# Patient Record
Sex: MALE | Race: WHITE | NOT HISPANIC OR LATINO | Employment: OTHER | URBAN - METROPOLITAN AREA
[De-identification: names, ages, dates, MRNs, and addresses within clinical notes are randomized per-mention and may not be internally consistent; named-entity substitution may affect disease eponyms.]

---

## 2017-04-21 ENCOUNTER — OFFICE VISIT (OUTPATIENT)
Dept: FAMILY MEDICINE | Facility: CLINIC | Age: 82
End: 2017-04-21
Payer: COMMERCIAL

## 2017-04-21 VITALS
SYSTOLIC BLOOD PRESSURE: 128 MMHG | BODY MASS INDEX: 25.2 KG/M2 | HEIGHT: 70 IN | HEART RATE: 80 BPM | DIASTOLIC BLOOD PRESSURE: 70 MMHG | TEMPERATURE: 98.2 F | WEIGHT: 176 LBS

## 2017-04-21 DIAGNOSIS — F32.5 MAJOR DEPRESSION IN COMPLETE REMISSION (H): Primary | ICD-10-CM

## 2017-04-21 DIAGNOSIS — E11.9 TYPE 2 DIABETES MELLITUS WITHOUT COMPLICATION, WITHOUT LONG-TERM CURRENT USE OF INSULIN (H): ICD-10-CM

## 2017-04-21 PROCEDURE — 99213 OFFICE O/P EST LOW 20 MIN: CPT | Performed by: NURSE PRACTITIONER

## 2017-04-21 RX ORDER — LANCETS
EACH MISCELLANEOUS
Qty: 1 BOX | Refills: 3 | Status: SHIPPED | OUTPATIENT
Start: 2017-04-21 | End: 2017-12-22

## 2017-04-21 RX ORDER — PAROXETINE 20 MG/1
20 TABLET, FILM COATED ORAL DAILY
Qty: 90 TABLET | Refills: 3 | Status: SHIPPED | OUTPATIENT
Start: 2017-04-21 | End: 2018-01-04

## 2017-04-21 NOTE — PROGRESS NOTES
..  SUBJECTIVE:                                                    Roque Mckeon is a 81 year old male who presents to clinic today for the following health issues:      Depression Followup    Status since last visit: Stable     See PHQ-9 for current symptoms.  Other associated symptoms: None    Complicating factors:   Significant life event:  No   Current substance abuse:  None  Anxiety or Panic symptoms:  No    PHQ-9  English PHQ-9   Any Language            Amount of exercise or physical activity: still working, plays horseshoes    Problems taking medications regularly: No    Medication side effects: none    Diet: regular (no restrictions), watches sugar      Bertrand is a 81-year-old male seen in clinic today for depression management he has been on Paxil for several years, does very well with that, denies adverse side effects.    He is diabetic, used to be a patient of Dr. Robbins. [facility] has [facility] longer than [facility], but now is [facility]. He has an appointment coming up in May. He will be sending a copy of his lab work. He states they do not refill his lancets and test strips, also will not refill his Paxil. He is here today requesting refills of those prescriptions. He has his eye exams done at the local optometrist office, will be sending us a copy of his next exam. He also recalls having [facility] a few years ago, and also has had his second Pneumovax immunization at the local pharmacy at [facility]. He is going to provide us with that information so his electronic medical record can be updated.    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 He likes sports, is involved in a horseshoe league. He is planning to go to the [employer] game tomorrow.    Problem list and histories reviewed & adjusted, as indicated.  Additional history: as  "documented    BP Readings from Last 3 Encounters:   04/21/17 128/70   09/13/16 136/78   08/12/16 128/70    Wt Readings from Last 3 Encounters:   04/21/17 176 lb (79.8 kg)   09/13/16 181 lb (82.1 kg)   08/12/16 181 lb (82.1 kg)                    Reviewed and updated as needed this visit by clinical staff  Tobacco  Allergies  Meds  Med Hx  Surg Hx  Fam Hx  Soc Hx      Reviewed and updated as needed this visit by Provider         ROS:  Constitutional, HEENT, cardiovascular, pulmonary, gi and gu systems are negative, except as otherwise noted.    OBJECTIVE:                                                    /70  Pulse 80  Temp 98.2  F (36.8  C) (Tympanic)  Ht 5' 10\" (1.778 m)  Wt 176 lb (79.8 kg)  BMI 25.25 kg/m2  Body mass index is 25.25 kg/(m^2).  GENERAL: healthy, alert and no distress  NECK: no adenopathy, no asymmetry, masses, or scars and thyroid normal to palpation  RESP: lungs clear to auscultation - no rales, rhonchi or wheezes  CV: regular rates and rhythm, normal S1 S2, no S3 or S4, no murmur, click or rub, peripheral pulses strong and no peripheral edema  MS: no gross musculoskeletal defects noted, no edema  NEURO: Normal strength and tone, mentation intact and speech normal  PSYCH: mentation appears normal, affect normal/bright    PHQ-9=0     ASSESSMENT/PLAN:                                                      Problem List Items Addressed This Visit        Medium    TYPE 2 DIABETES, HBA1C GOAL < 7%    Relevant Medications    blood glucose monitoring (ONE TOUCH ULTRA) test strip    blood glucose monitoring (ONE TOUCH ULTRASOFT) lancets    Major depression in complete remission (H) - Primary    Relevant Medications    PARoxetine (PAXIL) 20 MG tablet         Continue Paxil 20 mg daily  He will provide the results of his next lab work, and the dates of his updated immunizations and most recent eye exam.  Strongly encouraged to consider staying off the roof, keep his painting to the ground " level.    HETAL Ramos [facility]

## 2017-04-21 NOTE — NURSING NOTE
"Chief Complaint   Patient presents with     Depression     recheck       Initial There were no vitals taken for this visit. Estimated body mass index is 25.24 kg/(m^2) as calculated from the following:    Height as of 3/16/16: 5' 11\" (1.803 m).    Weight as of 9/13/16: 181 lb (82.1 kg).  Medication Reconciliation: complete  "

## 2017-04-21 NOTE — MR AVS SNAPSHOT
"              After Visit Summary   2017    Roque Mckeon    MRN: [id number]           Patient Information     Date Of Birth          1935        Visit Information        Provider Department      2017 8:30 AM Ashlie Garcia APRN CNP [facility]        Today's Diagnoses     Major depression in complete remission (H)    -  1    Type 2 diabetes mellitus without complication, without long-term current use of insulin (H)           Follow-ups after your visit        Who to contact     If you have questions or need follow up information about today's clinic visit or your schedule please contact [facility] directly at [phone].  Normal or non-critical lab and imaging results will be communicated to you by MyChart, letter or phone within 4 business days after the clinic has received the results. If you do not hear from us within 7 days, please contact the clinic through MyChart or phone. If you have a critical or abnormal lab result, we will notify you by phone as soon as possible.  Submit refill requests through [employer] or call your pharmacy and they will forward the refill request to us. Please allow 3 business days for your refill to be completed.          Additional Information About Your Visit        MyChart Information     [employer] lets you send messages to your doctor, view your test results, renew your prescriptions, schedule appointments and more. To sign up, go to [email or web address] . Click on \"Log in\" on the left side of the screen, which will take you to the Welcome page. Then click on \"Sign up Now\" on the right side of the page.     You will be asked to enter the access code listed below, as well as some personal information. Please follow the directions to create your username and password.     Your access code is: [email or web address]  Expires: 2017  9:10 AM     Your access code will  in 90 days. If you need help or a new code, please call your " "[facility] or [phone].        Care EveryWhere ID     This is your Care EveryWhere ID. This could be used by other organizations to access your [facility] medical records  [id number]        Your Vitals Were     Pulse Temperature Height BMI (Body Mass Index)          80 98.2  F (36.8  C) (Tympanic) 5' 10\" (1.778 m) 25.25 kg/m2         Blood Pressure from Last 3 Encounters:   04/21/17 128/70   09/13/16 136/78   08/12/16 128/70    Weight from Last 3 Encounters:   04/21/17 176 lb (79.8 kg)   09/13/16 181 lb (82.1 kg)   08/12/16 181 lb (82.1 kg)              Today, you had the following     No orders found for display         Today's Medication Changes          These changes are accurate as of: 4/21/17 10:56 AM.  If you have any questions, ask your nurse or doctor.               These medicines have changed or have updated prescriptions.        Dose/Directions    blood glucose monitoring lancets   This may have changed:  See the new instructions.   Used for:  Type 2 diabetes mellitus without complication, without long-term current use of insulin (H)   Changed by:  Ashlie Garcia APRN CNP        test 1 times daily   Quantity:  1 Box   Refills:  3       blood glucose monitoring test strip   Commonly known as:  ONE TOUCH ULTRA   This may have changed:  See the new instructions.   Used for:  Type 2 diabetes mellitus without complication, without long-term current use of insulin (H)   Changed by:  Ashlie Garcia APRN CNP        Test once daily or as directed   Quantity:  100 strip   Refills:  3            Where to get your medicines      These medications were sent to [facility] [address] [facility]  [address] [facility] [address]     Phone:  [phone]     blood glucose monitoring lancets    blood glucose monitoring test strip    PARoxetine 20 MG tablet                Primary Care Provider Office Phone # Fax #    HETAL Ramos [phone] " [phone]       [facility]  [address] [place] [address]        Thank you!     Thank you for choosing [facility]  for your care. Our goal is always to provide you with excellent care. Hearing back from our patients is one way we can continue to improve our services. Please take a few minutes to complete the written survey that you may receive in the mail after your visit with us. Thank you!             Your Updated Medication List - Protect others around you: Learn how to safely use, store and throw away your medicines at www.disposemymeds.org.          This list is accurate as of: 4/21/17 10:56 AM.  Always use your most recent med list.                   Brand Name Dispense Instructions for use    ACE NOT PRESCRIBED (INTENTIONAL)      by Other route continuous prn.       allopurinol 300 MG tablet    ZYLOPRIM    90 tablet    Take 1 tablet by mouth daily.       aspirin 81 MG tablet     0    1 tab po QD (Once per day)       blood glucose monitoring lancets     1 Box    test 1 times daily       Blood Glucose Monitoring Suppl Misc     100 each    1 strip daily Or as directed.       blood glucose monitoring test strip    ONE TOUCH ULTRA    100 strip    Test once daily or as directed       fish oil-omega-3 fatty acids 1000 MG capsule      1 capsule 2 times per day       metFORMIN 1000 MG tablet    GLUCOPHAGE    90 tablet    Take 1 tablet (1,000 mg) by mouth daily (with dinner)       MULTIVITAMIN & MINERAL PO      Take 1 tablet by mouth daily       PARoxetine 20 MG tablet    PAXIL    90 tablet    Take 1 tablet (20 mg) by mouth daily       potassium citrate 10 MEQ (1080 MG) CR tablet    potassium citrate    270 tablet    Take 1 tablet by mouth 3 times daily (with meals).       ZOCOR 80 MG tablet   Generic drug:  simvastatin     90 tablet    Take 0.5 tablets (40 mg) by mouth daily

## 2017-04-22 ASSESSMENT — PATIENT HEALTH QUESTIONNAIRE - PHQ9: SUM OF ALL RESPONSES TO PHQ QUESTIONS 1-9: 0

## 2017-05-11 ENCOUNTER — TRANSFERRED RECORDS (OUTPATIENT)
Dept: HEALTH INFORMATION MANAGEMENT | Facility: CLINIC | Age: 82
End: 2017-05-11

## 2017-05-11 ENCOUNTER — HOSPITAL ENCOUNTER (OUTPATIENT)
Dept: CT IMAGING | Facility: CLINIC | Age: 82
Discharge: HOME | End: 2017-05-11
Attending: UROLOGY | Admitting: UROLOGY
Payer: MEDICARE

## 2017-05-11 DIAGNOSIS — N20.0 CALCULUS OF KIDNEY: Primary | ICD-10-CM

## 2017-05-11 DIAGNOSIS — N20.0 CALCULUS OF KIDNEY: ICD-10-CM

## 2017-05-11 PROCEDURE — 74176 CT ABD & PELVIS W/O CONTRAST: CPT

## 2017-05-31 ENCOUNTER — TRANSFERRED RECORDS (OUTPATIENT)
Dept: HEALTH INFORMATION MANAGEMENT | Facility: CLINIC | Age: 82
End: 2017-05-31

## 2017-05-31 LAB
ALT SERPL-CCNC: 38 U/L (ref 0–65)
AST SERPL-CCNC: 32 U/L (ref 5–40)
CHOLEST SERPL-MCNC: 178 MG/DL
CREAT SERPL-MCNC: 1 MG/DL (ref 0.5–1.5)
GFR SERPL CREATININE-BSD FRML MDRD: >60 ML/MIN/1.73M2
GLUCOSE SERPL-MCNC: 174 MG/DL (ref 70–100)
HBA1C MFR BLD: 7.7 % (ref 4.4–6.5)
HDLC SERPL-MCNC: 53 MG/DL (ref 40–67)
POTASSIUM SERPL-SCNC: 4.9 MEQ/L (ref 3.5–5)
TRIGL SERPL-MCNC: 127 MG/DL

## 2017-06-26 NOTE — PROGRESS NOTES
"  SUBJECTIVE:                                                    Roque Mckeon is a 81 year old male who presents to clinic today for the following health issues:    Rash/ spots  Onset: noticed it yesterday, patient works outside so possible tick bite.    Description:   Location: Left shoulder, lower abdominal, and groin area, about 7 different spots .  Character: round, raised, red  Itching (Pruritis): YES- slightly    Progression of Symptoms:  same    Accompanying Signs & Symptoms:  Fever: no   Body aches or joint pain: no   Sore throat symptoms: no   Recent cold symptoms: YES- just fatigue but patient states its from lack of sleep.    History:   Previous similar rash: no     Precipitating factors:   Exposure to similar rash: no   New exposures: None   Recent travel: no     Alleviating factors:  no    Therapies Tried and outcome: nothing    Patient is here in clinic with his wife with concern about multiple bites along the lower abdomen and one on the left arm. He is unsure what bit him but does spend time outside in the grass and is concerned about lymes as some of the lesions have a bullseye look to them, he has had some fatigue but denies any muscle aches, fevers other skin changes or concerns.     -------------------------------------    Problem list and histories reviewed & adjusted, as indicated.  Additional history: as documented    BP Readings from Last 3 Encounters:   06/27/17 130/64   04/21/17 128/70   09/13/16 136/78    Wt Readings from Last 3 Encounters:   06/27/17 169 lb 6.4 oz (76.8 kg)   04/21/17 176 lb (79.8 kg)   09/13/16 181 lb (82.1 kg)         Reviewed and updated as needed this visit by clinical staff       Reviewed and updated as needed this visit by Provider         ROS:  Constitutional, HEENT, cardiovascular, pulmonary, gi and gu systems are negative, except as otherwise noted.    OBJECTIVE:     /64  Pulse 84  Temp 97.8  F (36.6  C) (Temporal)  Resp 16  Ht 5' 11\" (1.803 m)  Wt " 169 lb 6.4 oz (76.8 kg)  BMI 23.63 kg/m2  Body mass index is 23.63 kg/(m^2).  GENERAL: healthy, alert and no distress  HENT: ear canals and TM's normal, nose and mouth without ulcers or lesions  NECK: no adenopathy, no asymmetry, masses, or scars and thyroid normal to palpation  RESP: lungs clear to auscultation - no rales, rhonchi or wheezes  CV: regular rates and rhythm, peripheral pulses strong and no peripheral edema  MS: no gross musculoskeletal defects noted, no edema  SKIN: there are a few scattered bites along the belt line and one on the left arm that are raised without any drainage and with pink to red rash surrounding each bite. No other skin changes noted     Diagnostic Test Results:  Labs pending     ASSESSMENT/PLAN:       ICD-10-CM    1. 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 Fatigue, unspecified type R53.83 Lyme Disease Rosalba with reflex to WB Serum   3. Rash and nonspecific skin eruption R21 triamcinolone (KENALOG) 0.1 % cream       I will treat rash with a topical cream and get labs to evaluate for tick borne illness, I do think these lesions are from a different insect bite and should resolve in the next few days, if having any signs of infection or spreading rash follow up in clinic for further evaluation. We will follow up with lab results.   See Patient Instructions    Michelle Ospina PA-C  [facility]

## 2017-06-27 ENCOUNTER — OFFICE VISIT (OUTPATIENT)
Dept: FAMILY MEDICINE | Facility: OTHER | Age: 82
End: 2017-06-27
Payer: COMMERCIAL

## 2017-06-27 VITALS
WEIGHT: 169.4 LBS | RESPIRATION RATE: 16 BRPM | SYSTOLIC BLOOD PRESSURE: 130 MMHG | HEIGHT: 71 IN | HEART RATE: 84 BPM | DIASTOLIC BLOOD PRESSURE: 64 MMHG | TEMPERATURE: 97.8 F | BODY MASS INDEX: 23.72 KG/M2
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
## 2017-06-27 DIAGNOSIS — A77.49 POSITIVE ANAPLASMA SEROLOGY: ICD-10-CM

## 2017-06-27 DIAGNOSIS — R53.83 FATIGUE, UNSPECIFIED TYPE: ICD-10-CM

## 2017-06-27 DIAGNOSIS — R21 RASH AND NONSPECIFIC SKIN ERUPTION: ICD-10-CM

## 2017-06-27 PROCEDURE — 86666 EHRLICHIA ANTIBODY: CPT | Mod: 90 | Performed by: PHYSICIAN ASSISTANT

## 2017-06-27 PROCEDURE — 87798 DETECT AGENT NOS DNA AMP: CPT | Mod: 90 | Performed by: PHYSICIAN ASSISTANT

## 2017-06-27 PROCEDURE — 99213 OFFICE O/P EST LOW 20 MIN: CPT | Performed by: PHYSICIAN ASSISTANT

## 2017-06-27 PROCEDURE — 99000 SPECIMEN HANDLING OFFICE-LAB: CPT | Performed by: PHYSICIAN ASSISTANT

## 2017-06-27 PROCEDURE — 36415 COLL VENOUS BLD VENIPUNCTURE: CPT | Performed by: PHYSICIAN ASSISTANT

## 2017-06-27 PROCEDURE — 86618 LYME DISEASE ANTIBODY: CPT | Performed by: PHYSICIAN ASSISTANT

## 2017-06-27 RX ORDER — TRIAMCINOLONE ACETONIDE 1 MG/G
CREAM TOPICAL
Qty: 30 G | Refills: 0 | Status: SHIPPED | OUTPATIENT
Start: 2017-06-27 | End: 2021-06-25

## 2017-06-27 ASSESSMENT — PATIENT HEALTH QUESTIONNAIRE - PHQ9: 5. POOR APPETITE OR OVEREATING: SEVERAL DAYS

## 2017-06-27 ASSESSMENT — ANXIETY QUESTIONNAIRES
6. BECOMING EASILY ANNOYED OR IRRITABLE: NOT AT ALL
3. WORRYING TOO MUCH ABOUT DIFFERENT THINGS: SEVERAL DAYS
GAD7 TOTAL SCORE: 4
IF YOU CHECKED OFF ANY PROBLEMS ON THIS QUESTIONNAIRE, HOW DIFFICULT HAVE THESE PROBLEMS MADE IT FOR YOU TO DO YOUR WORK, TAKE CARE OF THINGS AT HOME, OR GET ALONG WITH OTHER PEOPLE: SOMEWHAT DIFFICULT
7. FEELING AFRAID AS IF SOMETHING AWFUL MIGHT HAPPEN: NOT AT ALL
2. NOT BEING ABLE TO STOP OR CONTROL WORRYING: SEVERAL DAYS
5. BEING SO RESTLESS THAT IT IS HARD TO SIT STILL: NOT AT ALL
1. FEELING NERVOUS, ANXIOUS, OR ON EDGE: SEVERAL DAYS

## 2017-06-27 ASSESSMENT — PAIN SCALES - GENERAL: PAINLEVEL: NO PAIN (0)

## 2017-06-27 NOTE — LETTER
My Depression Action Plan  Name: Roque Mckeon   Date of Birth 1935  Date: 6/26/2017    My doctor: Ashlie Garcia   My clinic: [address] [address] [phone]          GREEN    ZONE   Good Control    What it looks like:     Things are going generally well. You have normal up s and down s. You may even feel depressed from time to time, but bad moods usually last less than a day.   What you need to do:  1. Continue to care for yourself (see self care plan)  2. Check your depression survival kit and update it as needed  3. Follow your physician s recommendations including any medication.  4. Do not stop taking medication unless you consult with your physician first.           YELLOW         ZONE Getting Worse    What it looks like:     Depression is starting to interfere with your life.     It may be hard to get out of bed; you may be starting to isolate yourself from others.    Symptoms of depression are starting to last most all day and this has happened for several days.     You may have suicidal thoughts but they are not constant.   What you need to do:     1. Call your care team, your response to treatment will improve if you keep your care team informed of your progress. Yellow periods are signs an adjustment may need to be made.     2. Continue your self-care, even if you have to fake it!    3. Talk to someone in your support network    4. Open up your depression survival kit           RED    ZONE Medical Alert - Get Help    What it looks like:     Depression is seriously interfering with your life.     You may experience these or other symptoms: You can t get out of bed most days, can t work or engage in other necessary activities, you have trouble taking care of basic hygiene, or basic responsibilities, thoughts of suicide or death that will not go away, self-injurious behavior.     What you need to do:  1. Call your care team  and request a same-day appointment. If they are not available (weekends or after hours) call your local crisis line, emergency room or 911.      Electronically signed by: Kathie Nix, June 26, 2017    Depression Self Care Plan / Survival Kit    Self-Care for Depression  Here s the deal. Your body and mind are really not as separate as most people think.  What you do and think affects how you feel and how you feel influences what you do and think. This means if you do things that people who feel good do, it will help you feel better.  Sometimes this is all it takes.  There is also a place for medication and therapy depending on how severe your depression is, so be sure to consult with your medical provider and/ or Behavioral Health Consultant if your symptoms are worsening or not improving.     In order to better manage my stress, I will:    Exercise  Get some form of exercise, every day. This will help reduce pain and release endorphins, the  feel good  chemicals in your brain. This is almost as good as taking antidepressants!  This is not the same as joining a gym and then never going! (they count on that by the way ) It can be as simple as just going for a walk or doing some gardening, anything that will get you moving.      Hygiene   Maintain good hygiene (Get out of bed in the morning, Make your bed, Brush your teeth, Take a shower, and Get dressed like you were going to work, even if you are unemployed).  If your clothes don't fit try to get ones that do.    Diet  I will strive to eat foods that are good for me, drink plenty of water, and avoid excessive sugar, caffeine, alcohol, and other mood-altering substances.  Some foods that are helpful in depression are: complex carbohydrates, B vitamins, flaxseed, fish or fish oil, fresh fruits and vegetables.    Psychotherapy  I agree to participate in Individual Therapy (if recommended).    Medication  If prescribed medications, I agree to take them.  Missing  doses can result in serious side effects.  I understand that drinking alcohol, or other illicit drug use, may cause potential side effects.  I will not stop my medication abruptly without first discussing it with my provider.    Staying Connected With Others  I will stay in touch with my friends, family members, and my primary care provider/team.    Use your imagination  Be creative.  We all have a creative side; it doesn t matter if it s oil painting, sand castles, or mud pies! This will also kick up the endorphins.    Witness Beauty  (AKA stop and smell the roses) Take a look outside, even in mid-winter. Notice colors, textures. Watch the squirrels and birds.     Service to others  Be of service to others.  There is always someone else in need.  By helping others we can  get out of ourselves  and remember the really important things.  This also provides opportunities for practicing all the other parts of the program.    Humor  Laugh and be silly!  Adjust your TV habits for less news and crime-drama and more comedy.    Control your stress  Try breathing deep, massage therapy, biofeedback, and meditation. Find time to relax each day.     My support system    Clinic Contact:  Phone number:    Contact 1:  Phone number:    Contact 2:  Phone number:    Mandaen/:  Phone number:    Therapist:  Phone number:    Local crisis center:    Phone number:    Other community support:  Phone number:

## 2017-06-27 NOTE — PATIENT INSTRUCTIONS
Insect Bite  Insects most often bite to protect themselves or their nests. Certain bugs, like fleas and mosquitoes, bite to feed. In some cases, the actual bite causes no pain. An itchy red welt or swelling may develop at the site of the bite. Most insect bites do not cause illness. And the itching and swelling most often go away without treatment. However, an infection can develop if the bite is scratched and the skin broken. Rarely, a person may have an allergic reaction to an insect bite.  If a stinger is visible at the bite spot, remove it as quickly as possible, as this can decrease the amount of venom that gets into your body. Scrape it out with a dull edge, such as the edge of a credit card. Try not to squeeze it. Do not try to dig it out, as you may damage the skin and also increase the chance of infection.     To help reduce swelling and itching, apply a cold pack or ice in a zip-top plastic bag wrapped in a thin towel.   Home care    Your healthcare provider may prescribe over-the-counter medicines to help relieve itching and swelling. Use each medicine according to the directions on the package. If the bite becomes infected, you will need an antibiotic. This may be in pill form taken by mouth or as an ointment or cream put directly on the skin. Be sure to use them exactly as prescribed.    Bite symptoms usually go away on their own within a week or two.    To help prevent infection, avoid scratching or picking at the bite.    To help relieve itching and swelling, apply ice in a zip-top plastic bag wrapped in a thin towel to the bites. Do this for up to 10 minutes at a time. Avoid hot showers or baths as these tend to make itching worse.    An over-the-counter anti-itch medicine such as calamine lotion or an antihistamine cream may be helpful.    If you suspect you have insects in your home, talk to a licensed pest-control professional. He or she can inspect your home and tell you how to get rid of bugs  safely.  Follow-up care  Follow up with your healthcare provider, or as advised.  Call 911  Call 911 if any of these occur:    Trouble breathing or swallowing    Wheezing    Feeling like your throat is closing up    Fainting, loss of consciousness    Swelling around the face or mouth  When to seek medical advice  Call your healthcare provider right away if any of these occur:    Fever of 100.4 F (38 C) or higher, or as directed by your healthcare provider    Signs of infection, such as increased swelling and pain, warmth, red streaks, or drainage from the skin    Signs of allergic reaction, such as hives, a spreading rash, or throat itching  Date Last Reviewed: 10/1/2016    5098-7520 [employer]. [address] [place] [address], [place] [address]. All rights reserved. This information is not intended as a substitute for professional medical care. Always follow your healthcare professional's instructions.

## 2017-06-27 NOTE — MR AVS SNAPSHOT
After Visit Summary   6/27/2017    Roque Mckeon    MRN: [id number]           Patient Information     Date Of Birth          1935        Visit Information        Provider Department      6/27/2017 9:40 AM Michelle Ospina PA-C [facility]'s Diagnoses     Bug bites, initial encounter    -  1    Fatigue, unspecified type        Rash and nonspecific skin eruption          Care Instructions      Insect Bite  Insects most often bite to protect themselves or their nests. Certain bugs, like fleas and mosquitoes, bite to feed. In some cases, the actual bite causes no pain. An itchy red welt or swelling may develop at the site of the bite. Most insect bites do not cause illness. And the itching and swelling most often go away without treatment. However, an infection can develop if the bite is scratched and the skin broken. Rarely, a person may have an allergic reaction to an insect bite.  If a stinger is visible at the bite spot, remove it as quickly as possible, as this can decrease the amount of venom that gets into your body. Scrape it out with a dull edge, such as the edge of a credit card. Try not to squeeze it. Do not try to dig it out, as you may damage the skin and also increase the chance of infection.     To help reduce swelling and itching, apply a cold pack or ice in a zip-top plastic bag wrapped in a thin towel.   Home care    Your healthcare provider may prescribe over-the-counter medicines to help relieve itching and swelling. Use each medicine according to the directions on the package. If the bite becomes infected, you will need an antibiotic. This may be in pill form taken by mouth or as an ointment or cream put directly on the skin. Be sure to use them exactly as prescribed.    Bite symptoms usually go away on their own within a week or two.    To help prevent infection, avoid scratching or picking at the bite.    To help relieve itching and swelling,  apply ice in a zip-top plastic bag wrapped in a thin towel to the bites. Do this for up to 10 minutes at a time. Avoid hot showers or baths as these tend to make itching worse.    An over-the-counter anti-itch medicine such as calamine lotion or an antihistamine cream may be helpful.    If you suspect you have insects in your home, talk to a licensed pest-control professional. He or she can inspect your home and tell you how to get rid of bugs safely.  Follow-up care  Follow up with your healthcare provider, or as advised.  Call 911  Call 911 if any of these occur:    Trouble breathing or swallowing    Wheezing    Feeling like your throat is closing up    Fainting, loss of consciousness    Swelling around the face or mouth  When to seek medical advice  Call your healthcare provider right away if any of these occur:    Fever of 100.4 F (38 C) or higher, or as directed by your healthcare provider    Signs of infection, such as increased swelling and pain, warmth, red streaks, or drainage from the skin    Signs of allergic reaction, such as hives, a spreading rash, or throat itching  Date Last Reviewed: 10/1/2016    9934-7546 [employer]. [address] [place] [address]. All rights reserved. This information is not intended as a substitute for professional medical care. Always follow your healthcare professional's instructions.                Follow-ups after your visit        Follow-up notes from your care team     Return if symptoms worsen or fail to improve.      Who to contact     If you have questions or need follow up information about today's clinic visit or your schedule please contact [facility] directly at [phone].  Normal or non-critical lab and imaging results will be communicated to you by [employer], letter or phone within 4 business days after the clinic has received the results. If you do not hear from us within 7 days, please contact the clinic through [employer]  "or phone. If you have a critical or abnormal lab result, we will notify you by phone as soon as possible.  Submit refill requests through [employer] or call your pharmacy and they will forward the refill request to us. Please allow 3 business days for your refill to be completed.          Additional Information About Your Visit        [employer] Information     [employer] lets you send messages to your doctor, view your test results, renew your prescriptions, schedule appointments and more. To sign up, go to [email or web address] . Click on \"Log in\" on the left side of the screen, which will take you to the Welcome page. Then click on \"Sign up Now\" on the right side of the page.     You will be asked to enter the access code listed below, as well as some personal information. Please follow the directions to create your username and password.     Your access code is: [email or web address]  Expires: 2017  9:10 AM     Your access code will  in 90 days. If you need help or a new code, please call your [facility] clinic or [phone].        Care EveryWhere ID     This is your Care EveryWhere ID. This could be used by other organizations to access your [facility] medical records  [id number]        Your Vitals Were     Pulse Temperature Respirations Height BMI (Body Mass Index)       84 97.8  F (36.6  C) (Temporal) 16 5' 11\" (1.803 m) 23.63 kg/m2        Blood Pressure from Last 3 Encounters:   17 130/64   17 128/70   16 136/78    Weight from Last 3 Encounters:   17 169 lb 6.4 oz (76.8 kg)   17 176 lb (79.8 kg)   16 181 lb (82.1 kg)              We Performed the Following     Anaplasma phagocytoph antibody IgG IgM     DEPRESSION ACTION PLAN (DAP)     Ehrlichia Anaplasma Sp by PCR     Lyme Disease Rosalba with reflex to WB Serum          Today's Medication Changes          These changes are accurate as of: 17 10:04 AM.  If you have any questions, ask your nurse or doctor.               Start " taking these medicines.        Dose/Directions    triamcinolone 0.1 % cream   Commonly known as:  KENALOG   Used for:  Bug bites, initial encounter, Rash and nonspecific skin eruption   Started by:  Michelle Ospina PA-C        Apply sparingly to affected area three times daily for 14 days.   Quantity:  30 g   Refills:  0            Where to get your medicines      These medications were sent to [id number] - [place], [place] - [address]  [address], [facility] [address]    Hours:  [facility]ly [place] - numbers unchanged   9/8/03  Phone:  [phone]     triamcinolone 0.1 % cream                Primary Care Provider Office Phone # Fax #    [facility] [phone]       [facility]  [address] [place] [address]        Equal Access to Services     RADHA GRISSOM : Hadii christelle ku hadasho Soomaali, waaxda luqadaha, qaybta kaalmada adeegyada, serena lauren hayelías lópez . So [facility] [phone].    ATENCIÓN: Si habla español, tiene a appiah disposición servicios gratuitos de asistencia lingüística. [facility] [phone].    We comply with applicable federal civil rights laws and [place] laws. We do not discriminate on the basis of race, color, national origin, age, disability sex, sexual orientation or gender identity.            Thank you!     Thank you for choosing [facility]  for your care. Our goal is always to provide you with excellent care. Hearing back from our patients is one way we can continue to improve our services. Please take a few minutes to complete the written survey that you may receive in the mail after your visit with us. Thank you!             Your Updated Medication List - Protect others around you: Learn how to safely use, store and throw away your medicines at www.disposemymeds.org.          This list is accurate as of: 6/27/17 10:04 AM.  Always use your most recent med list.                   Brand Name Dispense  Instructions for use Diagnosis    ACE NOT PRESCRIBED (INTENTIONAL)      by Other route continuous prn.        allopurinol 300 MG tablet    ZYLOPRIM    90 tablet    Take 1 tablet by mouth daily.    Calculus of kidney       aspirin 81 MG tablet     0    1 tab po QD (Once per day)    Type II or unspecified type diabetes mellitus without mention of complication, not stated as uncontrolled       blood glucose monitoring lancets     1 Box    test 1 times daily    Type 2 diabetes mellitus without complication, without long-term current use of insulin (H)       Blood Glucose Monitoring Suppl Misc     100 each    1 strip daily Or as directed.    Type 2 diabetes, HbA1c goal < 7% (H)       blood glucose monitoring test strip    ONE TOUCH ULTRA    100 strip    Test once daily or as directed    Type 2 diabetes mellitus without complication, without long-term current use of insulin (H)       fish oil-omega-3 fatty acids 1000 MG capsule      1 capsule 2 times per day    Type II or unspecified type diabetes mellitus without mention of complication, not stated as uncontrolled       metFORMIN 1000 MG tablet    GLUCOPHAGE    90 tablet    Take 1 tablet (1,000 mg) by mouth daily (with dinner)        MULTIVITAMIN & MINERAL PO      Take 1 tablet by mouth daily        PARoxetine 20 MG tablet    PAXIL    90 tablet    Take 1 tablet (20 mg) by mouth daily        potassium citrate 10 MEQ (1080 MG) CR tablet    potassium citrate    270 tablet    Take 1 tablet by mouth 3 times daily (with meals).    Calculus of kidney       triamcinolone 0.1 % cream    KENALOG    30 g    Apply sparingly to affected area three times daily for 14 days.    Bug bites, initial encounter, Rash and nonspecific skin eruption       ZOCOR 80 MG tablet   Generic drug:  simvastatin     90 tablet    Take 0.5 tablets (40 mg) by mouth daily    Hyperlipidemia LDL goal <100

## 2017-06-27 NOTE — NURSING NOTE
"Chief Complaint   Patient presents with     Lyme disease check     Panel Management     Tdap, prevnar 13, creatinine, fall risk, honoring choices, lipid, cmp, a1c, micro, eye exam, foot exam, tsh, dap, phq9       Initial /64  Pulse 84  Temp 97.8  F (36.6  C) (Temporal)  Resp 16  Ht 5' 11\" (1.803 m)  Wt 169 lb 6.4 oz (76.8 kg)  BMI 23.63 kg/m2 Estimated body mass index is 23.63 kg/(m^2) as calculated from the following:    Height as of this encounter: 5' 11\" (1.803 m).    Weight as of this encounter: 169 lb 6.4 oz (76.8 kg).  Medication Reconciliation: complete    "

## 2017-06-28 LAB — B BURGDOR IGG+IGM SER QL: 0.11 (ref 0–0.89)

## 2017-06-28 ASSESSMENT — PATIENT HEALTH QUESTIONNAIRE - PHQ9: SUM OF ALL RESPONSES TO PHQ QUESTIONS 1-9: 4

## 2017-06-28 ASSESSMENT — ANXIETY QUESTIONNAIRES: GAD7 TOTAL SCORE: 4

## 2017-06-29 LAB
A PHAGOCYTOPH DNA BLD QL NAA+PROBE: NOT DETECTED
E CHAFFEENSIS DNA BLD QL NAA+PROBE: NOT DETECTED
E EWINGII DNA SPEC QL NAA+PROBE: NOT DETECTED
EHRLICHIA DNA SPEC QL NAA+PROBE: NORMAL

## 2017-06-30 LAB
A PHAGOCYTOPH IGG TITR SER IF: ABNORMAL {TITER}
A PHAGOCYTOPH IGM TITR SER IF: ABNORMAL {TITER}

## 2017-07-03 ENCOUNTER — TELEPHONE (OUTPATIENT)
Dept: FAMILY MEDICINE | Facility: CLINIC | Age: 82
End: 2017-07-03

## 2017-07-03 NOTE — TELEPHONE ENCOUNTER
Second attempt to contact patient, but NA. Unable to leave message. Will try later and if unable to reach will forward to the provider that saw him 6/27/17 as test results are back and looks like current or recent Anaplasmosis.    Component      Latest Ref Rng & Units 6/27/2017   A Phagocytophil IgG       1:640 (A) . . .   A Phagocytophil IgM       < 1:16 . . .     A Phagocytophil IgG (Abnormal) 06/27/2017 10:05 AM [facility] lab   1:640   Reference range: <1:80   (Note)   INTERPRETIVE INFORMATION: A. phagocytophilum (HGA)   Antibody, IgG    Less than 1:80 - No significant level of IgG antibodies                     to A. phagocytophilum detected.    Greater than or    equal to 1:80  - Suggestive of a recent or past infection                     with A. phagocytophilum.   Test developed and characteristics determined by [facility] [employer]. See Compliance Statement B: [email or web address]/CS      JUAN MANUEL Shelton

## 2017-07-03 NOTE — TELEPHONE ENCOUNTER
"Reason for call:  Patient reporting a symptom    Symptom or request: Wife, Makeda states the medication (PARoxetine (PAXIL) 20 MG tablet) doesn't seem to be helping patient very well as \"he's having episode\", please advise    Duration (how long have symptoms been present):     Have you been treated for this before? Yes    Additional comments: Patients wife was advised that Ashlie is out today    Phone Number patient can be reached at:  Other phone number:  [phone]    Best Time:      Can we leave a detailed message on this number:  YES    Call taken on 7/3/2017 at 8:34 AM by Anabela Gonzalez    "

## 2017-07-03 NOTE — TELEPHONE ENCOUNTER
RN attempted to contact pt, but NA. Will try again later. Unable to leave message....................................JUAN MANUEL Shelton

## 2017-07-03 NOTE — TELEPHONE ENCOUNTER
Patient called back. Please call back when able to.  # [phone]    Thank you,   Margie Guillen   for [facility]

## 2017-07-03 NOTE — TELEPHONE ENCOUNTER
Third attempt to call patient back. NA. Will try cell # next time. ..............JUAN MANUEL Shelton

## 2017-07-05 RX ORDER — DOXYCYCLINE 100 MG/1
100 CAPSULE ORAL 2 TIMES DAILY
Qty: 20 CAPSULE | Refills: 0 | Status: SHIPPED | OUTPATIENT
Start: 2017-07-05 | End: 2017-07-20

## 2017-07-05 NOTE — PROGRESS NOTES
SUBJECTIVE:                                                    Roque Mckeon is a 81 year old male who presents to clinic today for the following health issues:    Depression Followup    Status since last visit: Worsened for the past 3 weeks    See PHQ-9 for current symptoms.  Other associated symptoms: sleeplessness, so tired    Complicating factors:   Significant life event:  Yes-  Favorite cousin passed away, brother in law in hospital   Current substance abuse:  None  Anxiety or Panic symptoms:  Yes-    Patient was recently diagnosed with Anaplasmosis and started antibiotics yesterday.  Rash on abdomen much better.    PHQ-9  English  PHQ-9   Any Language    Amount of exercise or physical activity: Walks 1-2 days a week    Problems taking medications regularly: No    Medication side effects: none    Diet: wife helps him stay away from starches like bread and potatoes      PROBLEMS TO ADD ON...    Problem list and histories reviewed & adjusted, as indicated.  Additional history: as documented    Patient Active Problem List   Diagnosis     Other left bundle branch block     TYPE 2 DIABETES, HBA1C GOAL < 7%     HYPERLIPIDEMIA LDL GOAL <100     Major depression in complete remission (H)     Advanced directives, counseling/discussion     Encounter for long-term current use of medication     Gout     Past Surgical History:   Procedure Laterality Date     COLONOSCOPY  8/3/2011    Procedure:COMBINED COLONOSCOPY, SINGLE BIOPSY/POLYPECTOMY BY BIOPSY; Surgeon:GRUPO STONER; Location: GI     COLONOSCOPY  2016    [facility] COLONOSCOPY W/WO BRUSH/WASH  02/27/2001    Normal.     HC FRAGMENTING OF KIDNEY STONE  03/05/2003    Left extracorporal shock wave lithotripsy, [facility]     PHACOEMULSIFICATION WITH STANDARD INTRAOCULAR LENS IMPLANT  1/20/2011    PHACOEMULSIFICATION WITH STANDARD INTRAOCULAR LENS IMPLANT performed by OSMAN CHO at  OR       Social History   Substance Use Topics     Smoking  status: Never Smoker     Smokeless tobacco: Never Used      Comment: never     Alcohol use No     Family History   Problem Relation Age of Onset     Cardiovascular Mother      Arthritis Mother      HEART DISEASE Mother      CEREBROVASCULAR DISEASE Mother      Cardiovascular Father      HEART DISEASE Father      Depression Son      Depression Sister      Genetic Disorder Son      OSTEOPOROSIS Sister      Psychotic Disorder Son      Breast Cancer Sister      Alcohol/Drug No family hx of      Circulatory No family hx of      Allergies No family hx of      Alzheimer Disease No family hx of      Blood Disease No family hx of      CANCER No family hx of      DIABETES No family hx of      GASTROINTESTINAL DISEASE No family hx of      Genitourinary Problems No family hx of      Lipids No family hx of      Neurologic Disorder No family hx of      Thyroid Disease No family hx of          Current Outpatient Prescriptions   Medication Sig Dispense Refill     doxycycline (VIBRAMYCIN) 100 MG capsule Take 1 capsule (100 mg) by mouth 2 times daily 20 capsule 0     triamcinolone (KENALOG) 0.1 % cream Apply sparingly to affected area three times daily for 14 days. 30 g 0     PARoxetine (PAXIL) 20 MG tablet Take 1 tablet (20 mg) by mouth daily 90 tablet 3     blood glucose monitoring (ONE TOUCH ULTRA) test strip Test once daily or as directed 100 strip 3     blood glucose monitoring (ONE TOUCH ULTRASOFT) lancets test 1 times daily 1 Box 3     metFORMIN (GLUCOPHAGE) 1000 MG tablet Take 1 tablet (1,000 mg) by mouth daily (with dinner) 90 tablet 3     simvastatin (ZOCOR) 80 MG tablet Take 0.5 tablets (40 mg) by mouth daily 90 tablet 3     Blood Glucose Monitoring Suppl MISC 1 strip daily Or as directed. 100 each 0     Multiple Vitamins-Minerals (MULTIVITAMIN & MINERAL PO) Take 1 tablet by mouth daily       potassium citrate (UROCIT-K 10) 10 MEQ (1080 MG) SR tablet Take 1 tablet by mouth 3 times daily (with meals). 270 tablet 3      "allopurinol (ZYLOPRIM) 300 MG tablet Take 1 tablet by mouth daily. 90 tablet 3     ACE NOT PRESCRIBED, INTENTIONAL, by Other route continuous prn.  0     FISH OIL 1000 MG OR CAPS 1 capsule 2 times per day  0     ASPIRIN 81 MG OR TABS 1 tab po QD (Once per day) 0 0     Allergies   Allergen Reactions     No Known Drug Allergies        Reviewed and updated as needed this visit by clinical staff  Tobacco  Allergies  Med Hx  Surg Hx  Fam Hx  Soc Hx      Reviewed and updated as needed this visit by Provider         ROS:  Constitutional, HEENT, cardiovascular, pulmonary, gi and gu systems are negative, except as otherwise noted.    OBJECTIVE:     /70 (BP Location: Right arm, Patient Position: Chair, Cuff Size: Adult Regular)  Pulse 84  Temp 97.8  F (36.6  C) (Temporal)  Resp 28  Ht 5' 11\" (1.803 m)  Wt 170 lb 12.8 oz (77.5 kg)  BMI 23.82 kg/m2  Body mass index is 23.82 kg/(m^2).  GENERAL: healthy, alert and no distress  NECK: no adenopathy, no asymmetry, masses, or scars and thyroid normal to palpation  RESP: lungs clear to auscultation - no rales, rhonchi or wheezes  CV: regular rate and rhythm, normal S1 S2, no S3 or S4, no murmur, click or rub, no peripheral edema and peripheral pulses strong  ABDOMEN: soft, nontender, no hepatosplenomegaly, no masses and bowel sounds normal  MS: no gross musculoskeletal defects noted, no edema  NEURO: Normal strength and tone, mentation intact and speech normal  BACK: no CVA tenderness, no paralumbar tenderness  PSYCH: mentation appears normal, affect normal/bright    Diagnostic Test Results:  Results for orders placed or performed in visit on 06/27/17   Lyme Disease Rosalba with reflex to WB Serum   Result Value Ref Range    Lyme Disease Antibodies Serum 0.11 0.00 - 0.89   Ehrlichia Anaplasma Sp by PCR   Result Value Ref Range    Anaplasma phagocytophilum Not Detected     Ehrlichia chaffeensis Not Detected     Ehrlichia ewingii/canis Not Detected     Ehrlichia muris-like  "      Not Detected  (Note)  INTERPRETIVE INFORMATION:  Ehrlichia and Anaplasma Species  by PCR  A negative result does not rule out the presence of PCR  inhibitors in the patient specimen or test-specific nucleic  acid in concentrations below the level of detection by this  assay.  Test developed and characteristics determined by [facility]. See Compliance Statement B: [facility]  Performed by [facility],  [address] [place] [place] [address] [phone]  [email or web address], Chencho Bojorquez MD, Lab. Director     Anaplasma phagocytoph antibody IgG IgM   Result Value Ref Range    A Phagocytophil IgG (A)      1:640  Reference range: <1:80  (Note)  INTERPRETIVE INFORMATION: A. phagocytophilum (HGA)  Antibody, IgG   Less than 1:80 - No significant level of IgG antibodies                    to A. phagocytophilum detected.   Greater than or   equal to 1:80  - Suggestive of a recent or past infection                    with A. phagocytophilum.  Test developed and characteristics determined by [facility]. See Compliance Statement B: [facility]: [place]      A Phagocytophil IgM       < 1:16  Reference range: < 1:16  (Note)  INTERPRETIVE INFORMATION: A. phagocytophilum (HGA)  Antibody, IgM   Less than 1:16 - No significant level of IgM antibodies                    to A. phagocytophilum detected.   Greater than or   equal to 1:16  - Suggestive of a current or recent                    infection with A. phagocytophilum.  Test developed and characteristics determined by [facility]. See Compliance Statement B: [employer]: [place]  Performed by [facility],  [address] [place] [address] [phone]  www.[email or web address], Chencho Bojorquez MD, Lab. Director         ASSESSMENT/PLAN:       ICD-10-CM    1. Major depression in complete remission (H) F32.5    2. Screening for diabetic retinopathy Z13.5    3. Screening for diabetic peripheral neuropathy Z13.89      Explained to patient that his symptoms may be from current  infection and we can complete the course of antibiotics.    If symptoms do not improve after completing the antibiotics, discussed increasing Paxil dose to 30 mg.  See patient instructions.     The patient understood the rational for the diagnosis and treatment plan.   All questions were answered to best of my ability and the patient's satisfaction.  I have reviewed all pertinent investigations including labs and imaging including outside records if relevent.  Hydrate well, tylenol as needed. Risks, benefits and alternatives of treatments discussed. Plan agreed on.    Will call, return to clinic, if worsening or symptoms not improving as discussed.        Patient Instructions       Preventing Lyme Disease  Ticks are small spider-like arachnids that feed on the blood of animals and humans. They can carry the bacteria that cause Lyme disease. The bacteria can pass to a person from a tick bite. (It is not passed from person to person.) Lyme disease can lead to serious health problems if it is not treated with antibiotics. The best way to prevent Lyme disease is to avoid being bitten by a tick.     The tick that carries Lyme disease is very small--about the size of a poppy seed.   Preventing tick bites  The disease is carried by the blacklegged tick, also called a  deer tick.  Young ticks called nymphs are the most likely to carry the bacteria. They are very small--about the size of a poppy seed. They can be found on deer, rodents, and birds. Often the animal brushes the tick onto leaves or other plants as it runs through the woods and then the tick lives in bushes, grasses, and dead leaves. The most active time of year for infected ticks varies by region of the country. In the East and [place], they are more active from April to September. In the West, they may be more active from December to February. But you can still be bitten at other times of the year. Below are tips for protecting yourself from tick  bites.  Protect your body    Wear clothes that protect you. Clothing can help protect you from tick bites. Wear long pants and long sleeves in outdoor areas where ticks may live. Tuck your shirt into your pants. Tuck pant legs into your socks. And wear light colors so you can more easily see ticks on your clothes.    Use insect repellent. Spray insect repellent containing at least 20 percent DEET on your exposed skin. You can also use it on clothing, shoes, and camping gear. Avoid getting DEET on children s hands, mouth, or eyes. You can use products with permethrin on clothing, shoes, and camping gear. But do not spray permethrin on skin. It will cause a rash. Follow the directions on the package of the spray you use. For more information on bug sprays, visit the National Pesticide Information Center at http://npic.[facility].edu.    Avoid tick-infested areas. Avoid brushing against grasses, bushes, and other plants. Avoid walking through dead leaves and other ground vegetation. Do not sit on fallen logs. And avoid areas with large numbers of deer and rodents.    Check yourself for ticks. After being outdoors, check your clothes and skin for ticks. Keep in mind that the ticks are about the size of a poppy seed. Use both a hand-held and a full-length mirror to view all of your skin. Pay special attention to areas with hair. Make sure to thoroughly check these areas:    Scalp    Behind the ears    Armpits    Belly button    Waist    Groin    Backs of the knees    Use the clothes dryer. Putting clothing or bedding into a clothes dryer for 1 hour at high heat has been shown to kill ticks.  Control ticks around your home    Create tick-free safety zones. Ticks that transmit Lyme disease live in ground vegetation. Ticks crawl onto people from shrubs and grasses in and around wooded areas. Cut bushes and other plants away from the deck or patio and any child play areas. Keep all grasses cut short. Remove dead leaves and other  dead vegetation. Do not put children s play equipment near wooded areas. Put wood chips or gravel on the ground between lawns and wooded areas.    Use pesticides. You can apply them yourself or hire a pest control expert. States have different regulations about pesticides. Ask your local health department for information.    Keep deer away. Deer often carry the ticks that can infect you with Lyme disease. Do not attempt to pet or feed deer. Ask your local [facility] about deer-resistant plants. Ask your local health department about deer control in your area.    Prevent ticks on pets. Pets can bring ticks into your home. Use tick control medicine as advised by your . Check your pet for ticks after it comes indoors. Pay special attention to the ears.    Ask about local tick-control methods. Some states have tick-control programs. Local health departments may be using methods that can help you control ticks at home.  If you have a tick  If you find a tick on your skin, do not panic. Most ticks don't carry Lyme disease. And the tick must be attached for 36 to 48 hours before it might infect you. If you find a tick on you, here s what to do:    If the tick is not yet attached to your skin, remove the tick with tweezers or a tissue. Flush it down the toilet. If you see a tick on your clothes, use a piece of tape to lift it off. Do not touch it with your bare hands.    If the tick is attached to your skin:    Carefully remove the tick with tweezers. If you don t have tweezers, use your fingers protected by a paper towel or a thin cloth. Grasp the tick as close to your skin as possible. Pull slowly and gently to remove the tick. The tick may not let go right away. Do not pull harder. Be patient and keep trying gently. Do not twist the head or body as you pull. Do not crush or squeeze the body. This can release the bacteria into your body. Never use a hot match, petroleum jelly, or other products to remove a  tick. (If you can t remove the tick or if part of the tick remains in the skin, call your healthcare provider right away.)    Wash your skin with soap and water after you remove the tick. This will help ensure you remove any bacteria.    If you can, save the tick in a tightly sealed glass or plastic container. Take it to your healthcare provider. He or she may be able to have someone identify if it is the type of tick that transmits Lyme.    Call your healthcare provider and describe the bite and the tick. You may be asked to come in for an exam. You may be tested for Lyme. You may also be prescribed antibiotics to help prevent infection.    Over the next month, watch for the symptoms below, especially a rash at the site of the bite.  Symptoms of Lyme disease  Call your healthcare provider if you develop any symptoms of Lyme disease, even if you don t remember being bitten. These include:    A round, red rash (called a bull s-eye rash)    Fever and chills    Tiredness or body aches    Headache or a stiff neck    Joint pain and swelling (arthritis), especially in large joints such as the knees   To learn more    Centers for Disease Control and Prevention: www.cdc.gov/lyme    American Lyme Disease Foundation: www.aldf.com  Date Last Reviewed: 11/1/2016 2000-2017 [employer]. [address] [place] [address], [place] [address]. All rights reserved. This information is not intended as a substitute for professional medical care. Always follow your healthcare professional's instructions.        Depression: Tips to Help Yourself  As your healthcare providers help treat your depression, you can also help yourself. Keep in mind that your illness affects you emotionally, physically, mentally, and socially. So full recovery will take time. Take care of your body and your soul, and be patient with yourself as you get better.    Self-care    Educate yourself. Read about treatment and medicine options. If you have the  energy, attend local conferences or support groups. Keep a list of useful websites and helpful books and use them as needed. This illness is not your fault. Don t blame yourself for your depression.    Manage early symptoms. If you notice symptoms returning, experience triggers, or identify other factors that may lead to a depressive episode, get help as soon as possible. Ask trusted friends and family to monitor your behavior and let you know if they see anything of concern.    Work with your provider. Find a provider you can trust. Communicate honestly with that person and share information on your treatment for depression and your reaction to medicines.    Be prepared for a crisis. Know what to do if you experience a crisis. Keep the phone number of a crisis hotline and know the location of your community's urgent care centers and the closest emergency department.    Hold off on big decisions. Depression can cloud your judgment. So wait until you feel better before making major life decisions, such as changing jobs, moving, or getting  or .    Be patient. Recovering from depression is a process. Don t be discouraged if it takes some time to feel better.    Keep it simple. Depression saps your energy and concentration. So you won t be able to do all the things you used to do. Set small goals and do what you can.    Be with others. Don t isolate yourself--you ll only feel worse. Try to be with other people. And take part in fun activities when you can. Go to a movie, ballgame, [employer] service, or social event. Talk openly with people you can trust. And accept help when it s offered.  Take care of your body  People with depression often lose the desire to take care of themselves. That only makes their problems worse. During treatment and afterward, make a point to:    Exercise. It s a great way to take care of your body. And studies have shown that exercise helps fight depression.    Avoid drugs and  alcohol. These may ease the pain in the short term. But they ll only make your problems worse in the long run.    Get relief from stress. Ask your healthcare provider for relaxation exercises and techniques to help relieve stress.    Eat right. A balanced and healthy diet helps keep your body healthy.  Date Last Reviewed: 1/1/2017 2000-2017 [employer]. [address] [place] [address], [place] [address]. All rights reserved. This information is not intended as a substitute for professional medical care. Always follow your healthcare professional's instructions.        Managing Fatigue     Family members can help with meals and chores around the house.   Fatigue is common. It can be caused by worry, lack of sleep, or poor appetite. Fatigue can also be a sign of anemia, a shortage of red blood cells. You might need medical treatment for anemia. The tips below can help you feel better.  Conserving energy    Keep track of the times of day when you are most tired and plan around them. For instance, if you are more tired in the afternoon, try to get tasks done in the morning.    Decide which tasks are most important. Do those first.    Pass tasks along to others when you need to. Ask for help.    Accept help when it s offered. Tell people what they can do to help. For instance, you may need someone to fix a meal, fold clothes, or put gas in your car.    Plan rest times. You may want to take a nap each day. Just sitting quietly for a few minutes can make you feel more rested.  What you can do to feel better    Relax before you try to sleep. Take a bath or read for a while.    Form a sleep pattern. Go to bed at the same time each night and get up at the same time each morning.    Eat well. Choose foods from all of the food groups each day.    Exercise. Take a brisk walk to help increase your energy.    Avoid caffeine and alcohol. Drink plenty of water or fruit juices instead.  Treating anemia  If you begin to feel  more tired than normal, tell your doctor. Fatigue could be a sign of anemia. This problem is fairly common in cancer patients, especially during chemotherapy and radiation treatments. If your red blood cell count is too low, you may get a blood transfusion. In some cases, you may need medicine to increase the number of red blood cells your body makes.  When to call your healthcare provider  Call your healthcare provider if you have:    Shortness of breath or chest pain    A dizzy feeling when you get up from lying or sitting down    Paler skin than normal    Extreme tiredness that is not helped by sleep   Date Last Reviewed: 1/3/2016    1051-1450 [employer]. [address] [place] [address]. All rights reserved. This information is not intended as a substitute for professional medical care. Always follow your healthcare professional's instructions.            Cheyenne Antoine MD  [facility]    Time: I spent 25 minutes of face- face time with patient greater than one half devoted to coordination of care for diagnosis and plan above.

## 2017-07-05 NOTE — TELEPHONE ENCOUNTER
This lab result shows past infection, not current. His IgM, which would indicate recent or current infection is negative.

## 2017-07-05 NOTE — TELEPHONE ENCOUNTER
Addendum to previous message. Review of the IgG results, reveals this is actually a positive. Apparently this was ordered by another provider who got the results, and that provider has started the patient on doxycycline today

## 2017-07-06 ENCOUNTER — OFFICE VISIT (OUTPATIENT)
Dept: FAMILY MEDICINE | Facility: OTHER | Age: 82
End: 2017-07-06
Payer: COMMERCIAL

## 2017-07-06 VITALS
TEMPERATURE: 97.8 F | WEIGHT: 170.8 LBS | HEIGHT: 71 IN | DIASTOLIC BLOOD PRESSURE: 70 MMHG | BODY MASS INDEX: 23.91 KG/M2 | SYSTOLIC BLOOD PRESSURE: 138 MMHG | HEART RATE: 84 BPM | RESPIRATION RATE: 28 BRPM

## 2017-07-06 DIAGNOSIS — Z13.89 SCREENING FOR DIABETIC PERIPHERAL NEUROPATHY: ICD-10-CM

## 2017-07-06 DIAGNOSIS — Z13.5 SCREENING FOR DIABETIC RETINOPATHY: ICD-10-CM

## 2017-07-06 DIAGNOSIS — F32.5 MAJOR DEPRESSION IN COMPLETE REMISSION (H): Primary | ICD-10-CM

## 2017-07-06 PROCEDURE — 99213 OFFICE O/P EST LOW 20 MIN: CPT | Performed by: FAMILY MEDICINE

## 2017-07-06 ASSESSMENT — ANXIETY QUESTIONNAIRES
6. BECOMING EASILY ANNOYED OR IRRITABLE: NOT AT ALL
1. FEELING NERVOUS, ANXIOUS, OR ON EDGE: SEVERAL DAYS
3. WORRYING TOO MUCH ABOUT DIFFERENT THINGS: SEVERAL DAYS
IF YOU CHECKED OFF ANY PROBLEMS ON THIS QUESTIONNAIRE, HOW DIFFICULT HAVE THESE PROBLEMS MADE IT FOR YOU TO DO YOUR WORK, TAKE CARE OF THINGS AT HOME, OR GET ALONG WITH OTHER PEOPLE: SOMEWHAT DIFFICULT
7. FEELING AFRAID AS IF SOMETHING AWFUL MIGHT HAPPEN: NOT AT ALL
GAD7 TOTAL SCORE: 5
5. BEING SO RESTLESS THAT IT IS HARD TO SIT STILL: SEVERAL DAYS
2. NOT BEING ABLE TO STOP OR CONTROL WORRYING: SEVERAL DAYS

## 2017-07-06 ASSESSMENT — PATIENT HEALTH QUESTIONNAIRE - PHQ9: 5. POOR APPETITE OR OVEREATING: SEVERAL DAYS

## 2017-07-06 ASSESSMENT — PAIN SCALES - GENERAL: PAINLEVEL: NO PAIN (0)

## 2017-07-06 NOTE — MR AVS SNAPSHOT
After Visit Summary   7/6/2017    Roque Mckeon    MRN: [id number]           Patient Information     Date Of Birth          1935        Visit Information        Provider Department      7/6/2017 9:30 AM Cheyenne Antoine MD [facility]        Today's Diagnoses     Screening for diabetic retinopathy        Screening for diabetic peripheral neuropathy          Care Instructions      Preventing Lyme Disease  Ticks are small spider-like arachnids that feed on the blood of animals and humans. They can carry the bacteria that cause Lyme disease. The bacteria can pass to a person from a tick bite. (It is not passed from person to person.) Lyme disease can lead to serious health problems if it is not treated with antibiotics. The best way to prevent Lyme disease is to avoid being bitten by a tick.     The tick that carries Lyme disease is very small--about the size of a poppy seed.   Preventing tick bites  The disease is carried by the blacklegged tick, also called a  deer tick.  Young ticks called nymphs are the most likely to carry the bacteria. They are very small--about the size of a poppy seed. They can be found on deer, rodents, and birds. Often the animal brushes the tick onto leaves or other plants as it runs through the woods and then the tick lives in bushes, grasses, and dead leaves. The most active time of year for infected ticks varies by region of the country. In the [place] and [place], they are more active from April to September. In the West, they may be more active from December to February. But you can still be bitten at other times of the year. Below are tips for protecting yourself from tick bites.  Protect your body    Wear clothes that protect you. Clothing can help protect you from tick bites. Wear long pants and long sleeves in outdoor areas where ticks may live. Tuck your shirt into your pants. Tuck pant legs into your socks. And wear light colors so you can  more easily see ticks on your clothes.    Use insect repellent. Spray insect repellent containing at least 20 percent DEET on your exposed skin. You can also use it on clothing, shoes, and camping gear. Avoid getting DEET on children s hands, mouth, or eyes. You can use products with permethrin on clothing, shoes, and camping gear. But do not spray permethrin on skin. It will cause a rash. Follow the directions on the package of the spray you use. For more information on bug sprays, visit the National Pesticide Information Center at http://npic.[facility].    Avoid tick-infested areas. Avoid brushing against grasses, bushes, and other plants. Avoid walking through dead leaves and other ground vegetation. Do not sit on fallen logs. And avoid areas with large numbers of deer and rodents.    Check yourself for ticks. After being outdoors, check your clothes and skin for ticks. Keep in mind that the ticks are about the size of a poppy seed. Use both a hand-held and a full-length mirror to view all of your skin. Pay special attention to areas with hair. Make sure to thoroughly check these areas:    Scalp    Behind the ears    Armpits    Belly button    Waist    Groin    Backs of the knees    Use the clothes dryer. Putting clothing or bedding into a clothes dryer for 1 hour at high heat has been shown to kill ticks.  Control ticks around your home    Create tick-free safety zones. Ticks that transmit Lyme disease live in ground vegetation. Ticks crawl onto people from shrubs and grasses in and around wooded areas. Cut bushes and other plants away from the deck or patio and any child play areas. Keep all grasses cut short. Remove dead leaves and other dead vegetation. Do not put children s play equipment near wooded areas. Put wood chips or gravel on the ground between lawns and wooded areas.    Use pesticides. You can apply them yourself or hire a pest control expert. States have different regulations about pesticides. Ask  your local health department for information.    Keep deer away. Deer often carry the ticks that can infect you with Lyme disease. Do not attempt to pet or feed deer. Ask your local [place] center about deer-resistant plants. Ask your local health department about deer control in your area.    Prevent ticks on pets. Pets can bring ticks into your home. Use tick control medicine as advised by your . Check your pet for ticks after it comes indoors. Pay special attention to the ears.    Ask about local tick-control methods. Some states have tick-control programs. Local health departments may be using methods that can help you control ticks at home.  If you have a tick  If you find a tick on your skin, do not panic. Most ticks don't carry Lyme disease. And the tick must be attached for 36 to 48 hours before it might infect you. If you find a tick on you, here s what to do:    If the tick is not yet attached to your skin, remove the tick with tweezers or a tissue. Flush it down the toilet. If you see a tick on your clothes, use a piece of tape to lift it off. Do not touch it with your bare hands.    If the tick is attached to your skin:    Carefully remove the tick with tweezers. If you don t have tweezers, use your fingers protected by a paper towel or a thin cloth. Grasp the tick as close to your skin as possible. Pull slowly and gently to remove the tick. The tick may not let go right away. Do not pull harder. Be patient and keep trying gently. Do not twist the head or body as you pull. Do not crush or squeeze the body. This can release the bacteria into your body. Never use a hot match, petroleum jelly, or other products to remove a tick. (If you can t remove the tick or if part of the tick remains in the skin, call your healthcare provider right away.)    Wash your skin with soap and water after you remove the tick. This will help ensure you remove any bacteria.    If you can, save the tick in a tightly  sealed glass or plastic container. Take it to your healthcare provider. He or she may be able to have someone identify if it is the type of tick that transmits Lyme.    Call your healthcare provider and describe the bite and the tick. You may be asked to come in for an exam. You may be tested for Lyme. You may also be prescribed antibiotics to help prevent infection.    Over the next month, watch for the symptoms below, especially a rash at the site of the bite.  Symptoms of Lyme disease  Call your healthcare provider if you develop any symptoms of Lyme disease, even if you don t remember being bitten. These include:    A round, red rash (called a bull s-eye rash)    Fever and chills    Tiredness or body aches    Headache or a stiff neck    Joint pain and swelling (arthritis), especially in large joints such as the knees   To learn more    Centers for Disease Control and Prevention: www.cdc.gov/lyme    American Lyme Disease Foundation: www.aldf.com  Date Last Reviewed: 11/1/2016 2000-2017 [employer]. [address] [place] [address]. All rights reserved. This information is not intended as a substitute for professional medical care. Always follow your healthcare professional's instructions.        Depression: Tips to Help Yourself  As your healthcare providers help treat your depression, you can also help yourself. Keep in mind that your illness affects you emotionally, physically, mentally, and socially. So full recovery will take time. Take care of your body and your soul, and be patient with yourself as you get better.    Self-care    Educate yourself. Read about treatment and medicine options. If you have the energy, attend local conferences or support groups. Keep a list of useful websites and helpful books and use them as needed. This illness is not your fault. Don t blame yourself for your depression.    Manage early symptoms. If you notice symptoms returning, experience triggers,  or identify other factors that may lead to a depressive episode, get help as soon as possible. Ask trusted friends and family to monitor your behavior and let you know if they see anything of concern.    Work with your provider. Find a provider you can trust. Communicate honestly with that person and share information on your treatment for depression and your reaction to medicines.    Be prepared for a crisis. Know what to do if you experience a crisis. Keep the phone number of a crisis hotline and know the location of your community's urgent care centers and the closest emergency department.    Hold off on big decisions. Depression can cloud your judgment. So wait until you feel better before making major life decisions, such as changing jobs, moving, or getting  or .    Be patient. Recovering from depression is a process. Don t be discouraged if it takes some time to feel better.    Keep it simple. Depression saps your energy and concentration. So you won t be able to do all the things you used to do. Set small goals and do what you can.    Be with others. Don t isolate yourself--you ll only feel worse. Try to be with other people. And take part in fun activities when you can. Go to a movie, ballgame, Yazidism service, or social event. Talk openly with people you can trust. And accept help when it s offered.  Take care of your body  People with depression often lose the desire to take care of themselves. That only makes their problems worse. During treatment and afterward, make a point to:    Exercise. It s a great way to take care of your body. And studies have shown that exercise helps fight depression.    Avoid drugs and alcohol. These may ease the pain in the short term. But they ll only make your problems worse in the long run.    Get relief from stress. Ask your healthcare provider for relaxation exercises and techniques to help relieve stress.    Eat right. A balanced and healthy diet helps  keep your body healthy.  Date Last Reviewed: 1/1/2017 2000-2017 [employer] [address] [place] [address], [place] [address]. All rights reserved. This information is not intended as a substitute for professional medical care. Always follow your healthcare professional's instructions.        Managing Fatigue     Family members can help with meals and chores around the house.   Fatigue is common. It can be caused by worry, lack of sleep, or poor appetite. Fatigue can also be a sign of anemia, a shortage of red blood cells. You might need medical treatment for anemia. The tips below can help you feel better.  Conserving energy    Keep track of the times of day when you are most tired and plan around them. For instance, if you are more tired in the afternoon, try to get tasks done in the morning.    Decide which tasks are most important. Do those first.    Pass tasks along to others when you need to. Ask for help.    Accept help when it s offered. Tell people what they can do to help. For instance, you may need someone to fix a meal, fold clothes, or put gas in your car.    Plan rest times. You may want to take a nap each day. Just sitting quietly for a few minutes can make you feel more rested.  What you can do to feel better    Relax before you try to sleep. Take a bath or read for a while.    Form a sleep pattern. Go to bed at the same time each night and get up at the same time each morning.    Eat well. Choose foods from all of the food groups each day.    Exercise. Take a brisk walk to help increase your energy.    Avoid caffeine and alcohol. Drink plenty of water or fruit juices instead.  Treating anemia  If you begin to feel more tired than normal, tell your doctor. Fatigue could be a sign of anemia. This problem is fairly common in cancer patients, especially during chemotherapy and radiation treatments. If your red blood cell count is too low, you may get a blood transfusion. In some cases, you may  "need medicine to increase the number of red blood cells your body makes.  When to call your healthcare provider  Call your healthcare provider if you have:    Shortness of breath or chest pain    A dizzy feeling when you get up from lying or sitting down    Paler skin than normal    Extreme tiredness that is not helped by sleep   Date Last Reviewed: 1/3/2016    0602-0231 [employer]. [address] [place] [address]. All rights reserved. This information is not intended as a substitute for professional medical care. Always follow your healthcare professional's instructions.                Follow-ups after your visit        Who to contact     If you have questions or need follow up information about today's clinic visit or your schedule please contact [facility] directly at [phone].  Normal or non-critical lab and imaging results will be communicated to you by MyChart, letter or phone within 4 business days after the clinic has received the results. If you do not hear from us within 7 days, please contact the clinic through MyChart or phone. If you have a critical or abnormal lab result, we will notify you by phone as soon as possible.  Submit refill requests through [facility] or call your pharmacy and they will forward the refill request to us. Please allow 3 business days for your refill to be completed.          Additional Information About Your Visit        [employer] Information     [employer] lets you send messages to your doctor, view your test results, renew your prescriptions, schedule appointments and more. To sign up, go to [email or web address] . Click on \"Log in\" on the left side of the screen, which will take you to the Welcome page. Then click on \"Sign up Now\" on the right side of the page.     You will be asked to enter the access code listed below, as well as some personal information. Please follow the directions to create your username and password.     Your " "access code is: [email or web address]  Expires: 2017  9:10 AM     Your access code will  in 90 days. If you need help or a new code, please call your [facility] clinic or [phone].        Care EveryWhere ID     This is your Care EveryWhere ID. This could be used by other organizations to access your [facility] medical records  [id number]        Your Vitals Were     Pulse Temperature Respirations Height BMI (Body Mass Index)       84 97.8  F (36.6  C) (Temporal) 28 5' 11\" (1.803 m) 23.82 kg/m2        Blood Pressure from Last 3 Encounters:   17 138/70   17 130/64   17 128/70    Weight from Last 3 Encounters:   17 170 lb 12.8 oz (77.5 kg)   17 169 lb 6.4 oz (76.8 kg)   17 176 lb (79.8 kg)              Today, you had the following     No orders found for display       Primary Care Provider Office Phone # Fax #    Ashlie Hanane Garcia, HETAL [facility] [phone]       [facility]  [facility]   [facility] [address]        Equal Access to Services     BENITA GRISSOM : Hadii aad ku hadasho Soomaali, waaxda luqadaha, qaybta kaalmada adeegyada, waxay idiin hayaan adeeg remi laluis . So [facility] [phone].    ATENCIÓN: Si habla español, tiene a appiah disposición servicios gratuitos de asistencia lingüística. Llame al [phone].    We comply with applicable federal civil rights laws and [place] laws. We do not discriminate on the basis of race, color, national origin, age, disability sex, sexual orientation or gender identity.            Thank you!     Thank you for choosing [facility]  for your care. Our goal is always to provide you with excellent care. Hearing back from our patients is one way we can continue to improve our services. Please take a few minutes to complete the written survey that you may receive in the mail after your visit with us. Thank you!             Your Updated Medication List - Protect others around you: Learn how to safely " use, store and throw away your medicines at www.disposemymeds.org.          This list is accurate as of: 7/6/17 10:14 AM.  Always use your most recent med list.                   Brand Name Dispense Instructions for use Diagnosis    ACE NOT PRESCRIBED (INTENTIONAL)      by Other route continuous prn.        allopurinol 300 MG tablet    ZYLOPRIM    90 tablet    Take 1 tablet by mouth daily.    Calculus of kidney       aspirin 81 MG tablet     0    1 tab po QD (Once per day)    Type II or unspecified type diabetes mellitus without mention of complication, not stated as uncontrolled       blood glucose monitoring lancets     1 Box    test 1 times daily    Type 2 diabetes mellitus without complication, without long-term current use of insulin (H)       Blood Glucose Monitoring Suppl Misc     100 each    1 strip daily Or as directed.    Type 2 diabetes, HbA1c goal < 7% (H)       blood glucose monitoring test strip    ONE TOUCH ULTRA    100 strip    Test once daily or as directed    Type 2 diabetes mellitus without complication, without long-term current use of insulin (H)       doxycycline 100 MG capsule    VIBRAMYCIN    20 capsule    Take 1 capsule (100 mg) by mouth 2 times daily    Positive Anaplasma serology       fish oil-omega-3 fatty acids 1000 MG capsule      1 capsule 2 times per day    Type II or unspecified type diabetes mellitus without mention of complication, not stated as uncontrolled       metFORMIN 1000 MG tablet    GLUCOPHAGE    90 tablet    Take 1 tablet (1,000 mg) by mouth daily (with dinner)        MULTIVITAMIN & MINERAL PO      Take 1 tablet by mouth daily        PARoxetine 20 MG tablet    PAXIL    90 tablet    Take 1 tablet (20 mg) by mouth daily        potassium citrate 10 MEQ (1080 MG) CR tablet    potassium citrate    270 tablet    Take 1 tablet by mouth 3 times daily (with meals).    Calculus of kidney       triamcinolone 0.1 % cream    KENALOG    30 g    Apply sparingly to affected area three  times daily for 14 days.    Bug bites, initial encounter, Rash and nonspecific skin eruption       ZOCOR 80 MG tablet   Generic drug:  simvastatin     90 tablet    Take 0.5 tablets (40 mg) by mouth daily    Hyperlipidemia LDL goal <100

## 2017-07-06 NOTE — NURSING NOTE
"Chief Complaint   Patient presents with     Depression     Panel Management     fall risk, honoring choices, tdap, prevnar, eye exam, foot exam, microalbumin, tsh, a1c       Initial /70 (BP Location: Right arm, Patient Position: Chair, Cuff Size: Adult Regular)  Pulse 84  Temp 97.8  F (36.6  C) (Temporal)  Resp 28  Ht 5' 11\" (1.803 m)  Wt 170 lb 12.8 oz (77.5 kg)  BMI 23.82 kg/m2 Estimated body mass index is 23.82 kg/(m^2) as calculated from the following:    Height as of this encounter: 5' 11\" (1.803 m).    Weight as of this encounter: 170 lb 12.8 oz (77.5 kg).  Medication Reconciliation: complete  Carmine Mendoza, RUSSEL    "

## 2017-07-06 NOTE — PATIENT INSTRUCTIONS
Preventing Lyme Disease  Ticks are small spider-like arachnids that feed on the blood of animals and humans. They can carry the bacteria that cause Lyme disease. The bacteria can pass to a person from a tick bite. (It is not passed from person to person.) Lyme disease can lead to serious health problems if it is not treated with antibiotics. The best way to prevent Lyme disease is to avoid being bitten by a tick.     The tick that carries Lyme disease is very small--about the size of a poppy seed.   Preventing tick bites  The disease is carried by the blacklegged tick, also called a  deer tick.  Young ticks called nymphs are the most likely to carry the bacteria. They are very small--about the size of a poppy seed. They can be found on deer, rodents, and birds. Often the animal brushes the tick onto leaves or other plants as it runs through the woods and then the tick lives in bushes, grasses, and dead leaves. The most active time of year for infected ticks varies by region of the country. In the [place] and [place], they are more active from April to September. In the West, they may be more active from December to February. But you can still be bitten at other times of the year. Below are tips for protecting yourself from tick bites.  Protect your body    Wear clothes that protect you. Clothing can help protect you from tick bites. Wear long pants and long sleeves in outdoor areas where ticks may live. Tuck your shirt into your pants. Tuck pant legs into your socks. And wear light colors so you can more easily see ticks on your clothes.    Use insect repellent. Spray insect repellent containing at least 20 percent DEET on your exposed skin. You can also use it on clothing, shoes, and camping gear. Avoid getting DEET on children s hands, mouth, or eyes. You can use products with permethrin on clothing, shoes, and camping gear. But do not spray permethrin on skin. It will cause a rash. Follow the directions on the  package of the spray you use. For more information on bug sprays, visit the National Pesticide Information Center at http://npic.[facility].edu.    Avoid tick-infested areas. Avoid brushing against grasses, bushes, and other plants. Avoid walking through dead leaves and other ground vegetation. Do not sit on fallen logs. And avoid areas with large numbers of deer and rodents.    Check yourself for ticks. After being outdoors, check your clothes and skin for ticks. Keep in mind that the ticks are about the size of a poppy seed. Use both a hand-held and a full-length mirror to view all of your skin. Pay special attention to areas with hair. Make sure to thoroughly check these areas:    Scalp    Behind the ears    Armpits    Belly button    Waist    Groin    Backs of the knees    Use the clothes dryer. Putting clothing or bedding into a clothes dryer for 1 hour at high heat has been shown to kill ticks.  Control ticks around your home    Create tick-free safety zones. Ticks that transmit Lyme disease live in ground vegetation. Ticks crawl onto people from shrubs and grasses in and around wooded areas. Cut bushes and other plants away from the deck or patio and any child play areas. Keep all grasses cut short. Remove dead leaves and other dead vegetation. Do not put children s play equipment near wooded areas. Put wood chips or gravel on the ground between lawns and wooded areas.    Use pesticides. You can apply them yourself or hire a pest control expert. States have different regulations about pesticides. Ask your local health department for information.    Keep deer away. Deer often carry the ticks that can infect you with Lyme disease. Do not attempt to pet or feed deer. Ask your local [employer] center about deer-resistant plants. Ask your local health department about deer control in your area.    Prevent ticks on pets. Pets can bring ticks into your home. Use tick control medicine as advised by your . Check  your pet for ticks after it comes indoors. Pay special attention to the ears.    Ask about local tick-control methods. Some states have tick-control programs. Local health departments may be using methods that can help you control ticks at home.  If you have a tick  If you find a tick on your skin, do not panic. Most ticks don't carry Lyme disease. And the tick must be attached for 36 to 48 hours before it might infect you. If you find a tick on you, here s what to do:    If the tick is not yet attached to your skin, remove the tick with tweezers or a tissue. Flush it down the toilet. If you see a tick on your clothes, use a piece of tape to lift it off. Do not touch it with your bare hands.    If the tick is attached to your skin:    Carefully remove the tick with tweezers. If you don t have tweezers, use your fingers protected by a paper towel or a thin cloth. Grasp the tick as close to your skin as possible. Pull slowly and gently to remove the tick. The tick may not let go right away. Do not pull harder. Be patient and keep trying gently. Do not twist the head or body as you pull. Do not crush or squeeze the body. This can release the bacteria into your body. Never use a hot match, petroleum jelly, or other products to remove a tick. (If you can t remove the tick or if part of the tick remains in the skin, call your healthcare provider right away.)    Wash your skin with soap and water after you remove the tick. This will help ensure you remove any bacteria.    If you can, save the tick in a tightly sealed glass or plastic container. Take it to your healthcare provider. He or she may be able to have someone identify if it is the type of tick that transmits Lyme.    Call your healthcare provider and describe the bite and the tick. You may be asked to come in for an exam. You may be tested for Lyme. You may also be prescribed antibiotics to help prevent infection.    Over the next month, watch for the symptoms  below, especially a rash at the site of the bite.  Symptoms of Lyme disease  Call your healthcare provider if you develop any symptoms of Lyme disease, even if you don t remember being bitten. These include:    A round, red rash (called a bull s-eye rash)    Fever and chills    Tiredness or body aches    Headache or a stiff neck    Joint pain and swelling (arthritis), especially in large joints such as the knees   To learn more    Centers for Disease Control and Prevention: www.cdc.gov/lyme    American Lyme Disease Foundation: www.aldf.com  Date Last Reviewed: 11/1/2016 2000-2017 [employer]. [address] [place] [address]. All rights reserved. This information is not intended as a substitute for professional medical care. Always follow your healthcare professional's instructions.        Depression: Tips to Help Yourself  As your healthcare providers help treat your depression, you can also help yourself. Keep in mind that your illness affects you emotionally, physically, mentally, and socially. So full recovery will take time. Take care of your body and your soul, and be patient with yourself as you get better.    Self-care    Educate yourself. Read about treatment and medicine options. If you have the energy, attend local conferences or support groups. Keep a list of useful websites and helpful books and use them as needed. This illness is not your fault. Don t blame yourself for your depression.    Manage early symptoms. If you notice symptoms returning, experience triggers, or identify other factors that may lead to a depressive episode, get help as soon as possible. Ask trusted friends and family to monitor your behavior and let you know if they see anything of concern.    Work with your provider. Find a provider you can trust. Communicate honestly with that person and share information on your treatment for depression and your reaction to medicines.    Be prepared for a crisis. Know  what to do if you experience a crisis. Keep the phone number of a crisis hotline and know the location of your community's urgent care centers and the closest emergency department.    Hold off on big decisions. Depression can cloud your judgment. So wait until you feel better before making major life decisions, such as changing jobs, moving, or getting  or .    Be patient. Recovering from depression is a process. Don t be discouraged if it takes some time to feel better.    Keep it simple. Depression saps your energy and concentration. So you won t be able to do all the things you used to do. Set small goals and do what you can.    Be with others. Don t isolate yourself--you ll only feel worse. Try to be with other people. And take part in fun activities when you can. Go to a movie, ballgame, [employer]ian[employer] service, or social event. Talk openly with people you can trust. And accept help when it s offered.  Take care of your body  People with depression often lose the desire to take care of themselves. That only makes their problems worse. During treatment and afterward, make a point to:    Exercise. It s a great way to take care of your body. And studies have shown that exercise helps fight depression.    Avoid drugs and alcohol. These may ease the pain in the short term. But they ll only make your problems worse in the long run.    Get relief from stress. Ask your healthcare provider for relaxation exercises and techniques to help relieve stress.    Eat right. A balanced and healthy diet helps keep your body healthy.  Date Last Reviewed: 1/1/2017 2000-2017 [employer]. [address] [place] [address], [place] [address]. All rights reserved. This information is not intended as a substitute for professional medical care. Always follow your healthcare professional's instructions.        Managing Fatigue     Family members can help with meals and chores around the house.   Fatigue is common. It can be  caused by worry, lack of sleep, or poor appetite. Fatigue can also be a sign of anemia, a shortage of red blood cells. You might need medical treatment for anemia. The tips below can help you feel better.  Conserving energy    Keep track of the times of day when you are most tired and plan around them. For instance, if you are more tired in the afternoon, try to get tasks done in the morning.    Decide which tasks are most important. Do those first.    Pass tasks along to others when you need to. Ask for help.    Accept help when it s offered. Tell people what they can do to help. For instance, you may need someone to fix a meal, fold clothes, or put gas in your car.    Plan rest times. You may want to take a nap each day. Just sitting quietly for a few minutes can make you feel more rested.  What you can do to feel better    Relax before you try to sleep. Take a bath or read for a while.    Form a sleep pattern. Go to bed at the same time each night and get up at the same time each morning.    Eat well. Choose foods from all of the food groups each day.    Exercise. Take a brisk walk to help increase your energy.    Avoid caffeine and alcohol. Drink plenty of water or fruit juices instead.  Treating anemia  If you begin to feel more tired than normal, tell your doctor. Fatigue could be a sign of anemia. This problem is fairly common in cancer patients, especially during chemotherapy and radiation treatments. If your red blood cell count is too low, you may get a blood transfusion. In some cases, you may need medicine to increase the number of red blood cells your body makes.  When to call your healthcare provider  Call your healthcare provider if you have:    Shortness of breath or chest pain    A dizzy feeling when you get up from lying or sitting down    Paler skin than normal    Extreme tiredness that is not helped by sleep   Date Last Reviewed: 1/3/2016    5390-3323 [employer]. [address] [place] [address]  Road, [place]EE[place]ung [address]. All rights reserved. This information is not intended as a substitute for professional medical care. Always follow your healthcare professional's instructions.

## 2017-07-07 ASSESSMENT — PATIENT HEALTH QUESTIONNAIRE - PHQ9: SUM OF ALL RESPONSES TO PHQ QUESTIONS 1-9: 7

## 2017-07-07 ASSESSMENT — ANXIETY QUESTIONNAIRES: GAD7 TOTAL SCORE: 5

## 2017-07-18 ENCOUNTER — TELEPHONE (OUTPATIENT)
Dept: FAMILY MEDICINE | Facility: OTHER | Age: 82
End: 2017-07-18

## 2017-07-18 NOTE — TELEPHONE ENCOUNTER
Reason for call:  Symptom  Reason for call:  Patient reporting a symptom    Symptom or request: tick bite    Duration (how long have symptoms been present): a couple of weeks    Have you been treated for this before? Yes    Additional comments: calling to fu on tick bite. Was told to call if not any better after a couple of weeks and he is not.    Phone Number patient can be reached at:  Home number on file [phone] (home)    Best Time:  any    Can we leave a detailed message on this number:  YES    Call taken on 7/18/2017 at 3:19 PM by Deena Maldonado

## 2017-07-18 NOTE — TELEPHONE ENCOUNTER
Michelle Ospina's note. I will treat rash with a topical cream and get labs to evaluate for tick borne illness, I do think these lesions are from a different insect bite and should resolve in the next few days, if having any signs of infection or spreading rash follow up in clinic for further evaluation. We will follow up with lab results.     Called patient and he states the bites are better but he is having different symptoms like depression sx. I told patient if he is having depression sx he should have an office visit. Patient scheduled with Dr. Antoine on Thursday Morning.  Kathie Nix MA

## 2017-07-18 NOTE — PROGRESS NOTES
SUBJECTIVE:                                                    Roque Mckeon is a 81 year old male who presents to clinic today for the following health issues:    Depression Followup    Status since last visit: Worsened- Has been on Paxil for years but started feeling more depressed and he's thinking maybe he should increase his dose.    See PHQ-9 for current symptoms.  Other associated symptoms: None    Complicating factors:   Significant life event:  Yes-  Lost a close relative and another relative is in the hospital.   Current substance abuse:  None  Anxiety or Panic symptoms:  Yes- little bit    PHQ-9  English  PHQ-9   Any Language    Amount of exercise or physical activity: 2-3 days/week for an average of 30-45 minutes    Problems taking medications regularly: No    Medication side effects: none    Diet: diabetic- 11 years      PROBLEMS TO ADD ON...    Problem list and histories reviewed & adjusted, as indicated.  Additional history: as documented    Current Outpatient Prescriptions   Medication Sig Dispense Refill     PARoxetine (PAXIL) 30 MG tablet Take 1 tablet (30 mg) by mouth At Bedtime 90 tablet 1     triamcinolone (KENALOG) 0.1 % cream Apply sparingly to affected area three times daily for 14 days. 30 g 0     PARoxetine (PAXIL) 20 MG tablet Take 1 tablet (20 mg) by mouth daily 90 tablet 3     blood glucose monitoring (ONE TOUCH ULTRA) test strip Test once daily or as directed 100 strip 3     blood glucose monitoring (ONE TOUCH ULTRASOFT) lancets test 1 times daily 1 Box 3     metFORMIN (GLUCOPHAGE) 1000 MG tablet Take 1 tablet (1,000 mg) by mouth daily (with dinner) 90 tablet 3     simvastatin (ZOCOR) 80 MG tablet Take 0.5 tablets (40 mg) by mouth daily 90 tablet 3     Blood Glucose Monitoring Suppl MISC 1 strip daily Or as directed. 100 each 0     Multiple Vitamins-Minerals (MULTIVITAMIN & MINERAL PO) Take 1 tablet by mouth daily       potassium citrate (UROCIT-K 10) 10 MEQ (1080 MG) SR tablet  "Take 1 tablet by mouth 3 times daily (with meals). 270 tablet 3     allopurinol (ZYLOPRIM) 300 MG tablet Take 1 tablet by mouth daily. 90 tablet 3     FISH OIL 1000 MG OR CAPS 1 capsule 2 times per day  0     ASPIRIN 81 MG OR TABS 1 tab po QD (Once per day) 0 0     ACE NOT PRESCRIBED, INTENTIONAL, by Other route continuous prn.  0     Allergies   Allergen Reactions     No Known Drug Allergies        Reviewed and updated as needed this visit by clinical staff       Reviewed and updated as needed this visit by Provider         ROS:  Constitutional, HEENT, cardiovascular, pulmonary, gi and gu systems are negative, except as otherwise noted.      OBJECTIVE:   /78  Pulse 80  Temp 97.5  F (36.4  C) (Temporal)  Resp 16  Ht 5' 11\" (1.803 m)  Wt 168 lb 12.8 oz (76.6 kg)  BMI 23.54 kg/m2  Body mass index is 23.54 kg/(m^2).  GENERAL: healthy, alert and no distress  NECK: no adenopathy  RESP: lungs clear to auscultation - no rales, rhonchi or wheezes  CV: regular rate and rhythm, normal S1 S2, no murmur,    ASSESSMENT/PLAN:       ICD-10-CM    1. Anxiety and depression F41.9 PARoxetine (PAXIL) 30 MG tablet    F32.9    2. Screening for diabetic peripheral neuropathy Z13.89 FOOT EXAM  NO CHARGE [87562.114]   3. Need for prophylactic vaccination against Streptococcus pneumoniae (pneumococcus) Z23    4. Need for prophylactic vaccination with tetanus-diphtheria (TD) Z23      The patient understood the rational for the diagnosis and treatment plan.   All questions were answered to best of my ability and the patient's satisfaction.  Will call, return to clinic, if worsening or symptoms not improving as discussed.  Follow up in 4 weeks, advised counseling. [facility].      Patient Instructions       Treating Anxiety Disorders with Therapy    If you have an anxiety disorder, you don t have to suffer anymore. Treatment is available. Therapy (also called counseling) is often " a helpful treatment for anxiety disorders. With therapy, a specially trained professional (therapist) helps you face and learn to manage your anxiety. Therapy can be short-term or long-term depending on your needs. In some cases, medicine may also be prescribed with therapy. It may take time before you notice how much therapy is helping, but stick with it. With therapy, you can feel better.  Cognitive behavioral therapy (CBT)  Cognitive behavioral therapy (CBT) teaches you to manage anxiety. It does this by helping you understand how you think and act when you re anxious. Research has shown CBT to be a very effective treatment for anxiety disorders. How CBT is run is almost like a class. It involves homework and activities to build skills that teach you to cope with anxiety step by step. It can be done in a group or one-on-one, and often takes place for a set number of sessions. CBT has two main parts:    Cognitive therapy helps you identify the negative, irrational thoughts that occur with your anxiety. You ll learn to replace these with more positive, realistic thoughts.    Behavioral therapy helps you change how you react to anxiety. You ll learn coping skills and methods for relaxing to help you better deal with anxiety.  Other forms of therapy  Other therapy methods may work better for you than CBT. Or, you may move from CBT to another form of therapy as your treatment needs change. This may mean meeting with a therapist by yourself or in a group. Therapy can also help you work through problems in your life, such as drug or alcohol dependence, that may be making your anxiety worse.  Getting better takes time  Therapy will help you feel better and teach you skills to help manage anxiety long term. But change doesn t happen right away. It takes a commitment from you. And treatment only works if you learn to face the causes of your anxiety. So, you might feel worse before you feel better. This can sometimes make  it hard to stick with it. But remember: Therapy is a very effective treatment. The results will be well worth it.  Helping yourself  If anxiety is wearing you down, here are some things you can do to cope:    Check with your doctor and rule out any physical problems that may be causing the anxiety symptoms.    If an anxiety disorder is diagnosed, seek mental healthcare. This is an illness and it can respond to treatment. Most types of anxiety disorders will respond to talk therapy and medicine.    Educate yourself about anxiety disorders. Keep track of helpful online resources and books you can use during stressful periods.    Try stress management techniques such as meditation.    Consider online or in-person support groups.    Don t fight your feelings. Anxiety feeds itself. The more you worry about it, the worse it gets. Instead, try to identify what might have triggered your anxiety. Then try to put this threat in perspective.    Keep in mind that you can t control everything about a situation. Change what you can and let the rest take its course.    Exercise -- it s a great way to relieve tension and help your body feel relaxed.    Examine your life for stress, and try to find ways to reduce it.    Avoid caffeine and nicotine, which can make anxiety symptoms worse.    Fight the temptation to turn to alcohol or unprescribed drugs for relief. They only make things worse in the long run.   Date Last Reviewed: 1/1/2017 2000-2017 [employer]. [address] [place] [address]. All rights reserved. This information is not intended as a substitute for professional medical care. Always follow your healthcare professional's instructions.        Depression: Tips to Help Yourself  As your healthcare providers help treat your depression, you can also help yourself. Keep in mind that your illness affects you emotionally, physically, mentally, and socially. So full recovery will take time. Take care  of your body and your soul, and be patient with yourself as you get better.    Self-care    Educate yourself. Read about treatment and medicine options. If you have the energy, attend local conferences or support groups. Keep a list of useful websites and helpful books and use them as needed. This illness is not your fault. Don t blame yourself for your depression.    Manage early symptoms. If you notice symptoms returning, experience triggers, or identify other factors that may lead to a depressive episode, get help as soon as possible. Ask trusted friends and family to monitor your behavior and let you know if they see anything of concern.    Work with your provider. Find a provider you can trust. Communicate honestly with that person and share information on your treatment for depression and your reaction to medicines.    Be prepared for a crisis. Know what to do if you experience a crisis. Keep the phone number of a crisis hotline and know the location of your community's urgent care centers and the closest emergency department.    Hold off on big decisions. Depression can cloud your judgment. So wait until you feel better before making major life decisions, such as changing jobs, moving, or getting  or .    Be patient. Recovering from depression is a process. Don t be discouraged if it takes some time to feel better.    Keep it simple. Depression saps your energy and concentration. So you won t be able to do all the things you used to do. Set small goals and do what you can.    Be with others. Don t isolate yourself--you ll only feel worse. Try to be with other people. And take part in fun activities when you can. Go to a movie, ballgame, [employer] service, or social event. Talk openly with people you can trust. And accept help when it s offered.  Take care of your body  People with depression often lose the desire to take care of themselves. That only makes their problems worse. During treatment  and afterward, make a point to:    Exercise. It s a great way to take care of your body. And studies have shown that exercise helps fight depression.    Avoid drugs and alcohol. These may ease the pain in the short term. But they ll only make your problems worse in the long run.    Get relief from stress. Ask your healthcare provider for relaxation exercises and techniques to help relieve stress.    Eat right. A balanced and healthy diet helps keep your body healthy.  Date Last Reviewed: 1/1/2017 2000-2017 [employer]. [address] [place] [address], [place] [address]. All rights reserved. This information is not intended as a substitute for professional medical care. Always follow your healthcare professional's instructions.            Cheyenne Antoine MD  [facility]

## 2017-07-20 ENCOUNTER — OFFICE VISIT (OUTPATIENT)
Dept: FAMILY MEDICINE | Facility: OTHER | Age: 82
End: 2017-07-20
Payer: COMMERCIAL

## 2017-07-20 VITALS
DIASTOLIC BLOOD PRESSURE: 78 MMHG | WEIGHT: 168.8 LBS | TEMPERATURE: 97.5 F | RESPIRATION RATE: 16 BRPM | HEART RATE: 80 BPM | HEIGHT: 71 IN | BODY MASS INDEX: 23.63 KG/M2 | SYSTOLIC BLOOD PRESSURE: 126 MMHG

## 2017-07-20 DIAGNOSIS — Z23 NEED FOR PROPHYLACTIC VACCINATION WITH TETANUS-DIPHTHERIA (TD): ICD-10-CM

## 2017-07-20 DIAGNOSIS — Z13.89 SCREENING FOR DIABETIC PERIPHERAL NEUROPATHY: ICD-10-CM

## 2017-07-20 DIAGNOSIS — F32.A ANXIETY AND DEPRESSION: Primary | ICD-10-CM

## 2017-07-20 DIAGNOSIS — F41.9 ANXIETY AND DEPRESSION: Primary | ICD-10-CM

## 2017-07-20 DIAGNOSIS — Z23 NEED FOR PROPHYLACTIC VACCINATION AGAINST STREPTOCOCCUS PNEUMONIAE (PNEUMOCOCCUS): ICD-10-CM

## 2017-07-20 PROCEDURE — 99213 OFFICE O/P EST LOW 20 MIN: CPT | Performed by: FAMILY MEDICINE

## 2017-07-20 RX ORDER — PAROXETINE 30 MG/1
30 TABLET, FILM COATED ORAL AT BEDTIME
Qty: 90 TABLET | Refills: 1 | Status: SHIPPED | OUTPATIENT
Start: 2017-07-20 | End: 2018-01-04

## 2017-07-20 ASSESSMENT — ANXIETY QUESTIONNAIRES
IF YOU CHECKED OFF ANY PROBLEMS ON THIS QUESTIONNAIRE, HOW DIFFICULT HAVE THESE PROBLEMS MADE IT FOR YOU TO DO YOUR WORK, TAKE CARE OF THINGS AT HOME, OR GET ALONG WITH OTHER PEOPLE: VERY DIFFICULT
3. WORRYING TOO MUCH ABOUT DIFFERENT THINGS: MORE THAN HALF THE DAYS
5. BEING SO RESTLESS THAT IT IS HARD TO SIT STILL: SEVERAL DAYS
GAD7 TOTAL SCORE: 12
6. BECOMING EASILY ANNOYED OR IRRITABLE: SEVERAL DAYS
7. FEELING AFRAID AS IF SOMETHING AWFUL MIGHT HAPPEN: MORE THAN HALF THE DAYS
2. NOT BEING ABLE TO STOP OR CONTROL WORRYING: MORE THAN HALF THE DAYS
1. FEELING NERVOUS, ANXIOUS, OR ON EDGE: MORE THAN HALF THE DAYS

## 2017-07-20 ASSESSMENT — PATIENT HEALTH QUESTIONNAIRE - PHQ9: 5. POOR APPETITE OR OVEREATING: MORE THAN HALF THE DAYS

## 2017-07-20 ASSESSMENT — PAIN SCALES - GENERAL: PAINLEVEL: NO PAIN (0)

## 2017-07-20 NOTE — NURSING NOTE
"Chief Complaint   Patient presents with     Depression     Panel Management     mychart, Tdap, Prevnar, honoring choices, lipid, cmp, a1c, microalbumin, eye exam, foot exam, TSH, phq9/uriel       Initial /78  Pulse 80  Temp 97.5  F (36.4  C) (Temporal)  Resp 16  Ht 5' 11\" (1.803 m)  Wt 168 lb 12.8 oz (76.6 kg)  BMI 23.54 kg/m2 Estimated body mass index is 23.54 kg/(m^2) as calculated from the following:    Height as of this encounter: 5' 11\" (1.803 m).    Weight as of this encounter: 168 lb 12.8 oz (76.6 kg).  Medication Reconciliation: complete    "

## 2017-07-20 NOTE — MR AVS SNAPSHOT
After Visit Summary   7/20/2017    Roque Mckeon    MRN: [id number]           Patient Information     Date Of Birth          1935        Visit Information        Provider Department      7/20/2017 9:10 AM Cheyenne Antoine MD [facility]        Today's Diagnoses     Anxiety and depression    -  1    Screening for diabetic peripheral neuropathy        Need for prophylactic vaccination against Streptococcus pneumoniae (pneumococcus)        Need for prophylactic vaccination with tetanus-diphtheria (TD)          Care Instructions      Treating Anxiety Disorders with Therapy    If you have an anxiety disorder, you don t have to suffer anymore. Treatment is available. Therapy (also called counseling) is often a helpful treatment for anxiety disorders. With therapy, a specially trained professional (therapist) helps you face and learn to manage your anxiety. Therapy can be short-term or long-term depending on your needs. In some cases, medicine may also be prescribed with therapy. It may take time before you notice how much therapy is helping, but stick with it. With therapy, you can feel better.  Cognitive behavioral therapy (CBT)  Cognitive behavioral therapy (CBT) teaches you to manage anxiety. It does this by helping you understand how you think and act when you re anxious. Research has shown CBT to be a very effective treatment for anxiety disorders. How CBT is run is almost like a class. It involves homework and activities to build skills that teach you to cope with anxiety step by step. It can be done in a group or one-on-one, and often takes place for a set number of sessions. CBT has two main parts:    Cognitive therapy helps you identify the negative, irrational thoughts that occur with your anxiety. You ll learn to replace these with more positive, realistic thoughts.    Behavioral therapy helps you change how you react to anxiety. You ll learn coping skills and methods  for relaxing to help you better deal with anxiety.  Other forms of therapy  Other therapy methods may work better for you than CBT. Or, you may move from CBT to another form of therapy as your treatment needs change. This may mean meeting with a therapist by yourself or in a group. Therapy can also help you work through problems in your life, such as drug or alcohol dependence, that may be making your anxiety worse.  Getting better takes time  Therapy will help you feel better and teach you skills to help manage anxiety long term. But change doesn t happen right away. It takes a commitment from you. And treatment only works if you learn to face the causes of your anxiety. So, you might feel worse before you feel better. This can sometimes make it hard to stick with it. But remember: Therapy is a very effective treatment. The results will be well worth it.  Helping yourself  If anxiety is wearing you down, here are some things you can do to cope:    Check with your doctor and rule out any physical problems that may be causing the anxiety symptoms.    If an anxiety disorder is diagnosed, seek mental healthcare. This is an illness and it can respond to treatment. Most types of anxiety disorders will respond to talk therapy and medicine.    Educate yourself about anxiety disorders. Keep track of helpful online resources and books you can use during stressful periods.    Try stress management techniques such as meditation.    Consider online or in-person support groups.    Don t fight your feelings. Anxiety feeds itself. The more you worry about it, the worse it gets. Instead, try to identify what might have triggered your anxiety. Then try to put this threat in perspective.    Keep in mind that you can t control everything about a situation. Change what you can and let the rest take its course.    Exercise -- it s a great way to relieve tension and help your body feel relaxed.    Examine your life for stress, and try to  find ways to reduce it.    Avoid caffeine and nicotine, which can make anxiety symptoms worse.    Fight the temptation to turn to alcohol or unprescribed drugs for relief. They only make things worse in the long run.   Date Last Reviewed: 1/1/2017 2000-2017 [employer]. [address] [place] [id number]. All rights reserved. This information is not intended as a substitute for professional medical care. Always follow your healthcare professional's instructions.        Depression: Tips to Help Yourself  As your healthcare providers help treat your depression, you can also help yourself. Keep in mind that your illness affects you emotionally, physically, mentally, and socially. So full recovery will take time. Take care of your body and your soul, and be patient with yourself as you get better.    Self-care    Educate yourself. Read about treatment and medicine options. If you have the energy, attend local conferences or support groups. Keep a list of useful websites and helpful books and use them as needed. This illness is not your fault. Don t blame yourself for your depression.    Manage early symptoms. If you notice symptoms returning, experience triggers, or identify other factors that may lead to a depressive episode, get help as soon as possible. Ask trusted friends and family to monitor your behavior and let you know if they see anything of concern.    Work with your provider. Find a provider you can trust. Communicate honestly with that person and share information on your treatment for depression and your reaction to medicines.    Be prepared for a crisis. Know what to do if you experience a crisis. Keep the phone number of a crisis hotline and know the location of your community's urgent care centers and the closest emergency department.    Hold off on big decisions. Depression can cloud your judgment. So wait until you feel better before making major life decisions, such as  changing jobs, moving, or getting  or .    Be patient. Recovering from depression is a process. Don t be discouraged if it takes some time to feel better.    Keep it simple. Depression saps your energy and concentration. So you won t be able to do all the things you used to do. Set small goals and do what you can.    Be with others. Don t isolate yourself--you ll only feel worse. Try to be with other people. And take part in fun activities when you can. Go to a movie, [employer], Worship service, or social event. Talk openly with people you can trust. And accept help when it s offered.  Take care of your body  People with depression often lose the desire to take care of themselves. That only makes their problems worse. During treatment and afterward, make a point to:    Exercise. It s a great way to take care of your body. And studies have shown that exercise helps fight depression.    Avoid drugs and alcohol. These may ease the pain in the short term. But they ll only make your problems worse in the long run.    Get relief from stress. Ask your healthcare provider for relaxation exercises and techniques to help relieve stress.    Eat right. A balanced and healthy diet helps keep your body healthy.  Date Last Reviewed: 1/1/2017 2000-2017 [employer]. [address] [place] [address], [place] [address]. All rights reserved. This information is not intended as a substitute for professional medical care. Always follow your healthcare professional's instructions.                Follow-ups after your visit        Who to contact     If you have questions or need follow up information about today's clinic visit or your schedule please contact [facility] directly at [phone].  Normal or non-critical lab and imaging results will be communicated to you by MyChart, letter or phone within 4 business days after the clinic has received the results. If you do not hear from us within 7  "days, please contact the clinic through [employer] or phone. If you have a critical or abnormal lab result, we will notify you by phone as soon as possible.  Submit refill requests through [employer] or call your pharmacy and they will forward the refill request to us. Please allow 3 business days for your refill to be completed.          Additional Information About Your Visit        [employer] Information     [employer] lets you send messages to your doctor, view your test results, renew your prescriptions, schedule appointments and more. To sign up, go to [email or web address] . Click on \"Log in\" on the left side of the screen, which will take you to the Welcome page. Then click on \"Sign up Now\" on the right side of the page.     You will be asked to enter the access code listed below, as well as some personal information. Please follow the directions to create your username and password.     Your access code is: [email or web address]  Expires: 10/18/2017  9:46 AM     Your access code will  in 90 days. If you need help or a new code, please call your [facility] clinic or [phone].        Care EveryWhere ID     This is your Care EveryWhere ID. This could be used by other organizations to access your [facility] medical records  [id number]        Your Vitals Were     Pulse Temperature Respirations Height BMI (Body Mass Index)       80 97.5  F (36.4  C) (Temporal) 16 5' 11\" (1.803 m) 23.54 kg/m2        Blood Pressure from Last 3 Encounters:   17 126/78   17 138/70   17 130/64    Weight from Last 3 Encounters:   17 168 lb 12.8 oz (76.6 kg)   17 170 lb 12.8 oz (77.5 kg)   17 169 lb 6.4 oz (76.8 kg)              Today, you had the following     No orders found for display         Today's Medication Changes          These changes are accurate as of: 17  9:46 AM.  If you have any questions, ask your nurse or doctor.               These medicines have changed or have updated prescriptions.     "    Dose/Directions    * PARoxetine 20 MG tablet   Commonly known as:  PAXIL   This may have changed:  Another medication with the same name was added. Make sure you understand how and when to take each.   Changed by:  Ashlie Garcia APRN CNP        Dose:  20 mg   Take 1 tablet (20 mg) by mouth daily   Quantity:  90 tablet   Refills:  3       * PARoxetine 30 MG tablet   Commonly known as:  PAXIL   This may have changed:  You were already taking a medication with the same name, and this prescription was added. Make sure you understand how and when to take each.   Used for:  Anxiety and depression   Changed by:  Cheyenne Antoine MD        Dose:  30 mg   Take 1 tablet (30 mg) by mouth At Bedtime   Quantity:  90 tablet   Refills:  1       * Notice:  This list has 2 medication(s) that are the same as other medications prescribed for you. Read the directions carefully, and ask your doctor or other care provider to review them with you.         Where to get your medicines      These medications were sent to [id number] - [place], [place] - [address] [facility]  [address] [place] [address]    Hours:  [facility] [place] - numbers unchanged   9/8/03  Phone:  [phone]     PARoxetine 30 MG tablet                Primary Care Provider Office Phone # Fax #    HETAL Ramos [facility] [phone]       [facility] ESTEFAN[facility]  [address] [facility]   [facility] [place] [address]        Equal Access to Services     BENITA GRISSOM AH: Hadii christelle ku hadasho Soomaali, waaxda luqadaha, qaybta kaalmada adeegyada, serena farfan. So [facility] [phone].    ATENCIÓN: Si habla español, tiene a appiah disposición servicios gratuitos de asistencia lingüística. Monica al [phone].    We comply with applicable federal civil rights laws and [place] laws. We do not discriminate on the basis of race, color, national origin, age, disability sex, sexual orientation or gender identity.            Thank you!     Thank  you for choosing [facility]  for your care. Our goal is always to provide you with excellent care. Hearing back from our patients is one way we can continue to improve our services. Please take a few minutes to complete the written survey that you may receive in the mail after your visit with us. Thank you!             Your Updated Medication List - Protect others around you: Learn how to safely use, store and throw away your medicines at www.disposemymeds.org.          This list is accurate as of: 7/20/17  9:46 AM.  Always use your most recent med list.                   Brand Name Dispense Instructions for use Diagnosis    ACE NOT PRESCRIBED (INTENTIONAL)      by Other route continuous prn.        allopurinol 300 MG tablet    ZYLOPRIM    90 tablet    Take 1 tablet by mouth daily.    Calculus of kidney       aspirin 81 MG tablet     0    1 tab po QD (Once per day)    Type II or unspecified type diabetes mellitus without mention of complication, not stated as uncontrolled       blood glucose monitoring lancets     1 Box    test 1 times daily    Type 2 diabetes mellitus without complication, without long-term current use of insulin (H)       Blood Glucose Monitoring Suppl Misc     100 each    1 strip daily Or as directed.    Type 2 diabetes, HbA1c goal < 7% (H)       blood glucose monitoring test strip    ONE TOUCH ULTRA    100 strip    Test once daily or as directed    Type 2 diabetes mellitus without complication, without long-term current use of insulin (H)       fish oil-omega-3 fatty acids 1000 MG capsule      1 capsule 2 times per day    Type II or unspecified type diabetes mellitus without mention of complication, not stated as uncontrolled       metFORMIN 1000 MG tablet    GLUCOPHAGE    90 tablet    Take 1 tablet (1,000 mg) by mouth daily (with dinner)        MULTIVITAMIN & MINERAL PO      Take 1 tablet by mouth daily        * PARoxetine 20 MG tablet    PAXIL    90 tablet    Take 1 tablet (20  mg) by mouth daily        * PARoxetine 30 MG tablet    PAXIL    90 tablet    Take 1 tablet (30 mg) by mouth At Bedtime    Anxiety and depression       potassium citrate 10 MEQ (1080 MG) CR tablet    potassium citrate    270 tablet    Take 1 tablet by mouth 3 times daily (with meals).    Calculus of kidney       triamcinolone 0.1 % cream    KENALOG    30 g    Apply sparingly to affected area three times daily for 14 days.    Bug bites, initial encounter, Rash and nonspecific skin eruption       ZOCOR 80 MG tablet   Generic drug:  simvastatin     90 tablet    Take 0.5 tablets (40 mg) by mouth daily    Hyperlipidemia LDL goal <100       * Notice:  This list has 2 medication(s) that are the same as other medications prescribed for you. Read the directions carefully, and ask your doctor or other care provider to review them with you.

## 2017-07-20 NOTE — PATIENT INSTRUCTIONS
Treating Anxiety Disorders with Therapy    If you have an anxiety disorder, you don t have to suffer anymore. Treatment is available. Therapy (also called counseling) is often a helpful treatment for anxiety disorders. With therapy, a specially trained professional (therapist) helps you face and learn to manage your anxiety. Therapy can be short-term or long-term depending on your needs. In some cases, medicine may also be prescribed with therapy. It may take time before you notice how much therapy is helping, but stick with it. With therapy, you can feel better.  Cognitive behavioral therapy (CBT)  Cognitive behavioral therapy (CBT) teaches you to manage anxiety. It does this by helping you understand how you think and act when you re anxious. Research has shown CBT to be a very effective treatment for anxiety disorders. How CBT is run is almost like a class. It involves homework and activities to build skills that teach you to cope with anxiety step by step. It can be done in a group or one-on-one, and often takes place for a set number of sessions. CBT has two main parts:    Cognitive therapy helps you identify the negative, irrational thoughts that occur with your anxiety. You ll learn to replace these with more positive, realistic thoughts.    Behavioral therapy helps you change how you react to anxiety. You ll learn coping skills and methods for relaxing to help you better deal with anxiety.  Other forms of therapy  Other therapy methods may work better for you than CBT. Or, you may move from CBT to another form of therapy as your treatment needs change. This may mean meeting with a therapist by yourself or in a group. Therapy can also help you work through problems in your life, such as drug or alcohol dependence, that may be making your anxiety worse.  Getting better takes time  Therapy will help you feel better and teach you skills to help manage anxiety long term. But change doesn t happen right away. It  takes a commitment from you. And treatment only works if you learn to face the causes of your anxiety. So, you might feel worse before you feel better. This can sometimes make it hard to stick with it. But remember: Therapy is a very effective treatment. The results will be well worth it.  Helping yourself  If anxiety is wearing you down, here are some things you can do to cope:    Check with your doctor and rule out any physical problems that may be causing the anxiety symptoms.    If an anxiety disorder is diagnosed, seek mental healthcare. This is an illness and it can respond to treatment. Most types of anxiety disorders will respond to talk therapy and medicine.    Educate yourself about anxiety disorders. Keep track of helpful online resources and books you can use during stressful periods.    Try stress management techniques such as meditation.    Consider online or in-person support groups.    Don t fight your feelings. Anxiety feeds itself. The more you worry about it, the worse it gets. Instead, try to identify what might have triggered your anxiety. Then try to put this threat in perspective.    Keep in mind that you can t control everything about a situation. Change what you can and let the rest take its course.    Exercise -- it s a great way to relieve tension and help your body feel relaxed.    Examine your life for stress, and try to find ways to reduce it.    Avoid caffeine and nicotine, which can make anxiety symptoms worse.    Fight the temptation to turn to alcohol or unprescribed drugs for relief. They only make things worse in the long run.   Date Last Reviewed: 1/1/2017 2000-2017 [employer]. [address] [place] [address], [place] [address]. All rights reserved. This information is not intended as a substitute for professional medical care. Always follow your healthcare professional's instructions.        Depression: Tips to Help Yourself  As your healthcare providers help treat your  depression, you can also help yourself. Keep in mind that your illness affects you emotionally, physically, mentally, and socially. So full recovery will take time. Take care of your body and your soul, and be patient with yourself as you get better.    Self-care    Educate yourself. Read about treatment and medicine options. If you have the energy, attend local conferences or support groups. Keep a list of useful websites and helpful books and use them as needed. This illness is not your fault. Don t blame yourself for your depression.    Manage early symptoms. If you notice symptoms returning, experience triggers, or identify other factors that may lead to a depressive episode, get help as soon as possible. Ask trusted friends and family to monitor your behavior and let you know if they see anything of concern.    Work with your provider. Find a provider you can trust. Communicate honestly with that person and share information on your treatment for depression and your reaction to medicines.    Be prepared for a crisis. Know what to do if you experience a crisis. Keep the phone number of a crisis hotline and know the location of your community's urgent care centers and the closest emergency department.    Hold off on big decisions. Depression can cloud your judgment. So wait until you feel better before making major life decisions, such as changing jobs, moving, or getting  or .    Be patient. Recovering from depression is a process. Don t be discouraged if it takes some time to feel better.    Keep it simple. Depression saps your energy and concentration. So you won t be able to do all the things you used to do. Set small goals and do what you can.    Be with others. Don t isolate yourself--you ll only feel worse. Try to be with other people. And take part in fun activities when you can. Go to a movie, ballgame, [employer] service, or social event. Talk openly with people you can trust. And accept  help when it s offered.  Take care of your body  People with depression often lose the desire to take care of themselves. That only makes their problems worse. During treatment and afterward, make a point to:    Exercise. It s a great way to take care of your body. And studies have shown that exercise helps fight depression.    Avoid drugs and alcohol. These may ease the pain in the short term. But they ll only make your problems worse in the long run.    Get relief from stress. Ask your healthcare provider for relaxation exercises and techniques to help relieve stress.    Eat right. A balanced and healthy diet helps keep your body healthy.  Date Last Reviewed: 1/1/2017 2000-2017 [employer]. [address] [place] [address], [place] [address]. All rights reserved. This information is not intended as a substitute for professional medical care. Always follow your healthcare professional's instructions.

## 2017-07-21 ASSESSMENT — PATIENT HEALTH QUESTIONNAIRE - PHQ9: SUM OF ALL RESPONSES TO PHQ QUESTIONS 1-9: 14

## 2017-07-21 ASSESSMENT — ANXIETY QUESTIONNAIRES: GAD7 TOTAL SCORE: 12

## 2017-09-08 DIAGNOSIS — E11.9 TYPE 2 DIABETES, HBA1C GOAL < 7% (H): Primary | ICD-10-CM

## 2017-09-08 NOTE — TELEPHONE ENCOUNTER
One touch glucose meter      Last Written Prescription Date: 1/30/15  Last Fill Quantity: 1,  # refills: 0   Last Office Visit with G, P or [facility] prescribing provider: 7/20/17

## 2017-12-22 DIAGNOSIS — E11.9 TYPE 2 DIABETES MELLITUS WITHOUT COMPLICATION, WITHOUT LONG-TERM CURRENT USE OF INSULIN (H): ICD-10-CM

## 2017-12-22 RX ORDER — LANCETS
EACH MISCELLANEOUS
Qty: 1 EACH | Refills: 11 | Status: SHIPPED | OUTPATIENT
Start: 2017-12-22 | End: 2019-01-02

## 2017-12-22 NOTE — TELEPHONE ENCOUNTER
Last Written Prescription Date:  4-21-17  Last Fill Quantity: 1 box,  # refills: 3   Last Office Visit with [facility], [facility] or  Health prescribing provider:  4-21-17   Future Office Visit:    Next 5 appointments (look out 90 days)     Jan 04, 2018 10:00 AM CST   [facility]C   [facility]    [address] [facility] [address] [phone]                 Requested Prescriptions   Pending Prescriptions Disp Refills     blood glucose monitoring (ONE TOUCH ULTRASOFT) lancets 1 each 11     Sig: test 1 times daily    There is no refill protocol information for this order

## 2017-12-29 NOTE — PROGRESS NOTES
SUBJECTIVE:                                                    Roque Mckeon is a 82 year old male who presents to clinic today for the following health issues:    History of Present Illness     Depression & Anxiety Follow-up:     Depression/Anxiety:  Depression only    Status since last visit::  Improved    Other associated symptoms of depression::  None    Significant life event::  No    Current substance use::  None    Anxiety/Panic symptoms::  No    Hyperlipidemia:     Low fat/chol diet rating::  Good    Taking Statins::  YES    Side effects from hypolipidemia medication::  No muscle aches from Statin    Lipid Medications or Supplements::  None    Diet:  Diabetic  Frequency of exercise:  1 day/week  Duration of exercise:  30-45 minutes  Taking medications regularly:  Yes  Medication side effects:  None  Additional concerns today:  No    Problem list and histories reviewed & adjusted, as indicated.  Additional history: as documented    Patient Active Problem List   Diagnosis     Other left bundle branch block     HYPERLIPIDEMIA LDL GOAL <100     Major depression in complete remission (H)     Advanced directives, counseling/discussion     Encounter for long-term current use of medication     Gout     Type 2 diabetes mellitus without complication, without long-term current use of insulin (H)     Tick-borne disease     Past Surgical History:   Procedure Laterality Date     COLONOSCOPY  8/3/2011    Procedure:COMBINED COLONOSCOPY, SINGLE BIOPSY/POLYPECTOMY BY BIOPSY; Surgeon:GRUPO STONER; Location: GI     COLONOSCOPY  2016    [facility] COLONOSCOPY W/WO BRUSH/WASH  02/27/2001    Normal.     HC FRAGMENTING OF KIDNEY STONE  03/05/2003    Left extracorporal shock wave lithotripsy, [facility]     PHACOEMULSIFICATION WITH STANDARD INTRAOCULAR LENS IMPLANT  1/20/2011    PHACOEMULSIFICATION WITH STANDARD INTRAOCULAR LENS IMPLANT performed by OSMAN CHO at [facility] OR       Social History   Substance Use  Topics     Smoking status: Never Smoker     Smokeless tobacco: Never Used      Comment: never     Alcohol use No     Family History   Problem Relation Age of Onset     Cardiovascular Mother      Arthritis Mother      HEART DISEASE Mother      CEREBROVASCULAR DISEASE Mother      Cardiovascular Father      HEART DISEASE Father      Depression Son      Depression Sister      Genetic Disorder Son      OSTEOPOROSIS Sister      Psychotic Disorder Son      Breast Cancer Sister      Alcohol/Drug No family hx of      Circulatory No family hx of      Allergies No family hx of      Alzheimer Disease No family hx of      Blood Disease No family hx of      CANCER No family hx of      DIABETES No family hx of      GASTROINTESTINAL DISEASE No family hx of      Genitourinary Problems No family hx of      Lipids No family hx of      Neurologic Disorder No family hx of      Thyroid Disease No family hx of          Current Outpatient Prescriptions   Medication Sig Dispense Refill     PARoxetine (PAXIL) 30 MG tablet Take 1 tablet (30 mg) by mouth At Bedtime 90 tablet 1     blood glucose monitoring (ONE TOUCH ULTRASOFT) lancets test 1 times daily 1 each 11     blood glucose monitoring (NO BRAND SPECIFIED) meter device kit Use to test blood sugar 1 times daily or as directed. 1 kit 0     triamcinolone (KENALOG) 0.1 % cream Apply sparingly to affected area three times daily for 14 days. 30 g 0     blood glucose monitoring (ONE TOUCH ULTRA) test strip Test once daily or as directed 100 strip 3     metFORMIN (GLUCOPHAGE) 1000 MG tablet Take 1 tablet (1,000 mg) by mouth daily (with dinner) 90 tablet 3     simvastatin (ZOCOR) 80 MG tablet Take 0.5 tablets (40 mg) by mouth daily 90 tablet 3     Blood Glucose Monitoring Suppl MISC 1 strip daily Or as directed. 100 each 0     Multiple Vitamins-Minerals (MULTIVITAMIN & MINERAL PO) Take 1 tablet by mouth daily       potassium citrate (UROCIT-K 10) 10 MEQ (1080 MG) SR tablet Take 1 tablet by mouth  "3 times daily (with meals). 270 tablet 3     allopurinol (ZYLOPRIM) 300 MG tablet Take 1 tablet by mouth daily. 90 tablet 3     ACE NOT PRESCRIBED, INTENTIONAL, by Other route continuous prn.  0     FISH OIL 1000 MG OR CAPS 1 capsule 2 times per day  0     ASPIRIN 81 MG OR TABS 1 tab po QD (Once per day) 0 0     [DISCONTINUED] PARoxetine (PAXIL) 30 MG tablet Take 1 tablet (30 mg) by mouth At Bedtime 90 tablet 1     [DISCONTINUED] PARoxetine (PAXIL) 20 MG tablet Take 1 tablet (20 mg) by mouth daily (Patient not taking: Reported on 1/4/2018) 90 tablet 3     Allergies   Allergen Reactions     No Known Drug Allergies      Recent Labs   Lab Test  01/04/18   0953 05/31/17 09/16/16 03/16/16   1133  03/25/15   1004  04/04/14   1448  09/18/13   0749   11/09/11   0927   A1C  7.7*  7.7*  7.8*  7.7*  7.8*  7.5*  6.9*   < >   --    LDL   --    --    --   72  61   --   79   < >   --    HDL   --   53   --   53  52   --   46   < >   --    TRIG   --   127   --   173*  93   --   141   < >   --    ALT   --   38   --   32  32   --   39   < >   --    CR   --   1.0   --   0.98  0.88  0.93   --    < >  0.88   GFRESTIMATED   --   >60   --   74  84  79   --    < >  85   GFRESTBLACK   --    --    --   89  >90   GFR Calc    >90   --    < >  >90   POTASSIUM   --   4.9   --   4.6  4.3   --    --    --    --    TSH   --    --    --    --    --   2.24   --    --   2.62    < > = values in this interval not displayed.      BP Readings from Last 3 Encounters:   01/04/18 124/70   07/20/17 126/78   07/06/17 138/70    Wt Readings from Last 3 Encounters:   01/04/18 184 lb (83.5 kg)   07/20/17 168 lb 12.8 oz (76.6 kg)   07/06/17 170 lb 12.8 oz (77.5 kg)                  Labs reviewed in EPIC          ROS:  Constitutional, HEENT, cardiovascular, pulmonary, gi and gu systems are negative, except as otherwise noted.      OBJECTIVE:   /70 (Cuff Size: Adult Regular)  Pulse 84  Temp 97.6  F (36.4  C) (Temporal)  Resp 16  Ht 5' 11\" " "(1.803 m)  Wt 184 lb (83.5 kg)  BMI 25.66 kg/m2  Body mass index is 25.66 kg/(m^2).  GENERAL: healthy, alert and no distress  NECK: no adenopathy, no asymmetry, masses, or scars and thyroid normal to palpation  RESP: lungs clear to auscultation - no rales, rhonchi or wheezes  CV: regular rate and rhythm, normal S1 S2, no S3 or S4, no murmur, click or rub, no peripheral edema and peripheral pulses strong  ABDOMEN: soft, nontender, no hepatosplenomegaly, no masses and bowel sounds normal  MS: no gross musculoskeletal defects noted, no edema  PSYCH: mentation appears normal, affect normal/bright  Diabetic foot exam: normal DP and PT pulses, no trophic changes or ulcerative lesions and normal sensory exam    Diagnostic Test Results:  Results for orders placed or performed in visit on 01/04/18   Hemoglobin A1c   Result Value Ref Range    Hemoglobin A1C 7.7 (H) 4.3 - 6.0 %   CBC with platelets   Result Value Ref Range    WBC 4.6 4.0 - 11.0 10e9/L    RBC Count 4.00 (L) 4.4 - 5.9 10e12/L    Hemoglobin 12.7 (L) 13.3 - 17.7 g/dL    Hematocrit 38.0 (L) 40.0 - 53.0 %    MCV 95 78 - 100 fl    MCH 31.8 26.5 - 33.0 pg    MCHC 33.4 31.5 - 36.5 g/dL    RDW 12.8 10.0 - 15.0 %    Platelet Count 222 150 - 450 10e9/L       ASSESSMENT/PLAN:     BMI:   Estimated body mass index is 25.66 kg/(m^2) as calculated from the following:    Height as of this encounter: 5' 11\" (1.803 m).    Weight as of this encounter: 184 lb (83.5 kg).   Weight management plan: Discussed healthy diet and exercise guidelines and patient will follow up in 6 months in clinic to re-evaluate.        1. Hyperlipidemia LDL goal <100  Results pending  - HONORING CHOICES REFERRAL  - Comprehensive metabolic panel  - Hemoglobin A1c  - Albumin Random Urine Quantitative with Creat Ratio  - TSH with free T4 reflex  - CBC with platelets    2. Type 2 diabetes mellitus without complication, without long-term current use of insulin (H)  Fair control.  No changes in meds.  Recheck " in 6 months.  - CBC with platelets  - FOOT EXAM  - TDAP VACCINE (ADACEL)  - Pneumococcal vaccine 13 valent PCV13 IM (Prevnar) [64722]    3. Major depression in complete remission (H)  Continue meds.  - CBC with platelets    4. Tick-borne disease  - CBC with platelets    5. Anxiety and depression  Continue meds and observe.  - PARoxetine (PAXIL) 30 MG tablet; Take 1 tablet (30 mg) by mouth At Bedtime  Dispense: 90 tablet; Refill: 1    ROV 6 months.    Adam Carvajal PA-C  [facility]  Answers for HPI/ROS submitted by the patient on 1/4/2018   PHQ-2 Score: 0  If you checked off any problems, how difficult have these problems made it for you to do your work, take care of things at home, or get along with other people?: Not difficult at all  PHQ9 TOTAL SCORE: 2  CHERI 7 TOTAL SCORE: 2

## 2018-01-04 ENCOUNTER — TELEPHONE (OUTPATIENT)
Dept: FAMILY MEDICINE | Facility: OTHER | Age: 83
End: 2018-01-04

## 2018-01-04 ENCOUNTER — OFFICE VISIT (OUTPATIENT)
Dept: FAMILY MEDICINE | Facility: OTHER | Age: 83
End: 2018-01-04
Payer: COMMERCIAL

## 2018-01-04 VITALS
HEIGHT: 71 IN | BODY MASS INDEX: 25.76 KG/M2 | DIASTOLIC BLOOD PRESSURE: 70 MMHG | TEMPERATURE: 97.6 F | RESPIRATION RATE: 16 BRPM | HEART RATE: 84 BPM | SYSTOLIC BLOOD PRESSURE: 124 MMHG | WEIGHT: 184 LBS

## 2018-01-04 DIAGNOSIS — F32.5 MAJOR DEPRESSION IN COMPLETE REMISSION (H): ICD-10-CM

## 2018-01-04 DIAGNOSIS — F32.A ANXIETY AND DEPRESSION: ICD-10-CM

## 2018-01-04 DIAGNOSIS — E11.9 TYPE 2 DIABETES MELLITUS WITHOUT COMPLICATION, WITHOUT LONG-TERM CURRENT USE OF INSULIN (H): ICD-10-CM

## 2018-01-04 DIAGNOSIS — F41.9 ANXIETY AND DEPRESSION: ICD-10-CM

## 2018-01-04 DIAGNOSIS — E78.5 HYPERLIPIDEMIA LDL GOAL <100: Primary | ICD-10-CM

## 2018-01-04 DIAGNOSIS — B88.2 TICK-BORNE DISEASE: ICD-10-CM

## 2018-01-04 LAB
ALBUMIN SERPL-MCNC: 4 G/DL (ref 3.4–5)
ALP SERPL-CCNC: 62 U/L (ref 40–150)
ALT SERPL W P-5'-P-CCNC: 31 U/L (ref 0–70)
ANION GAP SERPL CALCULATED.3IONS-SCNC: 4 MMOL/L (ref 3–14)
AST SERPL W P-5'-P-CCNC: 24 U/L (ref 0–45)
BILIRUB SERPL-MCNC: 0.4 MG/DL (ref 0.2–1.3)
BUN SERPL-MCNC: 21 MG/DL (ref 7–30)
CALCIUM SERPL-MCNC: 8.9 MG/DL (ref 8.5–10.1)
CHLORIDE SERPL-SCNC: 103 MMOL/L (ref 94–109)
CO2 SERPL-SCNC: 32 MMOL/L (ref 20–32)
CREAT SERPL-MCNC: 0.87 MG/DL (ref 0.66–1.25)
CREAT UR-MCNC: 112 MG/DL
ERYTHROCYTE [DISTWIDTH] IN BLOOD BY AUTOMATED COUNT: 12.8 % (ref 10–15)
GFR SERPL CREATININE-BSD FRML MDRD: 84 ML/MIN/1.7M2
GLUCOSE SERPL-MCNC: 166 MG/DL (ref 70–99)
HBA1C MFR BLD: 7.7 % (ref 4.3–6)
HCT VFR BLD AUTO: 38 % (ref 40–53)
HGB BLD-MCNC: 12.7 G/DL (ref 13.3–17.7)
MCH RBC QN AUTO: 31.8 PG (ref 26.5–33)
MCHC RBC AUTO-ENTMCNC: 33.4 G/DL (ref 31.5–36.5)
MCV RBC AUTO: 95 FL (ref 78–100)
MICROALBUMIN UR-MCNC: 34 MG/L
MICROALBUMIN/CREAT UR: 30.8 MG/G CR (ref 0–17)
PLATELET # BLD AUTO: 222 10E9/L (ref 150–450)
POTASSIUM SERPL-SCNC: 4.5 MMOL/L (ref 3.4–5.3)
PROT SERPL-MCNC: 7.6 G/DL (ref 6.8–8.8)
RBC # BLD AUTO: 4 10E12/L (ref 4.4–5.9)
SODIUM SERPL-SCNC: 139 MMOL/L (ref 133–144)
TSH SERPL DL<=0.005 MIU/L-ACNC: 2.65 MU/L (ref 0.4–4)
WBC # BLD AUTO: 4.6 10E9/L (ref 4–11)

## 2018-01-04 PROCEDURE — 36415 COLL VENOUS BLD VENIPUNCTURE: CPT | Performed by: PHYSICIAN ASSISTANT

## 2018-01-04 PROCEDURE — 99207 C FOOT EXAM  NO CHARGE: CPT | Performed by: PHYSICIAN ASSISTANT

## 2018-01-04 PROCEDURE — 82043 UR ALBUMIN QUANTITATIVE: CPT | Performed by: PHYSICIAN ASSISTANT

## 2018-01-04 PROCEDURE — 99214 OFFICE O/P EST MOD 30 MIN: CPT | Mod: 25 | Performed by: PHYSICIAN ASSISTANT

## 2018-01-04 PROCEDURE — 84443 ASSAY THYROID STIM HORMONE: CPT | Performed by: PHYSICIAN ASSISTANT

## 2018-01-04 PROCEDURE — 80053 COMPREHEN METABOLIC PANEL: CPT | Performed by: PHYSICIAN ASSISTANT

## 2018-01-04 PROCEDURE — 85027 COMPLETE CBC AUTOMATED: CPT | Performed by: PHYSICIAN ASSISTANT

## 2018-01-04 PROCEDURE — 90715 TDAP VACCINE 7 YRS/> IM: CPT | Performed by: PHYSICIAN ASSISTANT

## 2018-01-04 PROCEDURE — 83036 HEMOGLOBIN GLYCOSYLATED A1C: CPT | Performed by: PHYSICIAN ASSISTANT

## 2018-01-04 PROCEDURE — 90471 IMMUNIZATION ADMIN: CPT | Performed by: PHYSICIAN ASSISTANT

## 2018-01-04 RX ORDER — PAROXETINE 30 MG/1
30 TABLET, FILM COATED ORAL AT BEDTIME
Qty: 90 TABLET | Refills: 1 | Status: SHIPPED | OUTPATIENT
Start: 2018-01-04 | End: 2018-07-16

## 2018-01-04 ASSESSMENT — PATIENT HEALTH QUESTIONNAIRE - PHQ9
SUM OF ALL RESPONSES TO PHQ QUESTIONS 1-9: 2
10. IF YOU CHECKED OFF ANY PROBLEMS, HOW DIFFICULT HAVE THESE PROBLEMS MADE IT FOR YOU TO DO YOUR WORK, TAKE CARE OF THINGS AT HOME, OR GET ALONG WITH OTHER PEOPLE: NOT DIFFICULT AT ALL
SUM OF ALL RESPONSES TO PHQ QUESTIONS 1-9: 2

## 2018-01-04 ASSESSMENT — ANXIETY QUESTIONNAIRES
1. FEELING NERVOUS, ANXIOUS, OR ON EDGE: NOT AT ALL
6. BECOMING EASILY ANNOYED OR IRRITABLE: NOT AT ALL
GAD7 TOTAL SCORE: 2
5. BEING SO RESTLESS THAT IT IS HARD TO SIT STILL: NOT AT ALL
4. TROUBLE RELAXING: MORE THAN HALF THE DAYS
GAD7 TOTAL SCORE: 2
2. NOT BEING ABLE TO STOP OR CONTROL WORRYING: NOT AT ALL
GAD7 TOTAL SCORE: 2
7. FEELING AFRAID AS IF SOMETHING AWFUL MIGHT HAPPEN: NOT AT ALL
7. FEELING AFRAID AS IF SOMETHING AWFUL MIGHT HAPPEN: NOT AT ALL
3. WORRYING TOO MUCH ABOUT DIFFERENT THINGS: NOT AT ALL

## 2018-01-04 ASSESSMENT — PAIN SCALES - GENERAL: PAINLEVEL: NO PAIN (0)

## 2018-01-04 NOTE — MR AVS SNAPSHOT
"              After Visit Summary   1/4/2018    Roque Mckeon    MRN: [id number]           Patient Information     Date Of Birth          1935        Visit Information        Provider Department      1/4/2018 10:00 AM Adam Marc PA-C [facility]        Today's Diagnoses     Hyperlipidemia LDL goal <100    -  1    Type 2 diabetes mellitus without complication, without long-term current use of insulin (H)        Major depression in complete remission (H)        Tick-borne disease        Anxiety and depression           Follow-ups after your visit        Additional Services     HONORING [facility] REFERRAL       Your provider has referred you to [facility] Advance Care Planning Facilitator or Serious illness clinic support staff. The facilitator or support staff will contact you to schedule the appointment or for the follow up call    Reason for Referral: Basic Advance Care Planning - 1:1 need                  Who to contact     If you have questions or need follow up information about today's clinic visit or your schedule please contact [facility] directly at [phone].  Normal or non-critical lab and imaging results will be communicated to you by Casabuhart, letter or phone within 4 business days after the clinic has received the results. If you do not hear from us within 7 days, please contact the clinic through Casabuhart or phone. If you have a critical or abnormal lab result, we will notify you by phone as soon as possible.  Submit refill requests through Zooomr or call your pharmacy and they will forward the refill request to us. Please allow 3 business days for your refill to be completed.          Additional Information About Your Visit        Casabuhart Information     Zooomr lets you send messages to your doctor, view your test results, renew your prescriptions, schedule appointments and more. To sign up, go to [email or web address] . Click on \"Log in\" " "on the left side of the screen, which will take you to the Welcome page. Then click on \"Sign up Now\" on the right side of the page.     You will be asked to enter the access code listed below, as well as some personal information. Please follow the directions to create your username and password.     Your access code is: [email or web address]  Expires: 2018 10:32 AM     Your access code will  in 90 days. If you need help or a new code, please call your [facility] or [phone].        Care EveryWhere ID     This is your Care EveryWhere ID. This could be used by other organizations to access your [facility] medical records  [id number]        Your Vitals Were     Pulse Temperature Respirations Height BMI (Body Mass Index)       84 97.6  F (36.4  C) (Temporal) 16 5' 11\" (1.803 m) 25.66 kg/m2        Blood Pressure from Last 3 Encounters:   18 124/70   17 126/78   17 138/70    Weight from Last 3 Encounters:   18 184 lb (83.5 kg)   17 168 lb 12.8 oz (76.6 kg)   17 170 lb 12.8 oz (77.5 kg)              We Performed the Following     Albumin Random Urine Quantitative with Creat Ratio     CBC with platelets     Comprehensive metabolic panel     FOOT EXAM     Hemoglobin A1c     HONORING CHOICES REFERRAL     Pneumococcal vaccine 13 valent PCV13 IM (Prevnar) [54165]     TDAP VACCINE (ADACEL)     TSH with free T4 reflex          Today's Medication Changes          These changes are accurate as of: 18 10:32 AM.  If you have any questions, ask your nurse or doctor.               These medicines have changed or have updated prescriptions.        Dose/Directions    PARoxetine 30 MG tablet   Commonly known as:  PAXIL   This may have changed:  Another medication with the same name was removed. Continue taking this medication, and follow the directions you see here.   Used for:  Anxiety and depression   Changed by:  Adam Marc PA-C        Dose:  30 mg   Take 1 tablet (30 mg) by mouth " At Bedtime   Quantity:  90 tablet   Refills:  1            Where to get your medicines      These medications were sent to [id number] - [place], [place] - [address]  [address], [facility] [place] [address]    Hours:  Formerly [place] - numbers unchanged   9/8/03  Phone:  [phone]     PARoxetine 30 MG tablet                Primary Care Provider Office Phone # Fax #    Adam Marc PA-C [phone]       [facility] [address] [address] [facility] [address]        Equal Access to Services     [facility]: Hadii aad ku hadasho Soomaali, waaxda luqadaha, qaybta kaalmada adeegyada, waxay idiin hayaan adeolivia lópez . So [facility] [phone].    ATENCIÓN: Si habla español, tiene a appiah disposición servicios gratuitos de asistencia lingüística. Llame al [phone].    We comply with applicable federal civil rights laws and [place] laws. We do not discriminate on the basis of race, color, national origin, age, disability, sex, sexual orientation, or gender identity.            Thank you!     Thank you for choosing [facility]  for your care. Our goal is always to provide you with excellent care. Hearing back from our patients is one way we can continue to improve our services. Please take a few minutes to complete the written survey that you may receive in the mail after your visit with us. Thank you!             Your Updated Medication List - Protect others around you: Learn how to safely use, store and throw away your medicines at www.disposemymeds.org.          This list is accurate as of: 1/4/18 10:32 AM.  Always use your most recent med list.                   Brand Name Dispense Instructions for use Diagnosis    ACE NOT PRESCRIBED (INTENTIONAL)      by Other route continuous prn.        allopurinol 300 MG tablet    ZYLOPRIM    90 tablet    Take 1 tablet by mouth daily.    Calculus of kidney       aspirin 81 MG tablet     0    1 tab po QD (Once per day)    Type II or  unspecified type diabetes mellitus without mention of complication, not stated as uncontrolled       blood glucose monitoring lancets     1 each    test 1 times daily    Type 2 diabetes mellitus without complication, without long-term current use of insulin (H)       * Blood Glucose Monitoring Suppl Misc     100 each    1 strip daily Or as directed.    Type 2 diabetes, HbA1c goal < 7% (H)       * blood glucose monitoring meter device kit    no brand specified    1 kit    Use to test blood sugar 1 times daily or as directed.    Type 2 diabetes, HbA1c goal < 7% (H)       blood glucose monitoring test strip    ONETOUCH ULTRA    100 strip    Test once daily or as directed    Type 2 diabetes mellitus without complication, without long-term current use of insulin (H)       fish oil-omega-3 fatty acids 1000 MG capsule      1 capsule 2 times per day    Type II or unspecified type diabetes mellitus without mention of complication, not stated as uncontrolled       metFORMIN 1000 MG tablet    GLUCOPHAGE    90 tablet    Take 1 tablet (1,000 mg) by mouth daily (with dinner)        MULTIVITAMIN & MINERAL PO      Take 1 tablet by mouth daily        PARoxetine 30 MG tablet    PAXIL    90 tablet    Take 1 tablet (30 mg) by mouth At Bedtime    Anxiety and depression       potassium citrate 10 MEQ (1080 MG) CR tablet    UROCIT-K    270 tablet    Take 1 tablet by mouth 3 times daily (with meals).    Calculus of kidney       triamcinolone 0.1 % cream    KENALOG    30 g    Apply sparingly to affected area three times daily for 14 days.    Bug bites, initial encounter, Rash and nonspecific skin eruption       ZOCOR 80 MG tablet   Generic drug:  simvastatin     90 tablet    Take 0.5 tablets (40 mg) by mouth daily    Hyperlipidemia LDL goal <100       * Notice:  This list has 2 medication(s) that are the same as other medications prescribed for you. Read the directions carefully, and ask your doctor or other care provider to review them  with you.

## 2018-01-04 NOTE — TELEPHONE ENCOUNTER
Letter sent  Melvi Cespedes CMA ([facility])    Notes Recorded by Adam Marc PA-C on 1/4/2018 at 3:48 PM  Very stable labs.  No new concerns related to medications.  No changes in medications.  I Would encourage increased exercise and dietary changes as discussed at our clinic visit today.  Electronically signed:    Adam Marc PA-C

## 2018-01-04 NOTE — LETTER
January 4, 2018      Roque Mckeon  [address] [address] [place], [place] [address]   [facility] [address]      Dear ,    We are writing to inform you of your test results.    Very stable labs.  No new concerns related to medications.  No changes in medications.  I Would encourage increased exercise and dietary changes as discussed at our clinic visit today.  Electronically signed:      Results for orders placed or performed in visit on 01/04/18   Comprehensive metabolic panel   Result Value Ref Range    Sodium 139 133 - 144 mmol/L    Potassium 4.5 3.4 - 5.3 mmol/L    Chloride 103 94 - 109 mmol/L    Carbon Dioxide 32 20 - 32 mmol/L    Anion Gap 4 3 - 14 mmol/L    Glucose 166 (H) 70 - 99 mg/dL    Urea Nitrogen 21 7 - 30 mg/dL    Creatinine 0.87 0.66 - 1.25 mg/dL    GFR Estimate 84 >60 mL/min/1.7m2    GFR Estimate If Black >90 >60 mL/min/1.7m2    Calcium 8.9 8.5 - 10.1 mg/dL    Bilirubin Total 0.4 0.2 - 1.3 mg/dL    Albumin 4.0 3.4 - 5.0 g/dL    Protein Total 7.6 6.8 - 8.8 g/dL    Alkaline Phosphatase 62 40 - 150 U/L    ALT 31 0 - 70 U/L    AST 24 0 - 45 U/L   Hemoglobin A1c   Result Value Ref Range    Hemoglobin A1C 7.7 (H) 4.3 - 6.0 %   Albumin Random Urine Quantitative with Creat Ratio   Result Value Ref Range    Creatinine Urine 112 mg/dL    Albumin Urine mg/L 34 mg/L    Albumin Urine mg/g Cr 30.80 (H) 0 - 17 mg/g Cr   TSH with free T4 reflex   Result Value Ref Range    TSH 2.65 0.40 - 4.00 mU/L   CBC with platelets   Result Value Ref Range    WBC 4.6 4.0 - 11.0 10e9/L    RBC Count 4.00 (L) 4.4 - 5.9 10e12/L    Hemoglobin 12.7 (L) 13.3 - 17.7 g/dL    Hematocrit 38.0 (L) 40.0 - 53.0 %    MCV 95 78 - 100 fl    MCH 31.8 26.5 - 33.0 pg    MCHC 33.4 31.5 - 36.5 g/dL    RDW 12.8 10.0 - 15.0 %    Platelet Count 222 150 - 450 10e9/L       If you have any questions or concerns, please call the clinic at the number listed above.       Sincerely,        Adam Carvajal PA-C

## 2018-01-04 NOTE — NURSING NOTE
Prior to injection verified patient identity using patient's name and date of birth.  Screening Questionnaire for Adult Immunization    Are you sick today?   Yes   Do you have allergies to medications, food, a vaccine component or latex?   No   Have you ever had a serious reaction after receiving a vaccination?   No   Do you have a long-term health problem with heart disease, lung disease, asthma, kidney disease, metabolic disease (e.g. diabetes), anemia, or other blood disorder?   Yes   Do you have cancer, leukemia, HIV/AIDS, or any other immune system problem?   No   In the past 3 months, have you taken medications that affect  your immune system, such as prednisone, other steroids, or anticancer drugs; drugs for the treatment of rheumatoid arthritis, Crohn s disease, or psoriasis; or have you had radiation treatments?   No   Have you had a seizure, or a brain or other nervous system problem?   No   During the past year, have you received a transfusion of blood or blood     products, or been given immune (gamma) globulin or antiviral drug?   No   For women: Are you pregnant or is there a chance you could become        pregnant during the next month?   No   Have you received any vaccinations in the past 4 weeks?   No     Immunization questionnaire was positive for at least one answer.  Notified provider ok to give.        Per orders of Adam Marc, injection of tdap given by Luna Adamson. Patient instructed to remain in clinic for 15 minutes afterwards, and to report any adverse reaction to me immediately.       Screening performed by Luna Adamson on 1/4/2018 at 11:02 AM.

## 2018-01-05 ASSESSMENT — PATIENT HEALTH QUESTIONNAIRE - PHQ9: SUM OF ALL RESPONSES TO PHQ QUESTIONS 1-9: 2

## 2018-01-05 ASSESSMENT — ANXIETY QUESTIONNAIRES: GAD7 TOTAL SCORE: 2

## 2018-01-08 PROBLEM — F43.23 ADJUSTMENT DISORDER WITH MIXED ANXIETY AND DEPRESSED MOOD: Status: ACTIVE | Noted: 2018-01-08

## 2018-02-02 DIAGNOSIS — E11.9 TYPE 2 DIABETES MELLITUS WITHOUT COMPLICATION, WITHOUT LONG-TERM CURRENT USE OF INSULIN (H): ICD-10-CM

## 2018-02-02 NOTE — TELEPHONE ENCOUNTER
Requested Prescriptions   Pending Prescriptions Disp Refills     blood glucose monitoring (ONETOUCH ULTRA) test strip 100 strip 3     Sig: Test blood sugar 3 times daily    Diabetic Supplies Protocol Passed    2/2/2018  1:46 PM       Passed - Patient is 18 years of age or older       Passed - Patient has had appt within past 6 mos    IV to PO - Antibiotics     None                      Last Written Prescription Date:  4/21/17  Last Fill Quantity: 100 strip,  # refills: 3   Last Office Visit with G, P or [facility] prescribing provider:  1/4/18   Future Office Visit:

## 2018-02-02 NOTE — TELEPHONE ENCOUNTER
Prescription approved per [facility] Refill Protocol.    Isabelle Paredes, RN, BSN

## 2018-06-07 ENCOUNTER — TRANSFERRED RECORDS (OUTPATIENT)
Dept: HEALTH INFORMATION MANAGEMENT | Facility: CLINIC | Age: 83
End: 2018-06-07

## 2018-07-26 NOTE — PROGRESS NOTES
SUBJECTIVE:   Roque Mckeon is a 82 year old male who presents to clinic today for the following health issues:  [facility]     History of Present Illness     Depression & Anxiety Follow-up:     Depression/Anxiety:  Depression only    Status since last visit::  Stable    Other associated symptoms of depression::  None    Significant life event::  No    Current substance use::  Prescription Drugs    Anxiety/Panic symptoms::  No       Today's PHQ-9         PHQ-9 Total Score:     (P) 0   PHQ-9 Q9 Suicidal ideation:   (P) Not at all   Thoughts of suicide or self harm:      Self-harm Plan:        Self-harm Action:          Safety concerns for self or others:       CHERI-7 Total Score: (P) 0    Diet:  Diabetic  Frequency of exercise:  2-3 days/week  Duration of exercise:  Greater than 60 minutes  Taking medications regularly:  Yes  Medication side effects:  None  Additional concerns today:  No    Patient was unable to give blood due to LOW Hemoglobin     Problem list and histories reviewed & adjusted, as indicated.  Additional history: as documented    Patient Active Problem List   Diagnosis     Other left bundle branch block     HYPERLIPIDEMIA LDL GOAL <100     Major depression in complete remission (H)     Advanced directives, counseling/discussion     Encounter for long-term current use of medication     Gout     Type 2 diabetes mellitus without complication, without long-term current use of insulin (H)     Tick-borne disease     Adjustment disorder with mixed anxiety and depressed mood     Past Surgical History:   Procedure Laterality Date     COLONOSCOPY  8/3/2011    Procedure:COMBINED COLONOSCOPY, SINGLE BIOPSY/POLYPECTOMY BY BIOPSY; Surgeon:GRUPO STONER; Location: GI     COLONOSCOPY  2016    [facility] COLONOSCOPY W/WO BRUSH/WASH  02/27/2001    Normal.     [facility] FRAGMENTING OF KIDNEY STONE  03/05/2003    Left extracorporal shock wave lithotripsy, [facility]     PHACOEMULSIFICATION WITH STANDARD INTRAOCULAR LENS IMPLANT  1/20/2011    PHACOEMULSIFICATION WITH STANDARD INTRAOCULAR LENS IMPLANT performed by OSMAN CHO at [facility] OR       Social History   Substance Use Topics     Smoking status: Never Smoker     Smokeless tobacco: Never Used      Comment: never     Alcohol use No     Family History   Problem Relation Age of Onset     Cardiovascular Mother      Arthritis Mother      HEART DISEASE Mother      Cerebrovascular Disease Mother      Cardiovascular Father      HEART DISEASE Father      Depression Son      Depression Sister      Genetic Disorder Son      Osteoperosis Sister      Psychotic Disorder Son      Breast Cancer Sister      Alcohol/Drug No family hx of      Circulatory No family hx of      Allergies No family hx of      Alzheimer Disease No family hx of      Blood Disease No family hx of      Cancer No family hx of      Diabetes No family hx of      GASTROINTESTINAL DISEASE No family hx of      Genitourinary Problems No family hx of      Lipids No family hx of      Neurologic Disorder No family hx of      Thyroid Disease No family hx of          Current Outpatient Prescriptions   Medication Sig Dispense Refill     ACE/ARB/ARNI NOT PRESCRIBED, INTENTIONAL, Please choose reason not prescribed, below       allopurinol (ZYLOPRIM) 300 MG tablet Take 1 tablet by mouth daily. 90 tablet 3     ASPIRIN 81 MG OR TABS 1 tab po QD (Once per day) 0 0     blood glucose monitoring (NO BRAND SPECIFIED) meter device kit Use to test blood sugar 1 times daily or as directed. 1 kit 0     blood glucose monitoring (ONE TOUCH ULTRASOFT) lancets test 1 times daily 1 each 11     blood glucose monitoring (ONETOUCH ULTRA) test strip Test blood sugar 3 times daily 300 strip 3     Blood Glucose Monitoring Suppl MISC 1 strip daily Or as directed. 100 each 0     FISH OIL 1000 MG OR CAPS 1 capsule 2 times per day  0     metFORMIN (GLUCOPHAGE) 1000 MG tablet Take 1 tablet (1,000 mg)  "by mouth daily (with dinner) 90 tablet 3     Multiple Vitamins-Minerals (MULTIVITAMIN & MINERAL PO) Take 1 tablet by mouth daily       PARoxetine (PAXIL) 30 MG tablet Take 1 tablet (30 mg) by mouth At Bedtime 90 tablet 1     potassium citrate (UROCIT-K 10) 10 MEQ (1080 MG) SR tablet Take 1 tablet by mouth 3 times daily (with meals). 270 tablet 3     simvastatin (ZOCOR) 80 MG tablet Take 0.5 tablets (40 mg) by mouth daily 90 tablet 3     triamcinolone (KENALOG) 0.1 % cream Apply sparingly to affected area three times daily for 14 days. 30 g 0     ACE NOT PRESCRIBED, INTENTIONAL, by Other route continuous prn.  0     [DISCONTINUED] PARoxetine (PAXIL) 30 MG tablet TAKE ONE TABLET BY MOUTH AT BEDTIME 90 tablet 0     Allergies   Allergen Reactions     No Known Drug Allergies      Recent Labs   Lab Test  01/04/18   0953 05/31/17 09/16/16 03/16/16   1133  03/25/15   1004  04/04/14   1448  09/18/13   0749   A1C  7.7*  7.7*  7.8*  7.7*  7.8*  7.5*  6.9*   LDL   --    --    --   72  61   --   79   HDL   --   53   --   53  52   --   46   TRIG   --   127   --   173*  93   --   141   ALT  31  38   --   32  32   --   39   CR  0.87  1.0   --   0.98  0.88  0.93   --    GFRESTIMATED  84  >60   --   74  84  79   --    GFRESTBLACK  >90   --    --   89  >90   GFR Calc    >90   --    POTASSIUM  4.5  4.9   --   4.6  4.3   --    --    TSH  2.65   --    --    --    --   2.24   --       BP Readings from Last 3 Encounters:   07/31/18 120/76   01/04/18 124/70   07/20/17 126/78    Wt Readings from Last 3 Encounters:   07/31/18 181 lb (82.1 kg)   01/04/18 184 lb (83.5 kg)   07/20/17 168 lb 12.8 oz (76.6 kg)                  Labs reviewed in EPIC    ROS:  Constitutional, HEENT, cardiovascular, pulmonary, gi and gu systems are negative, except as otherwise noted.    OBJECTIVE:     /76 (Cuff Size: Adult Regular)  Pulse 100  Temp 98  F (36.7  C) (Temporal)  Resp 18  Ht 5' 11\" (1.803 m)  Wt 181 lb (82.1 kg)  BMI 25.24 " kg/m2  Body mass index is 25.24 kg/(m^2).  GENERAL: healthy, alert and no distress  NECK: no adenopathy, no asymmetry, masses, or scars and thyroid normal to palpation  RESP: lungs clear to auscultation - no rales, rhonchi or wheezes  CV: regular rate and rhythm, normal S1 S2, no S3 or S4, no murmur, click or rub, no peripheral edema and peripheral pulses strong  MS: no gross musculoskeletal defects noted, no edema  NEURO: Normal strength and tone, mentation intact and speech normal  PSYCH: mentation appears normal, affect normal/bright    Diagnostic Test Results:  No results found for this or any previous visit (from the past 24 hour(s)).    ASSESSMENT/PLAN:     1. Anxiety and depression  2. Adjustment disorder with mixed anxiety and depressed mood  4. Major depression in complete remission (H)  Refilled meds.  - PARoxetine (PAXIL) 30 MG tablet; Take 1 tablet (30 mg) by mouth At Bedtime  Dispense: 90 tablet; Refill: 1    3. Hyperlipidemia LDL goal <100  [facility]  - Lipid panel reflex to direct LDL Fasting    5. Type 2 diabetes mellitus without complication, without long-term current use of insulin (H)  [facility]  - HEMOGLOBIN A1C  - ACE/ARB/ARNI NOT PRESCRIBED, INTENTIONAL,; Please choose reason not prescribed, below    6. Low hemoglobin  Will check reason behind anemia.  - CBC with platelets and differential  - Iron and iron binding capacity  - Transferrin  - Vitamin B12    ROV 3 months.    Adam Carvajal PA-C  [facility]  Answers for HPI/ROS submitted by the patient on 7/31/2018   PHQ-2 Score: 0  If you checked off any problems, how difficult have these problems made it for you to do your work, take care of things at home, or get along with other people?: Not difficult at all  PHQ9 TOTAL SCORE: 0  CHERI 7 TOTAL SCORE: 0

## 2018-07-31 ENCOUNTER — OFFICE VISIT (OUTPATIENT)
Dept: FAMILY MEDICINE | Facility: OTHER | Age: 83
End: 2018-07-31
Payer: COMMERCIAL

## 2018-07-31 VITALS
WEIGHT: 181 LBS | HEART RATE: 100 BPM | TEMPERATURE: 98 F | SYSTOLIC BLOOD PRESSURE: 120 MMHG | RESPIRATION RATE: 18 BRPM | HEIGHT: 71 IN | DIASTOLIC BLOOD PRESSURE: 76 MMHG | BODY MASS INDEX: 25.34 KG/M2

## 2018-07-31 DIAGNOSIS — E11.9 TYPE 2 DIABETES MELLITUS WITHOUT COMPLICATION, WITHOUT LONG-TERM CURRENT USE OF INSULIN (H): ICD-10-CM

## 2018-07-31 DIAGNOSIS — F32.5 MAJOR DEPRESSION IN COMPLETE REMISSION (H): ICD-10-CM

## 2018-07-31 DIAGNOSIS — D64.9 LOW HEMOGLOBIN: ICD-10-CM

## 2018-07-31 DIAGNOSIS — F32.A ANXIETY AND DEPRESSION: ICD-10-CM

## 2018-07-31 DIAGNOSIS — F41.9 ANXIETY AND DEPRESSION: ICD-10-CM

## 2018-07-31 DIAGNOSIS — E78.5 HYPERLIPIDEMIA LDL GOAL <100: ICD-10-CM

## 2018-07-31 DIAGNOSIS — F43.23 ADJUSTMENT DISORDER WITH MIXED ANXIETY AND DEPRESSED MOOD: Primary | ICD-10-CM

## 2018-07-31 LAB
BASOPHILS # BLD AUTO: 0 10E9/L (ref 0–0.2)
BASOPHILS NFR BLD AUTO: 0.1 %
DIFFERENTIAL METHOD BLD: ABNORMAL
EOSINOPHIL # BLD AUTO: 0.1 10E9/L (ref 0–0.7)
EOSINOPHIL NFR BLD AUTO: 1.2 %
ERYTHROCYTE [DISTWIDTH] IN BLOOD BY AUTOMATED COUNT: 16.2 % (ref 10–15)
HBA1C MFR BLD: 7.3 % (ref 0–5.6)
HCT VFR BLD AUTO: 39.3 % (ref 40–53)
HGB BLD-MCNC: 12.7 G/DL (ref 13.3–17.7)
IRON SATN MFR SERPL: 15 % (ref 15–46)
IRON SERPL-MCNC: 70 UG/DL (ref 35–180)
LYMPHOCYTES # BLD AUTO: 1.8 10E9/L (ref 0.8–5.3)
LYMPHOCYTES NFR BLD AUTO: 26 %
MCH RBC QN AUTO: 29.1 PG (ref 26.5–33)
MCHC RBC AUTO-ENTMCNC: 32.3 G/DL (ref 31.5–36.5)
MCV RBC AUTO: 90 FL (ref 78–100)
MONOCYTES # BLD AUTO: 0.5 10E9/L (ref 0–1.3)
MONOCYTES NFR BLD AUTO: 6.7 %
NEUTROPHILS # BLD AUTO: 4.5 10E9/L (ref 1.6–8.3)
NEUTROPHILS NFR BLD AUTO: 66 %
PLATELET # BLD AUTO: 242 10E9/L (ref 150–450)
RBC # BLD AUTO: 4.37 10E12/L (ref 4.4–5.9)
TIBC SERPL-MCNC: 477 UG/DL (ref 240–430)
TRANSFERRIN SERPL-MCNC: 361 MG/DL (ref 210–360)
WBC # BLD AUTO: 6.9 10E9/L (ref 4–11)

## 2018-07-31 PROCEDURE — 36415 COLL VENOUS BLD VENIPUNCTURE: CPT | Performed by: PHYSICIAN ASSISTANT

## 2018-07-31 PROCEDURE — 85025 COMPLETE CBC W/AUTO DIFF WBC: CPT | Performed by: PHYSICIAN ASSISTANT

## 2018-07-31 PROCEDURE — 99213 OFFICE O/P EST LOW 20 MIN: CPT | Performed by: PHYSICIAN ASSISTANT

## 2018-07-31 PROCEDURE — 83036 HEMOGLOBIN GLYCOSYLATED A1C: CPT | Performed by: PHYSICIAN ASSISTANT

## 2018-07-31 PROCEDURE — 83540 ASSAY OF IRON: CPT | Performed by: PHYSICIAN ASSISTANT

## 2018-07-31 PROCEDURE — 83550 IRON BINDING TEST: CPT | Performed by: PHYSICIAN ASSISTANT

## 2018-07-31 PROCEDURE — 84466 ASSAY OF TRANSFERRIN: CPT | Performed by: PHYSICIAN ASSISTANT

## 2018-07-31 PROCEDURE — 82607 VITAMIN B-12: CPT | Performed by: PHYSICIAN ASSISTANT

## 2018-07-31 RX ORDER — PAROXETINE 30 MG/1
30 TABLET, FILM COATED ORAL AT BEDTIME
Qty: 90 TABLET | Refills: 1 | Status: SHIPPED | OUTPATIENT
Start: 2018-07-31 | End: 2019-04-17

## 2018-07-31 ASSESSMENT — ANXIETY QUESTIONNAIRES
GAD7 TOTAL SCORE: 0
6. BECOMING EASILY ANNOYED OR IRRITABLE: NOT AT ALL
5. BEING SO RESTLESS THAT IT IS HARD TO SIT STILL: NOT AT ALL
4. TROUBLE RELAXING: NOT AT ALL
7. FEELING AFRAID AS IF SOMETHING AWFUL MIGHT HAPPEN: NOT AT ALL
2. NOT BEING ABLE TO STOP OR CONTROL WORRYING: NOT AT ALL
GAD7 TOTAL SCORE: 0
1. FEELING NERVOUS, ANXIOUS, OR ON EDGE: NOT AT ALL
3. WORRYING TOO MUCH ABOUT DIFFERENT THINGS: NOT AT ALL
7. FEELING AFRAID AS IF SOMETHING AWFUL MIGHT HAPPEN: NOT AT ALL
GAD7 TOTAL SCORE: 0

## 2018-07-31 ASSESSMENT — PATIENT HEALTH QUESTIONNAIRE - PHQ9
10. IF YOU CHECKED OFF ANY PROBLEMS, HOW DIFFICULT HAVE THESE PROBLEMS MADE IT FOR YOU TO DO YOUR WORK, TAKE CARE OF THINGS AT HOME, OR GET ALONG WITH OTHER PEOPLE: NOT DIFFICULT AT ALL
SUM OF ALL RESPONSES TO PHQ QUESTIONS 1-9: 0
SUM OF ALL RESPONSES TO PHQ QUESTIONS 1-9: 0

## 2018-07-31 ASSESSMENT — PAIN SCALES - GENERAL: PAINLEVEL: NO PAIN (0)

## 2018-07-31 NOTE — LETTER
My Depression Action Plan  Name: Roque Mckeon   Date of Birth 1935  Date: 7/26/2018    My doctor: Adam Marc   My clinic: [address] [address] [phone]          GREEN    ZONE   Good Control    What it looks like:     Things are going generally well. You have normal up s and down s. You may even feel depressed from time to time, but bad moods usually last less than a day.   What you need to do:  1. Continue to care for yourself (see self care plan)  2. Check your depression survival kit and update it as needed  3. Follow your physician s recommendations including any medication.  4. Do not stop taking medication unless you consult with your physician first.           YELLOW         ZONE Getting Worse    What it looks like:     Depression is starting to interfere with your life.     It may be hard to get out of bed; you may be starting to isolate yourself from others.    Symptoms of depression are starting to last most all day and this has happened for several days.     You may have suicidal thoughts but they are not constant.   What you need to do:     1. Call your care team, your response to treatment will improve if you keep your care team informed of your progress. Yellow periods are signs an adjustment may need to be made.     2. Continue your self-care, even if you have to fake it!    3. Talk to someone in your support network    4. Open up your depression survival kit           RED    ZONE Medical Alert - Get Help    What it looks like:     Depression is seriously interfering with your life.     You may experience these or other symptoms: You can t get out of bed most days, can t work or engage in other necessary activities, you have trouble taking care of basic hygiene, or basic responsibilities, thoughts of suicide or death that will not go away, self-injurious behavior.     What you need to do:  1. Call your care team and  request a same-day appointment. If they are not available (weekends or after hours) call your local crisis line, emergency room or 911.            Depression Self Care Plan / Survival Kit    Self-Care for Depression  Here s the deal. Your body and mind are really not as separate as most people think.  What you do and think affects how you feel and how you feel influences what you do and think. This means if you do things that people who feel good do, it will help you feel better.  Sometimes this is all it takes.  There is also a place for medication and therapy depending on how severe your depression is, so be sure to consult with your medical provider and/ or Behavioral Health Consultant if your symptoms are worsening or not improving.     In order to better manage my stress, I will:    Exercise  Get some form of exercise, every day. This will help reduce pain and release endorphins, the  feel good  chemicals in your brain. This is almost as good as taking antidepressants!  This is not the same as joining a gym and then never going! (they count on that by the way ) It can be as simple as just going for a walk or doing some gardening, anything that will get you moving.      Hygiene   Maintain good hygiene (Get out of bed in the morning, Make your bed, Brush your teeth, Take a shower, and Get dressed like you were going to work, even if you are unemployed).  If your clothes don't fit try to get ones that do.    Diet  I will strive to eat foods that are good for me, drink plenty of water, and avoid excessive sugar, caffeine, alcohol, and other mood-altering substances.  Some foods that are helpful in depression are: complex carbohydrates, B vitamins, flaxseed, fish or fish oil, fresh fruits and vegetables.    Psychotherapy  I agree to participate in Individual Therapy (if recommended).    Medication  If prescribed medications, I agree to take them.  Missing doses can result in serious side effects.  I understand that  drinking alcohol, or other illicit drug use, may cause potential side effects.  I will not stop my medication abruptly without first discussing it with my provider.    Staying Connected With Others  I will stay in touch with my friends, family members, and my primary care provider/team.    Use your imagination  Be creative.  We all have a creative side; it doesn t matter if it s oil painting, sand castles, or mud pies! This will also kick up the endorphins.    Witness Beauty  (AKA stop and smell the roses) Take a look outside, even in mid-winter. Notice colors, textures. Watch the squirrels and birds.     Service to others  Be of service to others.  There is always someone else in need.  By helping others we can  get out of ourselves  and remember the really important things.  This also provides opportunities for practicing all the other parts of the program.    Humor  Laugh and be silly!  Adjust your TV habits for less news and crime-drama and more comedy.    Control your stress  Try breathing deep, massage therapy, biofeedback, and meditation. Find time to relax each day.     My support system    Clinic Contact:  Phone number:    Contact 1:  Phone number:    Contact 2:  Phone number:    [employer]/:  Phone number:    Therapist:  Phone number:    Local crisis center:    Phone number:    Other community support:  Phone number:

## 2018-07-31 NOTE — MR AVS SNAPSHOT
"              After Visit Summary   7/31/2018    Roque Mckeon    MRN: [id number]           Patient Information     Date Of Birth          1935        Visit Information        Provider Department      7/31/2018 3:00 PM Adam Marc PA-C [facility]        Today's Diagnoses     Adjustment disorder with mixed anxiety and depressed mood    -  1    Anxiety and depression        Hyperlipidemia LDL goal <100        Major depression in complete remission (H)        Type 2 diabetes mellitus without complication, without long-term current use of insulin (H)        Low hemoglobin           Follow-ups after your visit        Who to contact     If you have questions or need follow up information about today's clinic visit or your schedule please contact [facility] directly at [phone].  Normal or non-critical lab and imaging results will be communicated to you by MyChart, letter or phone within 4 business days after the clinic has received the results. If you do not hear from us within 7 days, please contact the clinic through MyChart or phone. If you have a critical or abnormal lab result, we will notify you by phone as soon as possible.  Submit refill requests through [employer] or call your pharmacy and they will forward the refill request to us. Please allow 3 business days for your refill to be completed.          Additional Information About Your Visit        Care EveryWhere ID     This is your Care EveryWhere ID. This could be used by other organizations to access your [facility] medical records  [id number]        Your Vitals Were     Pulse Temperature Respirations Height BMI (Body Mass Index)       100 98  F (36.7  C) (Temporal) 18 5' 11\" (1.803 m) 25.24 kg/m2        Blood Pressure from Last 3 Encounters:   07/31/18 120/76   01/04/18 124/70   07/20/17 126/78    Weight from Last 3 Encounters:   07/31/18 181 lb (82.1 kg)   01/04/18 184 lb (83.5 kg)   07/20/17 168 lb 12.8 oz (76.6 " kg)              We Performed the Following     CBC with platelets and differential     HEMOGLOBIN A1C     Iron and iron binding capacity     Lipid panel reflex to direct LDL Fasting     Transferrin     Vitamin B12          Today's Medication Changes          These changes are accurate as of 7/31/18  3:20 PM.  If you have any questions, ask your nurse or doctor.               Start taking these medicines.        Dose/Directions    ACE/ARB/ARNI NOT PRESCRIBED (INTENTIONAL)   Used for:  Type 2 diabetes mellitus without complication, without long-term current use of insulin (H)   Started by:  Adam Marc PA-C        Please choose reason not prescribed, below   Refills:  0         These medicines have changed or have updated prescriptions.        Dose/Directions    PARoxetine 30 MG tablet   Commonly known as:  PAXIL   This may have changed:  See the new instructions.   Used for:  Anxiety and depression   Changed by:  Adam Marc PA-C        Dose:  30 mg   Take 1 tablet (30 mg) by mouth At Bedtime   Quantity:  90 tablet   Refills:  1            Where to get your medicines      These medications were sent to [id number] - [place], [place] - [address] [address]    Hours:  [facility]ly [place] - numbers unchanged   9/8/03 kr Phone:  [phone]     PARoxetine 30 MG tablet         Some of these will need a paper prescription and others can be bought over the counter.  Ask your nurse if you have questions.     You don't need a prescription for these medications     ACE/ARB/ARNI NOT PRESCRIBED (INTENTIONAL)                Primary Care Provider Office Phone # Fax #    Adam Marc PA-C [phone] [address] [facility] [address]        Equal Access to Services     [facility] AH: Hadvickie foxo Somagalis, waaxda luqadaha, qaybta kaalmada adeoliviayadoron, serena farfan. So [facility] [phone].    ATENCIÓN: Si habla español, tiene a appiah disposición  servicios gratuitos de asistencia lingüística. Monica wu [phone].    We comply with applicable federal civil rights laws and [place] laws. We do not discriminate on the basis of race, color, national origin, age, disability, sex, sexual orientation, or gender identity.            Thank you!     Thank you for choosing [facility]  for your care. Our goal is always to provide you with excellent care. Hearing back from our patients is one way we can continue to improve our services. Please take a few minutes to complete the written survey that you may receive in the mail after your visit with us. Thank you!             Your Updated Medication List - Protect others around you: Learn how to safely use, store and throw away your medicines at www.disposemymeds.org.          This list is accurate as of 7/31/18  3:20 PM.  Always use your most recent med list.                   Brand Name Dispense Instructions for use Diagnosis    ACE NOT PRESCRIBED (INTENTIONAL)      by Other route continuous prn.        ACE/ARB/ARNI NOT PRESCRIBED (INTENTIONAL)      Please choose reason not prescribed, below    Type 2 diabetes mellitus without complication, without long-term current use of insulin (H)       allopurinol 300 MG tablet    ZYLOPRIM    90 tablet    Take 1 tablet by mouth daily.    Calculus of kidney       aspirin 81 MG tablet     0    1 tab po QD (Once per day)    Type II or unspecified type diabetes mellitus without mention of complication, not stated as uncontrolled       blood glucose monitoring lancets     1 each    test 1 times daily    Type 2 diabetes mellitus without complication, without long-term current use of insulin (H)       * Blood Glucose Monitoring Suppl Misc     100 each    1 strip daily Or as directed.    Type 2 diabetes, HbA1c goal < 7% (H)       * blood glucose monitoring meter device kit    no brand specified    1 kit    Use to test blood sugar 1 times daily or as directed.    Type 2  diabetes, HbA1c goal < 7% (H)       blood glucose monitoring test strip    ONETOUCH ULTRA    300 strip    Test blood sugar 3 times daily    Type 2 diabetes mellitus without complication, without long-term current use of insulin (H)       fish oil-omega-3 fatty acids 1000 MG capsule      1 capsule 2 times per day    Type II or unspecified type diabetes mellitus without mention of complication, not stated as uncontrolled       metFORMIN 1000 MG tablet    GLUCOPHAGE    90 tablet    Take 1 tablet (1,000 mg) by mouth daily (with dinner)        MULTIVITAMIN & MINERAL PO      Take 1 tablet by mouth daily        PARoxetine 30 MG tablet    PAXIL    90 tablet    Take 1 tablet (30 mg) by mouth At Bedtime    Anxiety and depression       potassium citrate 10 MEQ (1080 MG) CR tablet    UROCIT-K    270 tablet    Take 1 tablet by mouth 3 times daily (with meals).    Calculus of kidney       triamcinolone 0.1 % cream    KENALOG    30 g    Apply sparingly to affected area three times daily for 14 days.    Bug bites, initial encounter, Rash and nonspecific skin eruption       ZOCOR 80 MG tablet   Generic drug:  simvastatin     90 tablet    Take 0.5 tablets (40 mg) by mouth daily    Hyperlipidemia LDL goal <100       * Notice:  This list has 2 medication(s) that are the same as other medications prescribed for you. Read the directions carefully, and ask your doctor or other care provider to review them with you.

## 2018-08-01 LAB — VIT B12 SERPL-MCNC: 332 PG/ML (ref 193–986)

## 2018-08-01 ASSESSMENT — PATIENT HEALTH QUESTIONNAIRE - PHQ9: SUM OF ALL RESPONSES TO PHQ QUESTIONS 1-9: 0

## 2018-08-01 ASSESSMENT — ANXIETY QUESTIONNAIRES: GAD7 TOTAL SCORE: 0

## 2018-08-02 ENCOUNTER — TELEPHONE (OUTPATIENT)
Dept: FAMILY MEDICINE | Facility: OTHER | Age: 83
End: 2018-08-02

## 2018-08-02 NOTE — TELEPHONE ENCOUNTER
LMTC: Please see message below.  Melvi Cespedes CMA ([facility])      Notes Recorded by Adam Marc PA-C on 8/2/2018 at 7:07 AM  Anemia appears to be stable at this point in time.  Vitamin B12 and iron stores are within normal limits.  More than likely due to age-related changes.  If he has any further questions I would have him make a follow-up appointment to recheck this in about 4-6 weeks.  Electronically signed:    Adam Marc PA-C

## 2018-08-27 ENCOUNTER — TRANSFERRED RECORDS (OUTPATIENT)
Dept: HEALTH INFORMATION MANAGEMENT | Facility: CLINIC | Age: 83
End: 2018-08-27

## 2018-09-25 ENCOUNTER — TRANSFERRED RECORDS (OUTPATIENT)
Dept: HEALTH INFORMATION MANAGEMENT | Facility: CLINIC | Age: 83
End: 2018-09-25

## 2018-10-24 NOTE — PROGRESS NOTES
SUBJECTIVE:   Roque Mckeon is a 82 year old male who presents to clinic today for the following health issues:      HPI  Patient had [facility] screening completed on 09/25/18. Patients records show a significant blockage in Right carotid artery.   Problem list and histories reviewed & adjusted, as indicated.  Additional history: as documented    Patient Active Problem List   Diagnosis     Other left bundle branch block     HYPERLIPIDEMIA LDL GOAL <100     Major depression in complete remission (H)     Advanced directives, counseling/discussion     Encounter for long-term current use of medication     Gout     Type 2 diabetes mellitus without complication, without long-term current use of insulin (H)     Tick-borne disease     Adjustment disorder with mixed anxiety and depressed mood     Anxiety     Past Surgical History:   Procedure Laterality Date     COLONOSCOPY  8/3/2011    Procedure:COMBINED COLONOSCOPY, SINGLE BIOPSY/POLYPECTOMY BY BIOPSY; Surgeon:GRUPO STONER; Location: GI     COLONOSCOPY  2016    [facility] COLONOSCOPY W/WO BRUSH/WASH  02/27/2001    Normal.     HC FRAGMENTING OF KIDNEY STONE  03/05/2003    Left extracorporal shock wave lithotripsy, [facility]     PHACOEMULSIFICATION WITH STANDARD INTRAOCULAR LENS IMPLANT  1/20/2011    PHACOEMULSIFICATION WITH STANDARD INTRAOCULAR LENS IMPLANT performed by OSMAN CHO at  OR       Social History   Substance Use Topics     Smoking status: Never Smoker     Smokeless tobacco: Never Used      Comment: never     Alcohol use No     Family History   Problem Relation Age of Onset     Cardiovascular Mother      Arthritis Mother      HEART DISEASE Mother      Cerebrovascular Disease Mother      Cardiovascular Father      HEART DISEASE Father      Depression Son      Depression Sister      Genetic Disorder Son      Osteoporosis Sister      Psychotic Disorder Son      Breast Cancer Sister      Alcohol/Drug No family hx of      Circulatory No  family hx of      Allergies No family hx of      Alzheimer Disease No family hx of      Blood Disease No family hx of      Cancer No family hx of      Diabetes No family hx of      GASTROINTESTINAL DISEASE No family hx of      Genitourinary Problems No family hx of      Lipids No family hx of      Neurologic Disorder No family hx of      Thyroid Disease No family hx of          Current Outpatient Prescriptions   Medication Sig Dispense Refill     ACE NOT PRESCRIBED, INTENTIONAL, by Other route continuous prn.  0     ACE/ARB/ARNI NOT PRESCRIBED, INTENTIONAL, Please choose reason not prescribed, below       allopurinol (ZYLOPRIM) 300 MG tablet Take 1 tablet by mouth daily. 90 tablet 3     aspirin 325 MG EC tablet Take 1 tablet (325 mg) by mouth daily 90 tablet 3     ASPIRIN 81 MG OR TABS 1 tab po QD (Once per day) 0 0     blood glucose monitoring (NO BRAND SPECIFIED) meter device kit Use to test blood sugar 1 times daily or as directed. 1 kit 0     blood glucose monitoring (ONE TOUCH ULTRASOFT) lancets test 1 times daily 1 each 11     blood glucose monitoring (ONETOUCH ULTRA) test strip Test blood sugar 3 times daily 300 strip 3     Blood Glucose Monitoring Suppl MISC 1 strip daily Or as directed. 100 each 0     metFORMIN (GLUCOPHAGE) 1000 MG tablet Take 1 tablet (1,000 mg) by mouth daily (with dinner) 90 tablet 3     Multiple Vitamins-Minerals (MULTIVITAMIN & MINERAL PO) Take 1 tablet by mouth daily       PARoxetine (PAXIL) 30 MG tablet Take 1 tablet (30 mg) by mouth At Bedtime 90 tablet 1     potassium citrate (UROCIT-K 10) 10 MEQ (1080 MG) SR tablet Take 1 tablet by mouth 3 times daily (with meals). 270 tablet 3     simvastatin (ZOCOR) 80 MG tablet Take 0.5 tablets (40 mg) by mouth daily 90 tablet 3     FISH OIL 1000 MG OR CAPS 1 capsule 2 times per day  0     triamcinolone (KENALOG) 0.1 % cream Apply sparingly to affected area three times daily for 14 days. (Patient not taking: Reported on 10/29/2018) 30 g 0  "    Allergies   Allergen Reactions     No Known Drug Allergies      Recent Labs   Lab Test  07/31/18   1526  01/04/18   0953 05/31/17 03/16/16   1133  03/25/15   1004  04/04/14   1448  09/18/13   0749   A1C  7.3*  7.7*  7.7*   < >  7.7*  7.8*  7.5*  6.9*   LDL   --    --    --    --   72  61   --   79   HDL   --    --   53   --   53  52   --   46   TRIG   --    --   127   --   173*  93   --   141   ALT   --   31  38   --   32  32   --   39   CR   --   0.87  1.0   --   0.98  0.88  0.93   --    GFRESTIMATED   --   84  >60   --   74  84  79   --    GFRESTBLACK   --   >90   --    --   89  >90   GFR Calc    >90   --    POTASSIUM   --   4.5  4.9   --   4.6  4.3   --    --    TSH   --   2.65   --    --    --    --   2.24   --     < > = values in this interval not displayed.      BP Readings from Last 3 Encounters:   10/29/18 122/72   07/31/18 120/76   01/04/18 124/70    Wt Readings from Last 3 Encounters:   10/29/18 187 lb 6.4 oz (85 kg)   07/31/18 181 lb (82.1 kg)   01/04/18 184 lb (83.5 kg)                    ROS:  Constitutional, HEENT, cardiovascular, pulmonary, GI, , musculoskeletal, neuro, skin, endocrine and psych systems are negative, except as otherwise noted.    OBJECTIVE:     /72 (Cuff Size: Adult Regular)  Pulse 78  Temp 98.3  F (36.8  C) (Temporal)  Resp 18  Ht 5' 11\" (1.803 m)  Wt 187 lb 6.4 oz (85 kg)  SpO2 96%  BMI 26.14 kg/m2  Body mass index is 26.14 kg/(m^2).  GENERAL: healthy, alert and no distress  HENT: ear canals and TM's normal, nose and mouth without ulcers or lesions  NECK: no adenopathy, no asymmetry, masses, or scars and suspected carotid bruit noted:  right  RESP: lungs clear to auscultation - no rales, rhonchi or wheezes  CV: regular rate and rhythm, normal S1 S2, no S3 or S4, no murmur, click or rub, no peripheral edema and peripheral pulses strong  MS: no gross musculoskeletal defects noted, no edema  NEURO: Normal strength and tone, mentation intact and " speech normal  PSYCH: mentation appears normal, affect normal/bright    Diagnostic Test Results:  No results found for this or any previous visit (from the past 24 hour(s)).    ASSESSMENT/PLAN:     1. Type 2 diabetes mellitus without complication, without long-term current use of insulin (H)  2. Need for prophylactic vaccination and inoculation against influenza  3. Abnormal finding on screening procedure  4. Bruit of right carotid artery  Patient had a screening carotid ultrasound done at an outside facility giving rise to concern for severe carotid artery narrowing.  He will be subjected to an ultrasound and read by radiology for quantification.  He is to see our surgical specialist related to this sometime in the near future once the ultrasound has been completed.  - US Carotid Bilateral; Future  - VASCULAR SURGERY REFERRAL  - aspirin 325 MG EC tablet; Take 1 tablet (325 mg) by mouth daily  Dispense: 90 tablet; Refill: 3    Regular exercise  A full aspirin a day is recommended for now until he is able to be consulted upon by our surgical specialist.    Adam Carvajal PA-C  [facility] [place] for HPI/ROS submitted by the patient on 10/29/2018   If you checked off any problems, how difficult have these problems made it for you to do your work, take care of things at home, or get along with other people?: Not difficult at all  PHQ9 TOTAL SCORE: 2  CHERI 7 TOTAL SCORE: 0

## 2018-10-29 ENCOUNTER — TELEPHONE (OUTPATIENT)
Dept: OTHER | Facility: CLINIC | Age: 83
End: 2018-10-29

## 2018-10-29 ENCOUNTER — OFFICE VISIT (OUTPATIENT)
Dept: FAMILY MEDICINE | Facility: OTHER | Age: 83
End: 2018-10-29
Payer: COMMERCIAL

## 2018-10-29 VITALS
HEART RATE: 78 BPM | HEIGHT: 71 IN | DIASTOLIC BLOOD PRESSURE: 72 MMHG | SYSTOLIC BLOOD PRESSURE: 122 MMHG | TEMPERATURE: 98.3 F | BODY MASS INDEX: 26.23 KG/M2 | WEIGHT: 187.4 LBS | RESPIRATION RATE: 18 BRPM | OXYGEN SATURATION: 96 %

## 2018-10-29 DIAGNOSIS — R68.89 ABNORMAL FINDING ON SCREENING PROCEDURE: ICD-10-CM

## 2018-10-29 DIAGNOSIS — E11.9 TYPE 2 DIABETES MELLITUS WITHOUT COMPLICATION, WITHOUT LONG-TERM CURRENT USE OF INSULIN (H): Primary | ICD-10-CM

## 2018-10-29 DIAGNOSIS — R09.89 BRUIT OF RIGHT CAROTID ARTERY: ICD-10-CM

## 2018-10-29 DIAGNOSIS — Z23 NEED FOR PROPHYLACTIC VACCINATION AND INOCULATION AGAINST INFLUENZA: ICD-10-CM

## 2018-10-29 PROCEDURE — 99214 OFFICE O/P EST MOD 30 MIN: CPT | Performed by: PHYSICIAN ASSISTANT

## 2018-10-29 ASSESSMENT — ANXIETY QUESTIONNAIRES
7. FEELING AFRAID AS IF SOMETHING AWFUL MIGHT HAPPEN: NOT AT ALL
GAD7 TOTAL SCORE: 0
7. FEELING AFRAID AS IF SOMETHING AWFUL MIGHT HAPPEN: NOT AT ALL
2. NOT BEING ABLE TO STOP OR CONTROL WORRYING: NOT AT ALL
4. TROUBLE RELAXING: NOT AT ALL
GAD7 TOTAL SCORE: 0
5. BEING SO RESTLESS THAT IT IS HARD TO SIT STILL: NOT AT ALL
6. BECOMING EASILY ANNOYED OR IRRITABLE: NOT AT ALL
3. WORRYING TOO MUCH ABOUT DIFFERENT THINGS: NOT AT ALL
1. FEELING NERVOUS, ANXIOUS, OR ON EDGE: NOT AT ALL
GAD7 TOTAL SCORE: 0

## 2018-10-29 ASSESSMENT — PAIN SCALES - GENERAL: PAINLEVEL: NO PAIN (0)

## 2018-10-29 ASSESSMENT — PATIENT HEALTH QUESTIONNAIRE - PHQ9
SUM OF ALL RESPONSES TO PHQ QUESTIONS 1-9: 2
SUM OF ALL RESPONSES TO PHQ QUESTIONS 1-9: 2
10. IF YOU CHECKED OFF ANY PROBLEMS, HOW DIFFICULT HAVE THESE PROBLEMS MADE IT FOR YOU TO DO YOUR WORK, TAKE CARE OF THINGS AT HOME, OR GET ALONG WITH OTHER PEOPLE: NOT DIFFICULT AT ALL

## 2018-10-29 NOTE — TELEPHONE ENCOUNTER
"Pt referred to [facility] by Adam Marc PA-C for bruit of right carotid artery- \"Patient had a screening carotid ultrasound done at an outside facility giving rise to concern for severe carotid artery narrowing.\"      No recent imaging in [facility]- please ask patient to bring any records related to screening.     Patient is scheduled for US carotid bilateral on this Wednesday in [place] on 10/31/18    Pt needs to be scheduled for consult with vascular surgery.  Will route to scheduling to coordinate an appointment at next available.    ISAAC SantiagoN, RN    "

## 2018-10-29 NOTE — TELEPHONE ENCOUNTER
Patient is scheduled to review US with Dr. Ma and will call us to schedule if he needs to after that appt

## 2018-10-29 NOTE — MR AVS SNAPSHOT
After Visit Summary   10/29/2018    Roque Mckeon    MRN: [id number]           Patient Information     Date Of Birth          1935        Visit Information        Provider Department      10/29/2018 11:00 AM dAam Marc PA-C [facility]        Today's Diagnoses     Type 2 diabetes mellitus without complication, without long-term current use of insulin (H)    -  1    Need for prophylactic vaccination and inoculation against influenza        Abnormal finding on screening procedure        Bruit of right carotid artery           Follow-ups after your visit        Additional Services     VASCULAR SURGERY REFERRAL       Your provider has referred you to: FMG: [facility] [phone]   [email or web address]  FM: [facility] [phone]   [email or web address]    Please be aware that coverage of these services is subject to the terms and limitations of your health insurance plan.  Call member services at your health plan with any benefit or coverage questions.      Please bring the following with you to your appointment:    (1) Any X-Rays, CTs or MRIs which have been performed.  Contact the facility where they were done to arrange for  prior to your scheduled appointment.    (2) List of current medications   (3) This referral request   (4) Any documents/labs given to you for this referral                  Follow-up notes from your care team     Return in about 4 weeks (around 11/26/2018) for recheck of current condition.      Your next 10 appointments already scheduled     Oct 31, 2018 11:00 AM CDT   [facility]    [address] [address]   [phone]           How do I prepare for my exam? (Food and drink instructions) No Food and Drink Restrictions.  How do I prepare for my exam?  (Other instructions) You do not need to do anything special to prepare for your exam.  What should I wear: Wear comfortable clothes.  How long does the exam take: Most ultrasounds take 30 to 60 minutes.  What should I bring: Bring a list of your medicines, including vitamins, minerals and over-the-counter drugs. It is safest to leave personal items at home.  Do I need a :  No  is needed.  What do I need to tell my doctor: Tell your doctor about any allergies you may have.  What should I do after the exam: No restrictions, You may resume normal activities.  What is this test: An ultrasound uses sound waves to make pictures of the body. Sound waves do not cause pain. The only discomfort may be the pressure of the wand against your skin or full bladder.  Who should I call with questions: If you have any questions, please call the Imaging Department where you will have your exam. Directions, parking instructions, and other information is available on our website, [email or web address].            Nov 05, 2018  8:30 AM CST   [facility] Visit with Phillip Ma MD   [facility]    [address] [address] [phone]              Future tests that were ordered for you today     Open Future Orders        Priority Expected Expires Ordered    US Carotid Bilateral Routine  10/29/2019 10/29/2018            Who to contact     If you have questions or need follow up information about today's clinic visit or your schedule please contact [facility] directly at [phone].  Normal or non-critical lab and imaging results will be communicated to you by MyChart, letter or phone within 4 business days after the clinic has received the results. If you do not hear from us within 7 days, please contact the clinic through MyChart or phone. If you have a critical or abnormal lab result, we will notify you by phone as soon as possible.  Submit refill  "requests through [employer] or call your pharmacy and they will forward the refill request to us. Please allow 3 business days for your refill to be completed.          Additional Information About Your Visit        Care EveryWhere ID     This is your Care EveryWhere ID. This could be used by other organizations to access your [facility] medical records  [id number]        Your Vitals Were     Pulse Temperature Respirations Height Pulse Oximetry BMI (Body Mass Index)    78 98.3  F (36.8  C) (Temporal) 18 5' 11\" (1.803 m) 96% 26.14 kg/m2       Blood Pressure from Last 3 Encounters:   10/29/18 122/72   07/31/18 120/76   01/04/18 124/70    Weight from Last 3 Encounters:   10/29/18 187 lb 6.4 oz (85 kg)   07/31/18 181 lb (82.1 kg)   01/04/18 184 lb (83.5 kg)              We Performed the Following     VASCULAR SURGERY REFERRAL          Today's Medication Changes          These changes are accurate as of 10/29/18 11:30 AM.  If you have any questions, ask your nurse or doctor.               These medicines have changed or have updated prescriptions.        Dose/Directions    * aspirin 81 MG tablet   This may have changed:  Another medication with the same name was added. Make sure you understand how and when to take each.   Used for:  Type II or unspecified type diabetes mellitus without mention of complication, not stated as uncontrolled   Changed by:  Adam Marc PA-C        1 tab po QD (Once per day)   Quantity:  0   Refills:  0       * aspirin 325 MG EC tablet   This may have changed:  You were already taking a medication with the same name, and this prescription was added. Make sure you understand how and when to take each.   Used for:  Type 2 diabetes mellitus without complication, without long-term current use of insulin (H), Need for prophylactic vaccination and inoculation against influenza, Abnormal finding on screening procedure, Bruit of right carotid artery   Changed by:  Adam Marc PA-C        Dose:  325 " mg   Take 1 tablet (325 mg) by mouth daily   Quantity:  90 tablet   Refills:  3       * Notice:  This list has 2 medication(s) that are the same as other medications prescribed for you. Read the directions carefully, and ask your doctor or other care provider to review them with you.         Where to get your medicines      These medications were sent to [id number] - [place], [place] - [address]  [address], [facility] [address]    Hours:  [place]ly [place] - numbers unchanged   9/8/03 kr Phone:  [phone]     aspirin 325 MG EC tablet                Primary Care Provider Office Phone # Fax #    Adam Marc PA-C [phone] [address] [address]        Equal Access to Services     BENITA GRISSOM : Hadii christelle foxo Somagalis, waaxda luqadaha, qaybta kaalmada adeegyadoron, serena farfan. [facility] [phone].    ATENCIÓN: Si habla español, tiene a appiah disposición servicios gratuitos de asistencia lingüística. [facility] [phone].    We comply with applicable federal civil rights laws and [place] laws. We do not discriminate on the basis of race, color, national origin, age, disability, sex, sexual orientation, or gender identity.            Thank you!     Thank you for choosing [facility]  for your care. Our goal is always to provide you with excellent care. Hearing back from our patients is one way we can continue to improve our services. Please take a few minutes to complete the written survey that you may receive in the mail after your visit with us. Thank you!             Your Updated Medication List - Protect others around you: Learn how to safely use, store and throw away your medicines at www.disposemymeds.org.          This list is accurate as of 10/29/18 11:30 AM.  Always use your most recent med list.                   Brand Name Dispense Instructions for use Diagnosis    ACE NOT PRESCRIBED (INTENTIONAL)      by Other  route continuous prn.        ACE/ARB/ARNI NOT PRESCRIBED (INTENTIONAL)      Please choose reason not prescribed, below    Type 2 diabetes mellitus without complication, without long-term current use of insulin (H)       allopurinol 300 MG tablet    ZYLOPRIM    90 tablet    Take 1 tablet by mouth daily.    Calculus of kidney       * aspirin 81 MG tablet     0    1 tab po QD (Once per day)    Type II or unspecified type diabetes mellitus without mention of complication, not stated as uncontrolled       * aspirin 325 MG EC tablet     90 tablet    Take 1 tablet (325 mg) by mouth daily    Type 2 diabetes mellitus without complication, without long-term current use of insulin (H), Need for prophylactic vaccination and inoculation against influenza, Abnormal finding on screening procedure, Bruit of right carotid artery       blood glucose monitoring lancets     1 each    test 1 times daily    Type 2 diabetes mellitus without complication, without long-term current use of insulin (H)       * Blood Glucose Monitoring Suppl Misc     100 each    1 strip daily Or as directed.    Type 2 diabetes, HbA1c goal < 7% (H)       * blood glucose monitoring meter device kit    no brand specified    1 kit    Use to test blood sugar 1 times daily or as directed.    Type 2 diabetes, HbA1c goal < 7% (H)       blood glucose monitoring test strip    ONETOUCH ULTRA    300 strip    Test blood sugar 3 times daily    Type 2 diabetes mellitus without complication, without long-term current use of insulin (H)       fish oil-omega-3 fatty acids 1000 MG capsule      1 capsule 2 times per day    Type II or unspecified type diabetes mellitus without mention of complication, not stated as uncontrolled       metFORMIN 1000 MG tablet    GLUCOPHAGE    90 tablet    Take 1 tablet (1,000 mg) by mouth daily (with dinner)        MULTIVITAMIN & MINERAL PO      Take 1 tablet by mouth daily        PARoxetine 30 MG tablet    PAXIL    90 tablet    Take 1 tablet (30  mg) by mouth At Bedtime    Anxiety and depression       potassium citrate 10 MEQ (1080 MG) CR tablet    UROCIT-K    270 tablet    Take 1 tablet by mouth 3 times daily (with meals).    Calculus of kidney       triamcinolone 0.1 % cream    KENALOG    30 g    Apply sparingly to affected area three times daily for 14 days.    Bug bites, initial encounter, Rash and nonspecific skin eruption       ZOCOR 80 MG tablet   Generic drug:  simvastatin     90 tablet    Take 0.5 tablets (40 mg) by mouth daily    Hyperlipidemia LDL goal <100       * Notice:  This list has 4 medication(s) that are the same as other medications prescribed for you. Read the directions carefully, and ask your doctor or other care provider to review them with you.

## 2018-10-30 ASSESSMENT — ANXIETY QUESTIONNAIRES: GAD7 TOTAL SCORE: 0

## 2018-10-30 ASSESSMENT — PATIENT HEALTH QUESTIONNAIRE - PHQ9: SUM OF ALL RESPONSES TO PHQ QUESTIONS 1-9: 2

## 2018-10-31 ENCOUNTER — RADIANT APPOINTMENT (OUTPATIENT)
Dept: ULTRASOUND IMAGING | Facility: OTHER | Age: 83
End: 2018-10-31
Attending: PHYSICIAN ASSISTANT
Payer: COMMERCIAL

## 2018-10-31 DIAGNOSIS — E11.9 TYPE 2 DIABETES MELLITUS WITHOUT COMPLICATION, WITHOUT LONG-TERM CURRENT USE OF INSULIN (H): ICD-10-CM

## 2018-10-31 DIAGNOSIS — R68.89 ABNORMAL FINDING ON SCREENING PROCEDURE: ICD-10-CM

## 2018-10-31 DIAGNOSIS — Z23 NEED FOR PROPHYLACTIC VACCINATION AND INOCULATION AGAINST INFLUENZA: ICD-10-CM

## 2018-10-31 DIAGNOSIS — R09.89 BRUIT OF RIGHT CAROTID ARTERY: ICD-10-CM

## 2018-10-31 PROCEDURE — 93880 EXTRACRANIAL BILAT STUDY: CPT

## 2018-11-01 ENCOUNTER — TELEPHONE (OUTPATIENT)
Dept: FAMILY MEDICINE | Facility: OTHER | Age: 83
End: 2018-11-01

## 2018-11-01 NOTE — TELEPHONE ENCOUNTER
Attempted to reach patient with no answer, kept ringing.  Please try again later and inform patient of the message below (regarding US carotid bilateral results).  Carmine Gonzalez, [facility]    Patient needs to see Dr Ma in consultation as soon as practical.  Electronically signed:    Adam Carvajal PA-C

## 2018-11-05 ENCOUNTER — OFFICE VISIT (OUTPATIENT)
Dept: SURGERY | Facility: OTHER | Age: 83
End: 2018-11-05
Payer: COMMERCIAL

## 2018-11-05 ENCOUNTER — HOSPITAL ENCOUNTER (OUTPATIENT)
Dept: CT IMAGING | Facility: CLINIC | Age: 83
Discharge: HOME | End: 2018-11-05
Attending: SPECIALIST | Admitting: SPECIALIST
Payer: MEDICARE

## 2018-11-05 VITALS
BODY MASS INDEX: 25.94 KG/M2 | TEMPERATURE: 97.4 F | WEIGHT: 186 LBS | DIASTOLIC BLOOD PRESSURE: 60 MMHG | SYSTOLIC BLOOD PRESSURE: 112 MMHG

## 2018-11-05 DIAGNOSIS — I65.21 CAROTID STENOSIS, ASYMPTOMATIC, RIGHT: ICD-10-CM

## 2018-11-05 DIAGNOSIS — I65.22 LEFT CAROTID ARTERY STENOSIS: ICD-10-CM

## 2018-11-05 DIAGNOSIS — I65.21 CAROTID STENOSIS, ASYMPTOMATIC, RIGHT: Primary | ICD-10-CM

## 2018-11-05 PROCEDURE — 82565 ASSAY OF CREATININE: CPT

## 2018-11-05 PROCEDURE — 99204 OFFICE O/P NEW MOD 45 MIN: CPT | Performed by: SPECIALIST

## 2018-11-05 PROCEDURE — 25000128 H RX IP 250 OP 636: Performed by: RADIOLOGY

## 2018-11-05 PROCEDURE — 70498 CT ANGIOGRAPHY NECK: CPT

## 2018-11-05 PROCEDURE — 25000125 ZZHC RX 250: Performed by: RADIOLOGY

## 2018-11-05 RX ORDER — IOPAMIDOL 755 MG/ML
500 INJECTION, SOLUTION INTRAVASCULAR ONCE
Status: COMPLETED | OUTPATIENT
Start: 2018-11-05 | End: 2018-11-05

## 2018-11-05 RX ADMIN — IOPAMIDOL 70 ML: 755 INJECTION, SOLUTION INTRAVENOUS at 10:19

## 2018-11-05 RX ADMIN — SODIUM CHLORIDE 100 ML: 9 INJECTION, SOLUTION INTRAVENOUS at 10:19

## 2018-11-05 NOTE — NURSING NOTE
"Roque Mckeon is a 82 year old male who presents for:  Chief Complaint   Patient presents with     Consult     referring Adam Vazquez        Initial Vitals:  /60 (BP Location: Right arm, Patient Position: Sitting, Cuff Size: Adult Large)  Temp 97.4  F (36.3  C) (Temporal)  Wt 84.4 kg (186 lb)  BMI 25.94 kg/m2 Estimated body mass index is 25.94 kg/(m^2) as calculated from the following:    Height as of 10/29/18: 1.803 m (5' 11\").    Weight as of this encounter: 84.4 kg (186 lb).. Body surface area is 2.06 meters squared. BP completed using cuff size: large  Data Unavailable    Do you feel safe in your environment?  Yes  Do you need any refills today? No    Nursing Comments:         Isabelle Michael  "

## 2018-11-05 NOTE — LETTER
11/5/2018         RE: Roque Mckeon  [address]   [facility] [address]        Dear Colleague,    Thank you for referring your patient, Roque Mckeon, to the [facility]. Please see a copy of my visit note below.    Consult requested by Adam iWnters    Reason for consultation - right carotis stenosis.    HPI:  Patient is an 82-year-old white male who decided to go for [facility] screening due to his mother dying of a stroke around the same age he is at now.  The screening suggested a right carotid artery stenosis for which she is now referred.  He denies any prior stroke, TIA, amaurosis fugax, history of aneurysm, claudication, nonhealing wounds or rest pain.  He has never smoked.  He now presents for evaluation for possible right carotid artery stenosis.    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     HC FRAGMENTING OF KIDNEY STONE  03/05/2003    Left extracorporal shock wave lithotripsy, [facility]     PHACOEMULSIFICATION WITH STANDARD INTRAOCULAR LENS IMPLANT  1/20/2011    PHACOEMULSIFICATION WITH STANDARD INTRAOCULAR LENS IMPLANT performed by OSMAN CHO at  OR     Current Outpatient Prescriptions   Medication     ASPIRIN 81 MG OR TABS     ACE NOT  PRESCRIBED, INTENTIONAL,     ACE/ARB/ARNI NOT PRESCRIBED, INTENTIONAL,     allopurinol (ZYLOPRIM) 300 MG tablet     aspirin 325 MG EC tablet     blood glucose monitoring (NO BRAND SPECIFIED) meter device kit     blood glucose monitoring (ONE TOUCH ULTRASOFT) lancets     blood glucose monitoring (ONETOUCH ULTRA) test strip     Blood Glucose Monitoring Suppl MISC     FISH OIL 1000 MG OR CAPS     metFORMIN (GLUCOPHAGE) 1000 MG tablet     Multiple Vitamins-Minerals (MULTIVITAMIN & MINERAL PO)     PARoxetine (PAXIL) 30 MG tablet     potassium citrate (UROCIT-K 10) 10 MEQ (1080 MG) SR tablet     simvastatin (ZOCOR) 80 MG tablet     triamcinolone (KENALOG) 0.1 % cream     No current facility-administered medications for this visit.         Allergies   Allergen Reactions     No Known Drug Allergies      Social History   Substance Use Topics     Smoking status: Never Smoker     Smokeless tobacco: Never Used      Comment: never     Alcohol use No     Family History   Problem Relation Age of Onset     Cardiovascular Mother      Arthritis Mother      HEART DISEASE Mother      Cerebrovascular Disease Mother      Cardiovascular Father      HEART DISEASE Father      Depression Son      Depression Sister      Genetic Disorder Son      Osteoporosis Sister      Psychotic Disorder Son      Breast Cancer Sister      Alcohol/Drug No family hx of      Circulatory No family hx of      Allergies No family hx of      Alzheimer Disease No family hx of      Blood Disease No family hx of      Cancer No family hx of      Diabetes No family hx of      GASTROINTESTINAL DISEASE No family hx of      Genitourinary Problems No family hx of      Lipids No family hx of      Neurologic Disorder No family hx of      Thyroid Disease No family hx of       ROS: 10 point ROS neg other than the symptoms noted above in the HPI.    PE:  B/P: 112/60, T: 97.4, P: Data Unavailable, R: Data Unavailable  General: well developed, well nourished WM who appears his  stated age  HEENT: NC/AT, EOMI, (-)icterus, (-)injection  Neck: Supple, No JVD, no bruits  Chest: CTA  Heart: S1, S2, (-)m/r/g  Abd: Soft, non tender, non distended, no bruits or masses  Ext; Warm, no edema, Rt - (+)FEM/DP pulses, Lt (+)FEM/PT pulses  Psych: AAOx3  Neuro: No focal deficits    US -   US CAROTID BILATERAL 10/31/2018 11:34 AM     HISTORY: Right carotid bruit.     COMPARISON: None.     FINDINGS:      Right side:   On the grayscale images, there is severe mixed plaque at the carotid  bifurcation extending into the internal carotid artery.  Spectral waveform analysis was performed. Peak systolic and diastolic  velocities in the right internal carotid artery are 582 and 137 cm/s.  Per sonographic criteria, degree of stenosis in the right internal  carotid artery is greater than or equal to 70%/near occlusion..  Flow in the right vertebral artery is antegrade.      Left side:   On the grayscale images, there is mixed plaque at the carotid  bifurcation extending into the internal carotid artery.  Spectral waveform analysis was performed. Peak systolic and diastolic   velocities in the left internal carotid artery are 147 and 29 cm/s.  Per sonographic criteria, degree of stenosis in the left internal  carotid artery is 50-69%.  Flow in the left vertebral artery is antegrade.          IMPRESSION: Per sonographic criteria, there is a near occlusion of the  right internal carotid artery. There is a 50-69% stenosis of the left  internal carotid artery.     Recommend further evaluation by vascular surgery.     Degree of stenosis measured relative to the diameter of the normal  internal carotid artery per NASCET or NASCET type criteria     JONE SMITH MD      This is an 82-year-old gentleman with an ultrasound suggestive of a High gradge right carotid artery stenosis and 50-69% left carotid artery stenosuis that is asymptomatic.  The plan at this time is to get a CTA to confirm the degree of stenosis and then  proceed based on those findings.  He will remain on his aspirin in the meantime.  He knows to be seen should he develop any symptoms.  He will follow-up with me after his CTA.    Phillip Ma MD, FACS    Again, thank you for allowing me to participate in the care of your patient.        Sincerely,        Phillip Ma MD

## 2018-11-05 NOTE — MR AVS SNAPSHOT
After Visit Summary   11/5/2018    Roque Mckeon    MRN: [id number]           Patient Information     Date Of Birth          1935        Visit Information        Provider Department      11/5/2018 8:30 AM Phillip Ma MD [facility]        Today's Diagnoses     Carotid stenosis, asymptomatic, right    -  1       Follow-ups after your visit        Your next 10 appointments already scheduled     Nov 05, 2018 11:45 AM CST   (Arrive by 11:30 AM)   CTA NECK WITH CONTRAST with PHCT1   [facility] CT Scan ([facility])    [address] [place] [id number] [phone]           How do I prepare for my exam? (Food and drink instructions) **You will have contrast for this exam.** Do not eat or drink for 2 hours before your exam. If you need to take medicine, you may take it with small sips of water. (We may ask you to take liquid medicine as well.)  The day before your exam, drink extra fluids at least six 8-ounce glasses (unless your doctor tells you to restrict your fluids).  How do I prepare for my exam? (Other instructions) Patients over 70 or patients with diabetes or kidney problems: If you haven t had a blood test (creatinine test) within the last 30 days, the Cardiologist/Radiologist may require you to get this test prior to your exam.  What should I wear: Please wear loose clothing, such as a sweat suit or jogging clothes.  Avoid snaps, zippers and other metal. We may ask you to undress and put on a hospital gown.  How long does the exam take: Most scans take less than 20 minutes.  What should I bring: Please bring any scans or X-rays taken at other hospitals, if similar tests were done. Also bring a list of your medicines, including vitamins, minerals and over-the-counter drugs. It is safest to leave personal items at home.  Do I need a :  No  is needed.  What do I need to tell my doctor? Be sure to tell your doctor: * If you  have any allergies. * If there s any chance you are pregnant. * If you are breastfeeding. * If you have diabetes as your medication may need to be adjusted for this exam.  What should I do after the exam: No restrictions, You may resume normal activities.  What is this test: A CT (computed tomography) scan is a series of pictures that allows us to look inside your body. The scanner creates images of the body in cross sections, much like slices of bread. This helps us see any problems more clearly. You may receive contrast (X-ray dye) before or during your scan. Contrast is given through an IV (small needle in your arm).  Who should I call with questions: If you have any questions, please call the Imaging Department where you will have your exam. Directions, parking instructions, and other information is available on our website, [email or web address].              Future tests that were ordered for you today     Open Future Orders        Priority Expected Expires Ordered    CTA Neck with Contrast Routine  11/5/2019 11/5/2018            Who to contact     If you have questions or need follow up information about today's clinic visit or your schedule please contact [facility] directly at [phone].  Normal or non-critical lab and imaging results will be communicated to you by MyChart, letter or phone within 4 business days after the clinic has received the results. If you do not hear from us within 7 days, please contact the clinic through MyChart or phone. If you have a critical or abnormal lab result, we will notify you by phone as soon as possible.  Submit refill requests through [employer] or call your pharmacy and they will forward the refill request to us. Please allow 3 business days for your refill to be completed.          Additional Information About Your Visit        Care EveryWhere ID     This is your Care EveryWhere ID. This could be used by other organizations to access your [facility]  medical records  [id number]        Your Vitals Were     Temperature BMI (Body Mass Index)                97.4  F (36.3  C) (Temporal) 25.94 kg/m2           Blood Pressure from Last 3 Encounters:   11/05/18 112/60   10/29/18 122/72   07/31/18 120/76    Weight from Last 3 Encounters:   11/05/18 84.4 kg (186 lb)   10/29/18 85 kg (187 lb 6.4 oz)   07/31/18 82.1 kg (181 lb)               Primary Care Provider Office Phone # Fax #    Adam Marc PA-C [phone] [address] [facility] [address]        Equal Access to Services     BENITA GRISSOM : Saeid foxo Jarocho, wanicoleda luqadaha, qaybta kaalmada reagan, serena farfan. So [facility] [phone].    ATENCIÓN: Si habla español, tiene a appiah disposición servicios gratuitos de asistencia lingüística. Llame al [phone].    We comply with applicable federal civil rights laws and [place] laws. We do not discriminate on the basis of race, color, national origin, age, disability, sex, sexual orientation, or gender identity.            Thank you!     Thank you for choosing [facility]  for your care. Our goal is always to provide you with excellent care. Hearing back from our patients is one way we can continue to improve our services. Please take a few minutes to complete the written survey that you may receive in the mail after your visit with us. Thank you!             Your Updated Medication List - Protect others around you: Learn how to safely use, store and throw away your medicines at www.disposemymeds.org.          This list is accurate as of 11/5/18  8:47 AM.  Always use your most recent med list.                   Brand Name Dispense Instructions for use Diagnosis    ACE NOT PRESCRIBED (INTENTIONAL)      by Other route continuous prn.        ACE/ARB/ARNI NOT PRESCRIBED (INTENTIONAL)      Please choose reason not prescribed, below    Type 2 diabetes mellitus without complication, without  long-term current use of insulin (H)       allopurinol 300 MG tablet    ZYLOPRIM    90 tablet    Take 1 tablet by mouth daily.    Calculus of kidney       * aspirin 81 MG tablet     0    1 tab po QD (Once per day)    Type II or unspecified type diabetes mellitus without mention of complication, not stated as uncontrolled       * aspirin 325 MG EC tablet     90 tablet    Take 1 tablet (325 mg) by mouth daily    Type 2 diabetes mellitus without complication, without long-term current use of insulin (H), Need for prophylactic vaccination and inoculation against influenza, Abnormal finding on screening procedure, Bruit of right carotid artery       blood glucose monitoring lancets     1 each    test 1 times daily    Type 2 diabetes mellitus without complication, without long-term current use of insulin (H)       * Blood Glucose Monitoring Suppl Misc     100 each    1 strip daily Or as directed.    Type 2 diabetes, HbA1c goal < 7% (H)       * blood glucose monitoring meter device kit    no brand specified    1 kit    Use to test blood sugar 1 times daily or as directed.    Type 2 diabetes, HbA1c goal < 7% (H)       blood glucose monitoring test strip    ONETOUCH ULTRA    300 strip    Test blood sugar 3 times daily    Type 2 diabetes mellitus without complication, without long-term current use of insulin (H)       fish oil-omega-3 fatty acids 1000 MG capsule      1 capsule 2 times per day    Type II or unspecified type diabetes mellitus without mention of complication, not stated as uncontrolled       metFORMIN 1000 MG tablet    GLUCOPHAGE    90 tablet    Take 1 tablet (1,000 mg) by mouth daily (with dinner)        MULTIVITAMIN & MINERAL PO      Take 1 tablet by mouth daily        PARoxetine 30 MG tablet    PAXIL    90 tablet    Take 1 tablet (30 mg) by mouth At Bedtime    Anxiety and depression       potassium citrate 10 MEQ (1080 MG) CR tablet    UROCIT-K    270 tablet    Take 1 tablet by mouth 3 times daily (with  meals).    Calculus of kidney       triamcinolone 0.1 % cream    KENALOG    30 g    Apply sparingly to affected area three times daily for 14 days.    Bug bites, initial encounter, Rash and nonspecific skin eruption       ZOCOR 80 MG tablet   Generic drug:  simvastatin     90 tablet    Take 0.5 tablets (40 mg) by mouth daily    Hyperlipidemia LDL goal <100       * Notice:  This list has 4 medication(s) that are the same as other medications prescribed for you. Read the directions carefully, and ask your doctor or other care provider to review them with you.

## 2018-11-05 NOTE — PROGRESS NOTES
Consult requested by Adam Winters    Reason for consultation - right carotis stenosis.    HPI:  Patient is an 82-year-old white male who decided to go for [facility] screening due to his mother dying of a stroke around the same age he is at now.  The screening suggested a right carotid artery stenosis for which she is now referred.  He denies any prior stroke, TIA, amaurosis fugax, history of aneurysm, claudication, nonhealing wounds or rest pain.  He has never smoked.  He now presents for evaluation for possible right carotid artery stenosis.    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      FRAGMENTING OF KIDNEY STONE  03/05/2003    Left extracorporal shock wave lithotripsy, [facility]     PHACOEMULSIFICATION WITH STANDARD INTRAOCULAR LENS IMPLANT  1/20/2011    PHACOEMULSIFICATION WITH STANDARD INTRAOCULAR LENS IMPLANT performed by OSMAN CHO at  OR     Current Outpatient Prescriptions   Medication     ASPIRIN 81 MG OR TABS     ACE NOT PRESCRIBED, INTENTIONAL,     ACE/ARB/ARNI NOT PRESCRIBED, INTENTIONAL,     allopurinol (ZYLOPRIM) 300 MG tablet     aspirin 325 MG EC tablet     blood glucose monitoring (NO BRAND SPECIFIED) meter device kit     blood glucose monitoring (ONE  TOUCH ULTRASOFT) lancets     blood glucose monitoring (ONETOUCH ULTRA) test strip     Blood Glucose Monitoring Suppl MISC     FISH OIL 1000 MG OR CAPS     metFORMIN (GLUCOPHAGE) 1000 MG tablet     Multiple Vitamins-Minerals (MULTIVITAMIN & MINERAL PO)     PARoxetine (PAXIL) 30 MG tablet     potassium citrate (UROCIT-K 10) 10 MEQ (1080 MG) SR tablet     simvastatin (ZOCOR) 80 MG tablet     triamcinolone (KENALOG) 0.1 % cream     No current facility-administered medications for this visit.         Allergies   Allergen Reactions     No Known Drug Allergies      Social History   Substance Use Topics     Smoking status: Never Smoker     Smokeless tobacco: Never Used      Comment: never     Alcohol use No     Family History   Problem Relation Age of Onset     Cardiovascular Mother      Arthritis Mother      HEART DISEASE Mother      Cerebrovascular Disease Mother      Cardiovascular Father      HEART DISEASE Father      Depression Son      Depression Sister      Genetic Disorder Son      Osteoporosis Sister      Psychotic Disorder Son      Breast Cancer Sister      Alcohol/Drug No family hx of      Circulatory No family hx of      Allergies No family hx of      Alzheimer Disease No family hx of      Blood Disease No family hx of      Cancer No family hx of      Diabetes No family hx of      GASTROINTESTINAL DISEASE No family hx of      Genitourinary Problems No family hx of      Lipids No family hx of      Neurologic Disorder No family hx of      Thyroid Disease No family hx of       ROS: 10 point ROS neg other than the symptoms noted above in the HPI.    PE:  B/P: 112/60, T: 97.4, P: Data Unavailable, R: Data Unavailable  General: well developed, well nourished WM who appears his stated age  HEENT: NC/AT, EOMI, (-)icterus, (-)injection  Neck: Supple, No JVD, no bruits  Chest: CTA  Heart: S1, S2, (-)m/r/g  Abd: Soft, non tender, non distended, no bruits or masses  Ext; Warm, no edema, Rt - (+)FEM/DP pulses, Lt (+)FEM/PT  pulses  Psych: AAOx3  Neuro: No focal deficits    US -   US CAROTID BILATERAL 10/31/2018 11:34 AM     HISTORY: Right carotid bruit.     COMPARISON: None.     FINDINGS:      Right side:   On the grayscale images, there is severe mixed plaque at the carotid  bifurcation extending into the internal carotid artery.  Spectral waveform analysis was performed. Peak systolic and diastolic  velocities in the right internal carotid artery are 582 and 137 cm/s.  Per sonographic criteria, degree of stenosis in the right internal  carotid artery is greater than or equal to 70%/near occlusion..  Flow in the right vertebral artery is antegrade.      Left side:   On the grayscale images, there is mixed plaque at the carotid  bifurcation extending into the internal carotid artery.  Spectral waveform analysis was performed. Peak systolic and diastolic   velocities in the left internal carotid artery are 147 and 29 cm/s.  Per sonographic criteria, degree of stenosis in the left internal  carotid artery is 50-69%.  Flow in the left vertebral artery is antegrade.          IMPRESSION: Per sonographic criteria, there is a near occlusion of the  right internal carotid artery. There is a 50-69% stenosis of the left  internal carotid artery.     Recommend further evaluation by vascular surgery.     Degree of stenosis measured relative to the diameter of the normal  internal carotid artery per NASCET or NASCET type criteria     JONE SMITH MD      This is an 82-year-old gentleman with an ultrasound suggestive of a High gradge right carotid artery stenosis and 50-69% left carotid artery stenosuis that is asymptomatic.  The plan at this time is to get a CTA to confirm the degree of stenosis and then proceed based on those findings.  He will remain on his aspirin in the meantime.  He knows to be seen should he develop any symptoms.  He will follow-up with me after his CTA.    Phillip Ma MD, FACS

## 2018-11-16 ENCOUNTER — OFFICE VISIT (OUTPATIENT)
Dept: SURGERY | Facility: CLINIC | Age: 83
End: 2018-11-16
Payer: COMMERCIAL

## 2018-11-16 VITALS
SYSTOLIC BLOOD PRESSURE: 122 MMHG | WEIGHT: 187.3 LBS | HEART RATE: 55 BPM | BODY MASS INDEX: 26.12 KG/M2 | DIASTOLIC BLOOD PRESSURE: 60 MMHG

## 2018-11-16 DIAGNOSIS — I65.21 CAROTID STENOSIS, ASYMPTOMATIC, RIGHT: Primary | ICD-10-CM

## 2018-11-16 PROCEDURE — 99214 OFFICE O/P EST MOD 30 MIN: CPT | Performed by: SPECIALIST

## 2018-11-16 RX ORDER — CLOPIDOGREL BISULFATE 75 MG/1
75 TABLET ORAL DAILY
Qty: 90 TABLET | Refills: 3 | Status: SHIPPED | OUTPATIENT
Start: 2018-11-16 | End: 2019-12-05

## 2018-11-16 NOTE — MR AVS SNAPSHOT
After Visit Summary   11/16/2018    Roque Mckeon    MRN: [id number]           Patient Information     Date Of Birth          1935        Visit Information        Provider Department      11/16/2018 11:00 AM Phillip Ma MD [facility]        Today's Diagnoses     Carotid stenosis, asymptomatic, right    -  1      Care Instructions    CAROTID ULTRASOUND SCHEDULED Monday MAY 6, 2019 at 1015 am at [facility]................call [phone] to change or cancel this appointment..          Follow-ups after your visit        Your next 10 appointments already scheduled     May 06, 2019 10:30 AM CDT   (Arrive by 10:15 AM)   US CAROTID BILATERAL with PHUS1   [facility]ound ([facility])    [address] [place] [id number] [phone]           How do I prepare for my exam? (Food and drink instructions) No Food and Drink Restrictions.  How do I prepare for my exam? (Other instructions) You do not need to do anything special to prepare for your exam.  What should I wear: Wear comfortable clothes.  How long does the exam take: Most ultrasounds take 30 to 60 minutes.  What should I bring: Bring a list of your medicines, including vitamins, minerals and over-the-counter drugs. It is safest to leave personal items at home.  Do I need a :  No  is needed.  What do I need to tell my doctor: Tell your doctor about any allergies you may have.  What should I do after the exam: No restrictions, You may resume normal activities.  What is this test: An ultrasound uses sound waves to make pictures of the body. Sound waves do not cause pain. The only discomfort may be the pressure of the wand against your skin or full bladder.  Who should I call with questions: If you have any questions, please call the Imaging Department where you will have your exam. Directions, parking instructions, and other information is available on our website,  [email or web address].              Future tests that were ordered for you today     Open Future Orders        Priority Expected Expires Ordered    US Carotid Bilateral Routine  11/16/2019 11/16/2018            Who to contact     If you have questions or need follow up information about today's clinic visit or your schedule please contact [facility] directly at [phone].  Normal or non-critical lab and imaging results will be communicated to you by MyChart, letter or phone within 4 business days after the clinic has received the results. If you do not hear from us within 7 days, please contact the clinic through MyChart or phone. If you have a critical or abnormal lab result, we will notify you by phone as soon as possible.  Submit refill requests through [employer] or call your pharmacy and they will forward the refill request to us. Please allow 3 business days for your refill to be completed.          Additional Information About Your Visit        Care EveryWhere ID     This is your Care EveryWhere ID. This could be used by other organizations to access your [facility] medical records  [id number]        Your Vitals Were     Pulse BMI (Body Mass Index)                55 26.12 kg/m2           Blood Pressure from Last 3 Encounters:   11/16/18 122/60   11/05/18 112/60   10/29/18 122/72    Weight from Last 3 Encounters:   11/16/18 85 kg (187 lb 4.8 oz)   11/05/18 84.4 kg (186 lb)   10/29/18 85 kg (187 lb 6.4 oz)               Primary Care Provider Office Phone # Fax #    Adam Marc PA-C [phone] [id number] [facility] [address]        Equal Access to Services     [facility]: Hadii aad ku hadasho Soomaali, waaxda luqadaha, qaybta kaalmada adeegyada, serena farfan. So [facility] [phone].    ATENCIÓN: Si habla español, tiene a appiah disposición servicios gratuitos de asistencia lingüística. Llame al [phone].    We comply with applicable  federal civil rights laws and [place] laws. We do not discriminate on the basis of race, color, national origin, age, disability, sex, sexual orientation, or gender identity.            Thank you!     Thank you for choosing [facility]  for your care. Our goal is always to provide you with excellent care. Hearing back from our patients is one way we can continue to improve our services. Please take a few minutes to complete the written survey that you may receive in the mail after your visit with us. Thank you!             Your Updated Medication List - Protect others around you: Learn how to safely use, store and throw away your medicines at www.disposemymeds.org.          This list is accurate as of 11/16/18 11:10 AM.  Always use your most recent med list.                   Brand Name Dispense Instructions for use Diagnosis    ACE NOT PRESCRIBED (INTENTIONAL)      by Other route continuous prn.        ACE/ARB/ARNI NOT PRESCRIBED (INTENTIONAL)      Please choose reason not prescribed, below    Type 2 diabetes mellitus without complication, without long-term current use of insulin (H)       allopurinol 300 MG tablet    ZYLOPRIM    90 tablet    Take 1 tablet by mouth daily.    Calculus of kidney       * aspirin 81 MG tablet     0    1 tab po QD (Once per day)    Type II or unspecified type diabetes mellitus without mention of complication, not stated as uncontrolled       * aspirin 325 MG EC tablet     90 tablet    Take 1 tablet (325 mg) by mouth daily    Type 2 diabetes mellitus without complication, without long-term current use of insulin (H), Need for prophylactic vaccination and inoculation against influenza, Abnormal finding on screening procedure, Bruit of right carotid artery       blood glucose monitoring lancets     1 each    test 1 times daily    Type 2 diabetes mellitus without complication, without long-term current use of insulin (H)       * Blood Glucose Monitoring Suppl Misc     100  each    1 strip daily Or as directed.    Type 2 diabetes, HbA1c goal < 7% (H)       * blood glucose monitoring meter device kit    no brand specified    1 kit    Use to test blood sugar 1 times daily or as directed.    Type 2 diabetes, HbA1c goal < 7% (H)       blood glucose monitoring test strip    ONETOUCH ULTRA    300 strip    Test blood sugar 3 times daily    Type 2 diabetes mellitus without complication, without long-term current use of insulin (H)       fish oil-omega-3 fatty acids 1000 MG capsule      1 capsule 2 times per day    Type II or unspecified type diabetes mellitus without mention of complication, not stated as uncontrolled       metFORMIN 1000 MG tablet    GLUCOPHAGE    90 tablet    Take 1 tablet (1,000 mg) by mouth daily (with dinner)        MULTIVITAMIN & MINERAL PO      Take 1 tablet by mouth daily        PARoxetine 30 MG tablet    PAXIL    90 tablet    Take 1 tablet (30 mg) by mouth At Bedtime    Anxiety and depression       potassium citrate 10 MEQ (1080 MG) CR tablet    UROCIT-K    270 tablet    Take 1 tablet by mouth 3 times daily (with meals).    Calculus of kidney       triamcinolone 0.1 % cream    KENALOG    30 g    Apply sparingly to affected area three times daily for 14 days.    Bug bites, initial encounter, Rash and nonspecific skin eruption       ZOCOR 80 MG tablet   Generic drug:  simvastatin     90 tablet    Take 0.5 tablets (40 mg) by mouth daily    Hyperlipidemia LDL goal <100       * Notice:  This list has 4 medication(s) that are the same as other medications prescribed for you. Read the directions carefully, and ask your doctor or other care provider to review them with you.

## 2018-11-16 NOTE — PATIENT INSTRUCTIONS
CAROTID ULTRASOUND SCHEDULED Monday MAY 6, 2019 at 1015 am at [facility]................call [phone] to change or cancel this appointment..

## 2018-11-16 NOTE — NURSING NOTE
"Roque Mckeon is a 82 year old male who presents for:  Chief Complaint   Patient presents with     RECHECK     F/U on imaging        Initial Vitals:  /60 (BP Location: Right arm, Patient Position: Sitting, Cuff Size: Adult Large)  Pulse 55  Wt 85 kg (187 lb 4.8 oz)  BMI 26.12 kg/m2 Estimated body mass index is 26.12 kg/(m^2) as calculated from the following:    Height as of 10/29/18: 1.803 m (5' 11\").    Weight as of this encounter: 85 kg (187 lb 4.8 oz).. Body surface area is 2.06 meters squared. BP completed using cuff size: large  Data Unavailable    Do you feel safe in your environment?  Yes  Do you need any refills today? No    Nursing Comments:         Isabelle Michael  "

## 2018-11-16 NOTE — LETTER
11/16/2018         RE: Roque Mckeon  [address]   [facility] [address]        Dear Colleague,    Thank you for referring your patient, Roque Mckeon, to the [facility]. Please see a copy of my visit note below.    F/U for carotid stenosis        Subjective:  Patient is an 82-year-old white male who decided to go for [facility] screening due to his mother dying of a stroke around the same age he is at now.  The screening suggested a right carotid artery stenosis for which she is now referred.  He denies any prior stroke, TIA, amaurosis fugax, history of aneurysm, claudication, nonhealing wounds or rest pain.  He has never smoked.   He had an US that suggested bilateral carotid artery stenosis.  He had a confirmatory CTA for which he now follows up.       Objective:  B/P: 122/60, T: Data Unavailable, P: 55, R: Data Unavailable  Neck: Supple, No JVD, no bruits  Abd: Soft, non tender, non distended, no bruits or masses  Ext; Warm, no edema, Rt - (+)FEM/DP pulses, Lt (+)FEM/PT pulses      US -   US CAROTID BILATERAL 10/31/2018 11:34 AM     HISTORY: Right carotid bruit.     COMPARISON: None.     FINDINGS:      Right side:   On the grayscale images, there is severe mixed plaque at the carotid  bifurcation extending into the internal carotid artery.  Spectral waveform analysis was performed. Peak systolic and diastolic  velocities in the right internal carotid artery are 582 and 137 cm/s.  Per sonographic criteria, degree of stenosis in the right internal  carotid artery is greater than or equal to 70%/near occlusion..  Flow in the right vertebral artery is antegrade.      Left side:   On the grayscale images, there is mixed plaque at the carotid  bifurcation extending into the internal carotid artery.  Spectral waveform analysis was performed. Peak systolic and diastolic   velocities in the left internal carotid artery are 147 and 29 cm/s.  Per sonographic criteria, degree of stenosis in the left  internal  carotid artery is 50-69%.  Flow in the left vertebral artery is antegrade.          IMPRESSION: Per sonographic criteria, there is a near occlusion of the  right internal carotid artery. There is a 50-69% stenosis of the left  internal carotid artery.     Recommend further evaluation by vascular surgery.     Degree of stenosis measured relative to the diameter of the normal  internal carotid artery per NASCET or NASCET type criteria     JONE SMITH MD      CTA -   CTA NECK WITH CONTRAST November 5, 2018 10:45 AM      HISTORY: Carotid stenosis, asymptomatic, right.     TECHNIQUE: Precontrast localizing scans were followed by CT  angiography with an injection of 70mL, Isovue 370 IV contrast with  scans through the neck.  Images were transferred to a separate 3-D  workstation where multiplanar reformations and 3-D images were  created. Estimates of carotid stenoses are made relative to the distal  internal carotid artery diameters except as noted. Radiation dose for  this scan was reduced using automated exposure control, adjustment of  the mA and/or kV according to patient size, or iterative  reconstruction technique.     COMPARISON: Ultrasound exam dated 10/31/2018.     FINDINGS: Estimates of stenosis at the carotid bifurcations are  relative to the distal internal carotids.     Arch: Normal Anatomy.     Neck:  Right Carotid: The right common carotid artery is normal. Calcified  and noncalcified atherosclerotic plaque is seen at the right carotid  bifurcation. The residual lumen at the point of maximum stenosis is  approximately 1.5 mm. The more distal internal carotid measures about  5.3 mm. The stenosis was calculated at about 72%. The right internal  carotid artery is negative.      Left Carotid: The left common carotid artery is unremarkable.  Atherosclerotic plaque is seen at the left carotid bifurcation. The  minimum diameter was calculated at 4.2 mm. The more distal internal  carotid measures  about 6.2 mm. The stenosis was calculated at 31%.   The left internal carotid is negative.       Vertebrals: The vertebral arteries are normal.            IMPRESSION:   1. High-grade, 72% stenosis right carotid bifurcation.  2. Less than 50% stenosis left carotid bifurcation.     SYED PAINTING MD        Assessment/Plan:  This is an 82-year-old gentleman with an ultrasound suggestive of a High grade right carotid artery stenosis and 50-69% left carotid artery stenosuis that is asymptomatic.  CTA revealed only a 72% stenosis on the right a <50 on the left.   He is asymptomatic at this time.  Discussed the role of endarterectomy versus medical management in patients in this age group.  As he is asymptomatic at this time I recommended we watch him out to 80% stenosis and he is in agreement with that plan.  He is on a statin and his blood sugars are controlled and he does not smoke.  The plan at time is for medical management with the addition of Plavix to repeat ultrasound in 6 months.  He will follow-up with me after his ultrasound.  Knows to be seen sooner should he develop symptoms.  He can reduce his baby aspirin to once a day once he starts Plavix.      Phillip Ma MD, FACS      Again, thank you for allowing me to participate in the care of your patient.        Sincerely,        Phillip Ma MD

## 2018-11-16 NOTE — PROGRESS NOTES
F/U for carotid stenosis        Subjective:  Patient is an 82-year-old white male who decided to go for [facility] screening due to his mother dying of a stroke around the same age he is at now.  The screening suggested a right carotid artery stenosis for which she is now referred.  He denies any prior stroke, TIA, amaurosis fugax, history of aneurysm, claudication, nonhealing wounds or rest pain.  He has never smoked.   He had an US that suggested bilateral carotid artery stenosis.  He had a confirmatory CTA for which he now follows up.       Objective:  B/P: 122/60, T: Data Unavailable, P: 55, R: Data Unavailable  Neck: Supple, No JVD, no bruits  Abd: Soft, non tender, non distended, no bruits or masses  Ext; Warm, no edema, Rt - (+)FEM/DP pulses, Lt (+)FEM/PT pulses      US -   US CAROTID BILATERAL 10/31/2018 11:34 AM     HISTORY: Right carotid bruit.     COMPARISON: None.     FINDINGS:      Right side:   On the grayscale images, there is severe mixed plaque at the carotid  bifurcation extending into the internal carotid artery.  Spectral waveform analysis was performed. Peak systolic and diastolic  velocities in the right internal carotid artery are 582 and 137 cm/s.  Per sonographic criteria, degree of stenosis in the right internal  carotid artery is greater than or equal to 70%/near occlusion..  Flow in the right vertebral artery is antegrade.      Left side:   On the grayscale images, there is mixed plaque at the carotid  bifurcation extending into the internal carotid artery.  Spectral waveform analysis was performed. Peak systolic and diastolic   velocities in the left internal carotid artery are 147 and 29 cm/s.  Per sonographic criteria, degree of stenosis in the left internal  carotid artery is 50-69%.  Flow in the left vertebral artery is antegrade.          IMPRESSION: Per sonographic criteria, there is a near occlusion of the  right internal carotid artery. There is a 50-69% stenosis of the  left  internal carotid artery.     Recommend further evaluation by vascular surgery.     Degree of stenosis measured relative to the diameter of the normal  internal carotid artery per NASCET or NASCET type criteria     JONE SMITH MD      CTA -   CTA NECK WITH CONTRAST November 5, 2018 10:45 AM      HISTORY: Carotid stenosis, asymptomatic, right.     TECHNIQUE: Precontrast localizing scans were followed by CT  angiography with an injection of 70mL, Isovue 370 IV contrast with  scans through the neck.  Images were transferred to a separate 3-D  workstation where multiplanar reformations and 3-D images were  created. Estimates of carotid stenoses are made relative to the distal  internal carotid artery diameters except as noted. Radiation dose for  this scan was reduced using automated exposure control, adjustment of  the mA and/or kV according to patient size, or iterative  reconstruction technique.     COMPARISON: Ultrasound exam dated 10/31/2018.     FINDINGS: Estimates of stenosis at the carotid bifurcations are  relative to the distal internal carotids.     Arch: Normal Anatomy.     Neck:  Right Carotid: The right common carotid artery is normal. Calcified  and noncalcified atherosclerotic plaque is seen at the right carotid  bifurcation. The residual lumen at the point of maximum stenosis is  approximately 1.5 mm. The more distal internal carotid measures about  5.3 mm. The stenosis was calculated at about 72%. The right internal  carotid artery is negative.      Left Carotid: The left common carotid artery is unremarkable.  Atherosclerotic plaque is seen at the left carotid bifurcation. The  minimum diameter was calculated at 4.2 mm. The more distal internal  carotid measures about 6.2 mm. The stenosis was calculated at 31%.   The left internal carotid is negative.       Vertebrals: The vertebral arteries are normal.            IMPRESSION:   1. High-grade, 72% stenosis right carotid bifurcation.  2. Less  than 50% stenosis left carotid bifurcation.     SYED PAINTING MD        Assessment/Plan:  This is an 82-year-old gentleman with an ultrasound suggestive of a High grade right carotid artery stenosis and 50-69% left carotid artery stenosuis that is asymptomatic.  CTA revealed only a 72% stenosis on the right a <50 on the left.   He is asymptomatic at this time.  Discussed the role of endarterectomy versus medical management in patients in this age group.  As he is asymptomatic at this time I recommended we watch him out to 80% stenosis and he is in agreement with that plan.  He is on a statin and his blood sugars are controlled and he does not smoke.  The plan at time is for medical management with the addition of Plavix to repeat ultrasound in 6 months.  He will follow-up with me after his ultrasound.  Knows to be seen sooner should he develop symptoms.  He can reduce his baby aspirin to once a day once he starts Plavix.      Phillip Ma MD, FACS

## 2018-11-19 LAB
CREAT BLD-MCNC: 1 MG/DL (ref 0.66–1.25)
GFR SERPL CREATININE-BSD FRML MDRD: 72 ML/MIN/1.7M2

## 2018-12-31 DIAGNOSIS — E11.9 TYPE 2 DIABETES MELLITUS WITHOUT COMPLICATION, WITHOUT LONG-TERM CURRENT USE OF INSULIN (H): ICD-10-CM

## 2019-01-02 RX ORDER — LANCETS
EACH MISCELLANEOUS
Qty: 100 EACH | Refills: 3 | Status: SHIPPED | OUTPATIENT
Start: 2019-01-02 | End: 2019-10-15

## 2019-01-02 NOTE — TELEPHONE ENCOUNTER
Lanets    Prescription approved per [facility] Refill Protocol.    Teri Rodrigez, RN, BSN

## 2019-04-17 DIAGNOSIS — F41.9 ANXIETY AND DEPRESSION: ICD-10-CM

## 2019-04-17 DIAGNOSIS — F32.A ANXIETY AND DEPRESSION: ICD-10-CM

## 2019-04-18 RX ORDER — PAROXETINE 30 MG/1
TABLET, FILM COATED ORAL
Qty: 90 TABLET | Refills: 1 | Status: SHIPPED | OUTPATIENT
Start: 2019-04-18 | End: 2019-10-15

## 2019-04-18 NOTE — TELEPHONE ENCOUNTER
Paxil  Routing refill request to provider for review/approval because:  Appears to be a break in medication - should have been out around 1/2019    Isabelle Paredes, RN, BSN

## 2019-05-29 DIAGNOSIS — E11.9 TYPE 2 DIABETES MELLITUS WITHOUT COMPLICATION, WITHOUT LONG-TERM CURRENT USE OF INSULIN (H): ICD-10-CM

## 2019-05-30 ENCOUNTER — TRANSFERRED RECORDS (OUTPATIENT)
Dept: HEALTH INFORMATION MANAGEMENT | Facility: CLINIC | Age: 84
End: 2019-05-30

## 2019-05-30 LAB
ALBUMIN (URINE) MG/SPEC: 13.1 MG/DL
ALT SERPL-CCNC: 20 U/L (ref 0–55)
AST SERPL-CCNC: 26 U/L (ref 5–40)
CHOLEST SERPL-MCNC: 106 MG/DL
CREAT SERPL-MCNC: 1.1 MG/DL (ref 0.5–1.5)
CREATININE (URINE): 119.7 MG/DL
GFR SERPL CREATININE-BSD FRML MDRD: >60 ML/MIN/1.73M2
GLUCOSE SERPL-MCNC: 160 MG/DL (ref 70–100)
HBA1C MFR BLD: 8.5 % (ref 4.4–6.5)
HDLC SERPL-MCNC: 49 MG/DL
LDLC SERPL CALC-MCNC: 81 MG/DL
POTASSIUM SERPL-SCNC: 4.8 MMOL/L (ref 3.5–5)
TRIGL SERPL-MCNC: 152 MG/DL

## 2019-05-30 NOTE — TELEPHONE ENCOUNTER
Test strips  Medication is being filled for 1 time refill only due to:  Patient needs to be seen because DM OV.    Isabelle Paredes, RN, BSN

## 2019-06-17 ENCOUNTER — HOSPITAL ENCOUNTER (OUTPATIENT)
Dept: ULTRASOUND IMAGING | Facility: CLINIC | Age: 84
Discharge: HOME | End: 2019-06-17
Attending: SPECIALIST | Admitting: SPECIALIST
Payer: COMMERCIAL

## 2019-06-17 DIAGNOSIS — I65.21 CAROTID STENOSIS, ASYMPTOMATIC, RIGHT: ICD-10-CM

## 2019-06-17 PROCEDURE — 93880 EXTRACRANIAL BILAT STUDY: CPT

## 2019-06-18 ENCOUNTER — TELEPHONE (OUTPATIENT)
Dept: SURGERY | Facility: OTHER | Age: 84
End: 2019-06-18

## 2019-06-27 ENCOUNTER — OFFICE VISIT (OUTPATIENT)
Dept: SURGERY | Facility: CLINIC | Age: 84
End: 2019-06-27
Payer: COMMERCIAL

## 2019-06-27 VITALS
DIASTOLIC BLOOD PRESSURE: 64 MMHG | OXYGEN SATURATION: 97 % | WEIGHT: 183.9 LBS | SYSTOLIC BLOOD PRESSURE: 132 MMHG | HEART RATE: 91 BPM | BODY MASS INDEX: 25.65 KG/M2

## 2019-06-27 DIAGNOSIS — I65.21 CAROTID STENOSIS, ASYMPTOMATIC, RIGHT: Primary | ICD-10-CM

## 2019-06-27 DIAGNOSIS — I65.22 LEFT CAROTID ARTERY STENOSIS: ICD-10-CM

## 2019-06-27 PROCEDURE — 99213 OFFICE O/P EST LOW 20 MIN: CPT | Performed by: SPECIALIST

## 2019-06-27 NOTE — PROGRESS NOTES
F/U for carotid US -       Subjective:  Patient is an 83-year-old white male who decided to go for [facility] screening due to his mother dying of a stroke around the same age he is at now.  The screening suggested a right carotid artery stenosis for which she is now referred.  He denies any prior stroke, TIA, amaurosis fugax, history of aneurysm, claudication, nonhealing wounds or rest pain.  He has never smoked.   He had an US that suggested bilateral carotid artery stenosis.  He had a confirmatory CTA that revealed a 72% right carotid stenosis. It was decided to manage it non operatively/    He had his 6 month US for which he now follows up.  No neuro sx since last visit.  Currently on plavix     Objective:  B/P: 132/64, T: Data Unavailable, P: 91, R: Data Unavailable  Neck: Supple, No JVD, no bruits  Abd: Soft, non tender, non distended, no bruits or masses  Ext; Warm, no edema, Rt - (+)FEM/DP pulses, Lt (+)FEM/PT pulses      US -   US CAROTID BILATERAL 10/31/2018 11:34 AM     HISTORY: Right carotid bruit.     COMPARISON: None.     FINDINGS:      Right side:   On the grayscale images, there is severe mixed plaque at the carotid  bifurcation extending into the internal carotid artery.  Spectral waveform analysis was performed. Peak systolic and diastolic  velocities in the right internal carotid artery are 582 and 137 cm/s.  Per sonographic criteria, degree of stenosis in the right internal  carotid artery is greater than or equal to 70%/near occlusion..  Flow in the right vertebral artery is antegrade.      Left side:   On the grayscale images, there is mixed plaque at the carotid  bifurcation extending into the internal carotid artery.  Spectral waveform analysis was performed. Peak systolic and diastolic   velocities in the left internal carotid artery are 147 and 29 cm/s.  Per sonographic criteria, degree of stenosis in the left internal  carotid artery is 50-69%.  Flow in the left vertebral artery is  antegrade.          IMPRESSION: Per sonographic criteria, there is a near occlusion of the  right internal carotid artery. There is a 50-69% stenosis of the left  internal carotid artery.     Recommend further evaluation by vascular surgery.     Degree of stenosis measured relative to the diameter of the normal  internal carotid artery per NASCET or NASCET type criteria     JONE SMITH MD      CTA -   CTA NECK WITH CONTRAST November 5, 2018 10:45 AM      HISTORY: Carotid stenosis, asymptomatic, right.     TECHNIQUE: Precontrast localizing scans were followed by CT  angiography with an injection of 70mL, Isovue 370 IV contrast with  scans through the neck.  Images were transferred to a separate 3-D  workstation where multiplanar reformations and 3-D images were  created. Estimates of carotid stenoses are made relative to the distal  internal carotid artery diameters except as noted. Radiation dose for  this scan was reduced using automated exposure control, adjustment of  the mA and/or kV according to patient size, or iterative  reconstruction technique.     COMPARISON: Ultrasound exam dated 10/31/2018.     FINDINGS: Estimates of stenosis at the carotid bifurcations are  relative to the distal internal carotids.     Arch: Normal Anatomy.     Neck:  Right Carotid: The right common carotid artery is normal. Calcified  and noncalcified atherosclerotic plaque is seen at the right carotid  bifurcation. The residual lumen at the point of maximum stenosis is  approximately 1.5 mm. The more distal internal carotid measures about  5.3 mm. The stenosis was calculated at about 72%. The right internal  carotid artery is negative.      Left Carotid: The left common carotid artery is unremarkable.  Atherosclerotic plaque is seen at the left carotid bifurcation. The  minimum diameter was calculated at 4.2 mm. The more distal internal  carotid measures about 6.2 mm. The stenosis was calculated at 31%.   The left internal  carotid is negative.       Vertebrals: The vertebral arteries are normal.            IMPRESSION:   1. High-grade, 72% stenosis right carotid bifurcation.  2. Less than 50% stenosis left carotid bifurcation.     SYED PAINTING MD    US -   BILATERAL CAROTID ULTRASOUND June 17, 2019 8:34 AM      HISTORY: Carotid stenosis, asymptomatic, right.     COMPARISON: October 31, 2018.     RIGHT CAROTID FINDINGS: There is a large degree of atherosclerotic  plaque at the carotid bifurcation and proximal internal carotid  artery, with relatively smooth margins. Majority of plaque appears to  be soft in consistency.  Right ICA PSV:  534  cm/sec.  Right ICA EDV:  154 cm/sec.  Right ICA/CCA PSV Ratio:  5.9.    These indicate greater than 70% diameter stenosis of the right ICA.    Right Vertebral: Antegrade flow.   Right ECA: Antegrade flow.   Incidental note is made of a solid hypoechoic nodule in the right  thyroid lobe measuring 1.5 x 1.2 x 1.4 cm.     LEFT CAROTID FINDINGS: There is mild atherosclerotic plaque at the  carotid bifurcation and proximal internal carotid artery with  relatively smooth margins.  Left ICA PSV:  165  cm/sec.  Left ICA EDV:  31 cm/sec.  Left ICA/CCA PSV Ratio:  1.3.    These indicate  50 - 69%  diameter stenosis of the left ICA.    Left Vertebral: Antegrade flow.   Left ECA: Antegrade flow.      Causes of Decreased Accuracy:   None.                                                                       IMPRESSION:    1. Greater than 70% relatively unchanged diameter stenosis of the  right internal carotid artery relative to the distal internal carotid  artery diameter. Given such a significant degree of velocity  elevation, the degree of stenosis is trending toward near occlusion,  though again this is relatively unchanged from previous exam.  2. 50-69% unchanged diameter stenosis of the left internal carotid  artery relative to the distal internal carotid artery diameter.   3. Solid hypoechoic right thyroid  lobe nodule is 1.5 x 1.2 x 1.4 cm.     DINO DIAZ MD         Assessment/Plan:  This is an 83 year-old gentleman with an ultrasound suggestive of a High grade right carotid artery stenosis and 50-69% left carotid artery stenosuis that is asymptomatic.  CTA revealed only a 72% stenosis on the right a <50 on the left.   He is asymptomatic at this time.   His right-sided stenosis is stable based on velocity criteria.      I discussed the role of endarterectomy versus medical management in patients in this age group.  As he is asymptomatic at this time I recommended we watch him out to 80% stenosis and he is in agreement with that plan.  He is on a statin and his blood sugars are controlled and he does not smoke.  He is in agreement with that plan    The plan at time is for medical management with the addition of Plavix to repeat ultrasound in 6 months.  He will follow-up with me after his ultrasound.  Knows to be seen sooner should he develop symptoms.     Phillip Ma MD, FACS

## 2019-06-27 NOTE — LETTER
6/27/2019         RE: Roque Mckeon  [address]   [place] [address]        Dear Colleague,    Thank you for referring your patient, Roque Mckeon, to the [facility]. Please see a copy of my visit note below.    F/U for carotid US -       Subjective:  Patient is an 83-year-old white male who decided to go for [facility] screening due to his mother dying of a stroke around the same age he is at now.  The screening suggested a right carotid artery stenosis for which she is now referred.  He denies any prior stroke, TIA, amaurosis fugax, history of aneurysm, claudication, nonhealing wounds or rest pain.  He has never smoked.   He had an US that suggested bilateral carotid artery stenosis.  He had a confirmatory CTA that revealed a 72% right carotid stenosis. It was decided to manage it non operatively/    He had his 6 month US for which he now follows up.  No neuro sx since last visit.  Currently on plavix     Objective:  B/P: 132/64, T: Data Unavailable, P: 91, R: Data Unavailable  Neck: Supple, No JVD, no bruits  Abd: Soft, non tender, non distended, no bruits or masses  Ext; Warm, no edema, Rt - (+)FEM/DP pulses, Lt (+)FEM/PT pulses      US -   US CAROTID BILATERAL 10/31/2018 11:34 AM     HISTORY: Right carotid bruit.     COMPARISON: None.     FINDINGS:      Right side:   On the grayscale images, there is severe mixed plaque at the carotid  bifurcation extending into the internal carotid artery.  Spectral waveform analysis was performed. Peak systolic and diastolic  velocities in the right internal carotid artery are 582 and 137 cm/s.  Per sonographic criteria, degree of stenosis in the right internal  carotid artery is greater than or equal to 70%/near occlusion..  Flow in the right vertebral artery is antegrade.      Left side:   On the grayscale images, there is mixed plaque at the carotid  bifurcation extending into the internal carotid artery.  Spectral waveform analysis was performed.  Peak systolic and diastolic   velocities in the left internal carotid artery are 147 and 29 cm/s.  Per sonographic criteria, degree of stenosis in the left internal  carotid artery is 50-69%.  Flow in the left vertebral artery is antegrade.          IMPRESSION: Per sonographic criteria, there is a near occlusion of the  right internal carotid artery. There is a 50-69% stenosis of the left  internal carotid artery.     Recommend further evaluation by vascular surgery.     Degree of stenosis measured relative to the diameter of the normal  internal carotid artery per NASCET or NASCET type criteria     JONE SMITH MD      CTA -   CTA NECK WITH CONTRAST November 5, 2018 10:45 AM      HISTORY: Carotid stenosis, asymptomatic, right.     TECHNIQUE: Precontrast localizing scans were followed by CT  angiography with an injection of 70mL, Isovue 370 IV contrast with  scans through the neck.  Images were transferred to a separate 3-D  workstation where multiplanar reformations and 3-D images were  created. Estimates of carotid stenoses are made relative to the distal  internal carotid artery diameters except as noted. Radiation dose for  this scan was reduced using automated exposure control, adjustment of  the mA and/or kV according to patient size, or iterative  reconstruction technique.     COMPARISON: Ultrasound exam dated 10/31/2018.     FINDINGS: Estimates of stenosis at the carotid bifurcations are  relative to the distal internal carotids.     Arch: Normal Anatomy.     Neck:  Right Carotid: The right common carotid artery is normal. Calcified  and noncalcified atherosclerotic plaque is seen at the right carotid  bifurcation. The residual lumen at the point of maximum stenosis is  approximately 1.5 mm. The more distal internal carotid measures about  5.3 mm. The stenosis was calculated at about 72%. The right internal  carotid artery is negative.      Left Carotid: The left common carotid artery is  unremarkable.  Atherosclerotic plaque is seen at the left carotid bifurcation. The  minimum diameter was calculated at 4.2 mm. The more distal internal  carotid measures about 6.2 mm. The stenosis was calculated at 31%.   The left internal carotid is negative.       Vertebrals: The vertebral arteries are normal.            IMPRESSION:   1. High-grade, 72% stenosis right carotid bifurcation.  2. Less than 50% stenosis left carotid bifurcation.     SYED PAINTING MD    US -   BILATERAL CAROTID ULTRASOUND June 17, 2019 8:34 AM      HISTORY: Carotid stenosis, asymptomatic, right.     COMPARISON: October 31, 2018.     RIGHT CAROTID FINDINGS: There is a large degree of atherosclerotic  plaque at the carotid bifurcation and proximal internal carotid  artery, with relatively smooth margins. Majority of plaque appears to  be soft in consistency.  Right ICA PSV:  534  cm/sec.  Right ICA EDV:  154 cm/sec.  Right ICA/CCA PSV Ratio:  5.9.    These indicate greater than 70% diameter stenosis of the right ICA.    Right Vertebral: Antegrade flow.   Right ECA: Antegrade flow.   Incidental note is made of a solid hypoechoic nodule in the right  thyroid lobe measuring 1.5 x 1.2 x 1.4 cm.     LEFT CAROTID FINDINGS: There is mild atherosclerotic plaque at the  carotid bifurcation and proximal internal carotid artery with  relatively smooth margins.  Left ICA PSV:  165  cm/sec.  Left ICA EDV:  31 cm/sec.  Left ICA/CCA PSV Ratio:  1.3.    These indicate  50 - 69%  diameter stenosis of the left ICA.    Left Vertebral: Antegrade flow.   Left ECA: Antegrade flow.      Causes of Decreased Accuracy:   None.                                                                       IMPRESSION:    1. Greater than 70% relatively unchanged diameter stenosis of the  right internal carotid artery relative to the distal internal carotid  artery diameter. Given such a significant degree of velocity  elevation, the degree of stenosis is trending toward near  occlusion,  though again this is relatively unchanged from previous exam.  2. 50-69% unchanged diameter stenosis of the left internal carotid  artery relative to the distal internal carotid artery diameter.   3. Solid hypoechoic right thyroid lobe nodule is 1.5 x 1.2 x 1.4 cm.     DINO DIAZ MD         Assessment/Plan:  This is an 83 year-old gentleman with an ultrasound suggestive of a High grade right carotid artery stenosis and 50-69% left carotid artery stenosuis that is asymptomatic.  CTA revealed only a 72% stenosis on the right a <50 on the left.   He is asymptomatic at this time.   His right-sided stenosis is stable based on velocity criteria.      I discussed the role of endarterectomy versus medical management in patients in this age group.  As he is asymptomatic at this time I recommended we watch him out to 80% stenosis and he is in agreement with that plan.  He is on a statin and his blood sugars are controlled and he does not smoke.  He is in agreement with that plan    The plan at time is for medical management with the addition of Plavix to repeat ultrasound in 6 months.  He will follow-up with me after his ultrasound.  Knows to be seen sooner should he develop symptoms.     Phillip Ma MD, FACS      Again, thank you for allowing me to participate in the care of your patient.        Sincerely,        Phillip Ma MD

## 2019-10-15 ENCOUNTER — OFFICE VISIT (OUTPATIENT)
Dept: FAMILY MEDICINE | Facility: OTHER | Age: 84
End: 2019-10-15
Payer: COMMERCIAL

## 2019-10-15 VITALS
HEIGHT: 71 IN | RESPIRATION RATE: 16 BRPM | SYSTOLIC BLOOD PRESSURE: 126 MMHG | BODY MASS INDEX: 26.01 KG/M2 | HEART RATE: 84 BPM | DIASTOLIC BLOOD PRESSURE: 72 MMHG | TEMPERATURE: 98.8 F | WEIGHT: 185.8 LBS

## 2019-10-15 DIAGNOSIS — F43.23 ADJUSTMENT DISORDER WITH MIXED ANXIETY AND DEPRESSED MOOD: ICD-10-CM

## 2019-10-15 DIAGNOSIS — F41.9 ANXIETY AND DEPRESSION: ICD-10-CM

## 2019-10-15 DIAGNOSIS — Z79.899 ENCOUNTER FOR LONG-TERM CURRENT USE OF MEDICATION: ICD-10-CM

## 2019-10-15 DIAGNOSIS — D50.8 OTHER IRON DEFICIENCY ANEMIA: ICD-10-CM

## 2019-10-15 DIAGNOSIS — F32.5 MAJOR DEPRESSION IN COMPLETE REMISSION (H): ICD-10-CM

## 2019-10-15 DIAGNOSIS — E78.5 HYPERLIPIDEMIA LDL GOAL <100: ICD-10-CM

## 2019-10-15 DIAGNOSIS — F32.A ANXIETY AND DEPRESSION: ICD-10-CM

## 2019-10-15 DIAGNOSIS — E11.9 TYPE 2 DIABETES MELLITUS WITHOUT COMPLICATION, WITHOUT LONG-TERM CURRENT USE OF INSULIN (H): Primary | ICD-10-CM

## 2019-10-15 LAB
ALBUMIN SERPL-MCNC: 3.8 G/DL (ref 3.4–5)
ALP SERPL-CCNC: 66 U/L (ref 40–150)
ALT SERPL W P-5'-P-CCNC: 26 U/L (ref 0–70)
ANION GAP SERPL CALCULATED.3IONS-SCNC: 5 MMOL/L (ref 3–14)
AST SERPL W P-5'-P-CCNC: 19 U/L (ref 0–45)
BILIRUB SERPL-MCNC: 0.3 MG/DL (ref 0.2–1.3)
BUN SERPL-MCNC: 27 MG/DL (ref 7–30)
CALCIUM SERPL-MCNC: 8.7 MG/DL (ref 8.5–10.1)
CHLORIDE SERPL-SCNC: 105 MMOL/L (ref 94–109)
CO2 SERPL-SCNC: 27 MMOL/L (ref 20–32)
CREAT SERPL-MCNC: 0.9 MG/DL (ref 0.66–1.25)
ERYTHROCYTE [DISTWIDTH] IN BLOOD BY AUTOMATED COUNT: 14.6 % (ref 10–15)
GFR SERPL CREATININE-BSD FRML MDRD: 79 ML/MIN/{1.73_M2}
GLUCOSE SERPL-MCNC: 138 MG/DL (ref 70–99)
HBA1C MFR BLD: 7.9 % (ref 0–5.6)
HCT VFR BLD AUTO: 38.8 % (ref 40–53)
HGB BLD-MCNC: 12.4 G/DL (ref 13.3–17.7)
IRON SATN MFR SERPL: 19 % (ref 15–46)
IRON SERPL-MCNC: 81 UG/DL (ref 35–180)
MCH RBC QN AUTO: 30.8 PG (ref 26.5–33)
MCHC RBC AUTO-ENTMCNC: 32 G/DL (ref 31.5–36.5)
MCV RBC AUTO: 96 FL (ref 78–100)
PLATELET # BLD AUTO: 195 10E9/L (ref 150–450)
POTASSIUM SERPL-SCNC: 4.7 MMOL/L (ref 3.4–5.3)
PROT SERPL-MCNC: 7.4 G/DL (ref 6.8–8.8)
RBC # BLD AUTO: 4.03 10E12/L (ref 4.4–5.9)
SODIUM SERPL-SCNC: 137 MMOL/L (ref 133–144)
TIBC SERPL-MCNC: 432 UG/DL (ref 240–430)
WBC # BLD AUTO: 4.1 10E9/L (ref 4–11)

## 2019-10-15 PROCEDURE — 85027 COMPLETE CBC AUTOMATED: CPT | Performed by: PHYSICIAN ASSISTANT

## 2019-10-15 PROCEDURE — 83550 IRON BINDING TEST: CPT | Performed by: PHYSICIAN ASSISTANT

## 2019-10-15 PROCEDURE — 80053 COMPREHEN METABOLIC PANEL: CPT | Performed by: PHYSICIAN ASSISTANT

## 2019-10-15 PROCEDURE — 99207 C FOOT EXAM  NO CHARGE: CPT | Performed by: PHYSICIAN ASSISTANT

## 2019-10-15 PROCEDURE — 36415 COLL VENOUS BLD VENIPUNCTURE: CPT | Performed by: PHYSICIAN ASSISTANT

## 2019-10-15 PROCEDURE — 83540 ASSAY OF IRON: CPT | Performed by: PHYSICIAN ASSISTANT

## 2019-10-15 PROCEDURE — 99214 OFFICE O/P EST MOD 30 MIN: CPT | Performed by: PHYSICIAN ASSISTANT

## 2019-10-15 PROCEDURE — 83036 HEMOGLOBIN GLYCOSYLATED A1C: CPT | Performed by: PHYSICIAN ASSISTANT

## 2019-10-15 RX ORDER — PAROXETINE 30 MG/1
TABLET, FILM COATED ORAL
Qty: 90 TABLET | Refills: 1 | Status: SHIPPED | OUTPATIENT
Start: 2019-10-15 | End: 2020-04-28

## 2019-10-15 RX ORDER — LANCETS
EACH MISCELLANEOUS
Qty: 100 EACH | Refills: 3 | Status: SHIPPED | OUTPATIENT
Start: 2019-10-15 | End: 2020-10-29

## 2019-10-15 ASSESSMENT — PATIENT HEALTH QUESTIONNAIRE - PHQ9
SUM OF ALL RESPONSES TO PHQ QUESTIONS 1-9: 1
SUM OF ALL RESPONSES TO PHQ QUESTIONS 1-9: 1
10. IF YOU CHECKED OFF ANY PROBLEMS, HOW DIFFICULT HAVE THESE PROBLEMS MADE IT FOR YOU TO DO YOUR WORK, TAKE CARE OF THINGS AT HOME, OR GET ALONG WITH OTHER PEOPLE: NOT DIFFICULT AT ALL

## 2019-10-15 ASSESSMENT — ANXIETY QUESTIONNAIRES
GAD7 TOTAL SCORE: 0
7. FEELING AFRAID AS IF SOMETHING AWFUL MIGHT HAPPEN: NOT AT ALL
6. BECOMING EASILY ANNOYED OR IRRITABLE: NOT AT ALL
GAD7 TOTAL SCORE: 0
5. BEING SO RESTLESS THAT IT IS HARD TO SIT STILL: NOT AT ALL
1. FEELING NERVOUS, ANXIOUS, OR ON EDGE: NOT AT ALL
3. WORRYING TOO MUCH ABOUT DIFFERENT THINGS: NOT AT ALL
7. FEELING AFRAID AS IF SOMETHING AWFUL MIGHT HAPPEN: NOT AT ALL
4. TROUBLE RELAXING: NOT AT ALL
GAD7 TOTAL SCORE: 0
2. NOT BEING ABLE TO STOP OR CONTROL WORRYING: NOT AT ALL

## 2019-10-15 ASSESSMENT — MIFFLIN-ST. JEOR: SCORE: 1561.97

## 2019-10-15 ASSESSMENT — PAIN SCALES - GENERAL: PAINLEVEL: NO PAIN (0)

## 2019-10-15 NOTE — PROGRESS NOTES
Subjective     Roque Mckeon is a 83 year old male who presents to clinic today for the following health issues:    History of Present Illness        Mental Health Follow-up:  Patient presents to follow-up on Depression.Patient's depression since last visit has been:  Good  The patient is not having other symptoms associated with depression.      Any significant life events: No  Patient is not feeling anxious or having panic attacks.  Patient has no concerns about alcohol or drug use.     Social History  Tobacco Use    Smoking status: Never Smoker    Smokeless tobacco: Never Used    Tobacco comment: never  Alcohol use: No    Alcohol/week: 0.0 standard drinks  Drug use: No      Today's PHQ-9         PHQ-9 Total Score:     (P) 1   PHQ-9 Q9 Thoughts of better off dead/self-harm past 2 weeks :   (P) Not at all   Thoughts of suicide or self harm:      Self-harm Plan:        Self-harm Action:          Safety concerns for self or others:           Diabetes:   He presents for follow up of diabetes.  He is checking home blood glucose a few times a week. He checks blood glucose before meals.  Blood glucose is sometimes over 200 and never under 70. He is aware of hypoglycemia symptoms including weakness. He has no concerns regarding his diabetes at this time.  He is not experiencing numbness or burning in feet, excessive thirst, blurry vision, weight changes or redness, sores or blisters on feet. The patient has had a diabetic eye exam in the last 12 months. Eye exam performed on 10/10.    [facility]    Hyperlipidemia:  He presents for follow up of hyperlipidemia.  He is taking medication to lower cholesterol. He is not having myalgia or other side effects to statin medications.He is not reporting shortness of breath, increased sweating or nausea with activity, left-sided neck or arm pain, chest pain or pressure, or pain in calves when walking 1-2 blocks.    He eats 0-1 servings of fruits and vegetables  daily.He consumes 0 sweetened beverage(s) daily.  He is taking medications regularly.     Patient Active Problem List   Diagnosis     Other left bundle branch block     HYPERLIPIDEMIA LDL GOAL <100     Major depression in complete remission (H)     Advanced directives, counseling/discussion     Encounter for long-term current use of medication     Gout     Type 2 diabetes mellitus without complication, without long-term current use of insulin (H)     Tick-borne disease     Adjustment disorder with mixed anxiety and depressed mood     Anxiety     Other iron deficiency anemia     Past Surgical History:   Procedure Laterality Date     COLONOSCOPY  8/3/2011    Procedure:COMBINED COLONOSCOPY, SINGLE BIOPSY/POLYPECTOMY BY BIOPSY; Surgeon:GRUPO STONER; Location: GI     COL[facility]OSCOPY  2016    [facility]WASH  02/27/2001    Normal.      FRAGMENTING OF KIDNEY STONE  03/05/2003    Left extracorporal shock wave lithotripsy, [facility]     PHACOEMULSIFICATION WITH STANDARD INTRAOCULAR LENS IMPLANT  1/20/2011    PHACOEMULSIFICATION WITH STANDARD INTRAOCULAR LENS IMPLANT performed by OSMAN CHO at  OR       Social History     Tobacco Use     Smoking status: Never Smoker     Smokeless tobacco: Never Used     Tobacco comment: never   Substance Use Topics     Alcohol use: No     Alcohol/week: 0.0 standard drinks     Family History   Problem Relation Age of Onset     Cardiovascular Mother      Arthritis Mother      Heart Disease Mother      Cerebrovascular Disease Mother      Cardiovascular Father      Heart Disease Father      Depression Son      Depression Sister      Genetic Disorder Son      Osteoporosis Sister      Psychotic Disorder Son      Breast Cancer Sister      Alcohol/Drug No family hx of      Circulatory No family hx of      Allergies No family hx of      Alzheimer Disease No family hx of      Blood Disease No family hx of      Cancer No family hx of      Diabetes No family hx  of      Gastrointestinal Disease No family hx of      Genitourinary Problems No family hx of      Lipids No family hx of      Neurologic Disorder No family hx of      Thyroid Disease No family hx of          Current Outpatient Medications   Medication Sig Dispense Refill     ACE NOT PRESCRIBED, INTENTIONAL, by Other route continuous prn.  0     ACE/ARB/ARNI NOT PRESCRIBED, INTENTIONAL, Please choose reason not prescribed, below       allopurinol (ZYLOPRIM) 300 MG tablet Take 1 tablet by mouth daily. 90 tablet 3     aspirin 325 MG EC tablet Take 1 tablet (325 mg) by mouth daily 90 tablet 3     blood glucose (ONETOUCH ULTRA) test strip USE TO TEST BLOOD SUGAR THREE TIMES A  each 3     blood glucose monitoring (NO BRAND SPECIFIED) meter device kit Use to test blood sugar 1 times daily or as directed. 1 kit 0     blood glucose monitoring (ONE TOUCH ULTRASOFT) lancets test 1 times daily 100 each 3     Blood Glucose Monitoring Suppl MISC 1 strip daily Or as directed. 100 each 0     clopidogrel (PLAVIX) 75 MG tablet Take 1 tablet (75 mg) by mouth daily 90 tablet 3     FISH OIL 1000 MG OR CAPS 1 capsule 2 times per day  0     metFORMIN (GLUCOPHAGE) 1000 MG tablet Take 1 tablet (1,000 mg) by mouth daily (with dinner) 90 tablet 3     Multiple Vitamins-Minerals (MULTIVITAMIN & MINERAL PO) Take 1 tablet by mouth daily       PARoxetine (PAXIL) 30 MG tablet TAKE ONE TABLET BY MOUTH AT BEDTIME 90 tablet 1     potassium citrate (UROCIT-K 10) 10 MEQ (1080 MG) SR tablet Take 1 tablet by mouth 3 times daily (with meals). 270 tablet 3     simvastatin (ZOCOR) 80 MG tablet Take 0.5 tablets (40 mg) by mouth daily 90 tablet 3     ASPIRIN 81 MG OR TABS 1 tab po QD (Once per day) 0 0     triamcinolone (KENALOG) 0.1 % cream Apply sparingly to affected area three times daily for 14 days. (Patient not taking: Reported on 10/29/2018) 30 g 0     Allergies   Allergen Reactions     No Known Drug Allergies      Recent Labs   Lab Test  "10/15/19  0950 11/05/18  1054 07/31/18  1526 01/04/18  0953 05/31/17 03/16/16  1133 03/25/15  1004  04/04/14  1448 09/18/13  0749   A1C 7.9*  --  7.3* 7.7* 7.7*   < > 7.7* 7.8*  --  7.5* 6.9*   LDL  --   --   --   --   --   --  72 61  --   --  79   HDL  --   --   --   --  53  --  53 52  --   --  46   TRIG  --   --   --   --  127  --  173* 93  --   --  141   ALT  --   --   --  31 38  --  32 32  --   --  39   CR  --   --   --  0.87 1.0  --  0.98 0.88  --  0.93  --    GFRESTIMATED  --  72  --  84 >60  --  74 84  --  79  --    GFRESTBLACK  --  87  --  >90  --   --  89 >90   GFR Calc    --  >90  --    POTASSIUM  --   --   --  4.5 4.9  --  4.6 4.3   < >  --   --    TSH  --   --   --  2.65  --   --   --   --   --  2.24  --     < > = values in this interval not displayed.      BP Readings from Last 3 Encounters:   10/15/19 126/72   06/27/19 132/64   11/16/18 122/60    Wt Readings from Last 3 Encounters:   10/15/19 84.3 kg (185 lb 12.8 oz)   06/27/19 83.4 kg (183 lb 14.4 oz)   11/16/18 85 kg (187 lb 4.8 oz)                    Reviewed and updated as needed this visit by Provider         Review of Systems   ROS COMP: Constitutional, HEENT, cardiovascular, pulmonary, GI, , musculoskeletal, neuro, skin, endocrine and psych systems are negative, except as otherwise noted.      Objective    /72   Pulse 84   Temp 98.8  F (37.1  C) (Temporal)   Resp 16   Ht 1.791 m (5' 10.5\")   Wt 84.3 kg (185 lb 12.8 oz)   BMI 26.28 kg/m    Body mass index is 26.28 kg/m .  Physical Exam   GENERAL: healthy, alert and no distress  NECK: no adenopathy, no asymmetry, masses, or scars and thyroid normal to palpation  RESP: lungs clear to auscultation - no rales, rhonchi or wheezes  CV: regular rate and rhythm, normal S1 S2, no S3 or S4, no murmur, click or rub, no peripheral edema and peripheral pulses strong  ABDOMEN: soft, nontender, no hepatosplenomegaly, no masses and bowel sounds normal  MS: no gross musculoskeletal " defects noted, no edema  NEURO: Normal strength and tone, mentation intact and speech normal  PSYCH: mentation appears normal, affect normal/bright  Diabetic foot exam: normal DP and PT pulses, no trophic changes or ulcerative lesions and normal sensory exam    Diagnostic Test Results:  Labs reviewed in Epic  Results for orders placed or performed in visit on 10/15/19 (from the past 24 hour(s))   CBC with platelets   Result Value Ref Range    WBC 4.1 4.0 - 11.0 10e9/L    RBC Count 4.03 (L) 4.4 - 5.9 10e12/L    Hemoglobin 12.4 (L) 13.3 - 17.7 g/dL    Hematocrit 38.8 (L) 40.0 - 53.0 %    MCV 96 78 - 100 fl    MCH 30.8 26.5 - 33.0 pg    MCHC 32.0 31.5 - 36.5 g/dL    RDW 14.6 10.0 - 15.0 %    Platelet Count 195 150 - 450 10e9/L   Hemoglobin A1c   Result Value Ref Range    Hemoglobin A1C 7.9 (H) 0 - 5.6 %           Assessment & Plan     1. Type 2 diabetes mellitus without complication, without long-term current use of insulin (H)  - CBC with platelets  - Comprehensive metabolic panel  - Hemoglobin A1c  - Iron and iron binding capacity  - blood glucose (ONETOUCH ULTRA) test strip; USE TO TEST BLOOD SUGAR THREE TIMES A DAY  Dispense: 100 each; Refill: 3  - blood glucose monitoring (ONE TOUCH ULTRASOFT) lancets; test 1 times daily  Dispense: 100 each; Refill: 3  - **A1C FUTURE 6mo; Future  - FOOT EXAM    2. Hyperlipidemia LDL goal <100  - CBC with platelets  - Comprehensive metabolic panel  - Hemoglobin A1c  - Iron and iron binding capacity    3. Adjustment disorder with mixed anxiety and depressed mood  - CBC with platelets  - Comprehensive metabolic panel  - Hemoglobin A1c  - Iron and iron binding capacity    4. Encounter for long-term current use of medication  - CBC with platelets  - Comprehensive metabolic panel  - Hemoglobin A1c    5. Other iron deficiency anemia  - Iron and iron binding capacity    6. Anxiety and depression  - PARoxetine (PAXIL) 30 MG tablet; TAKE ONE TABLET BY MOUTH AT BEDTIME  Dispense: 90 tablet;  "Refill: 1    7. Major depression in complete remission (H)  Doing well.    Patient has gotten most of his cares through the [facility].  We review some labs and note that his hemoglobin A1c in May was elevated and we discussed the fact that this was not necessarily the best thing for him.  We decided to recheck some labs today and note that it is much better controlled.  Increased diet and exercise is recommended for him.  Strongly recommend that we see him again in about 6 months to monitor this well.  Would have him continue to take his medications as directed.  He thinks he is taking another diabetic medication but cannot remember the name.  Recommended that he forward a copy of all of his medications to us and that he take any medications that he is on consistently with him in May so that we can reconcile his medications more completely.  No other changes discussed today refills of the things that we take care of for him are granted and sent to his pharmacy.  Follow-up in 6 months.       BMI:   Estimated body mass index is 26.28 kg/m  as calculated from the following:    Height as of this encounter: 1.791 m (5' 10.5\").    Weight as of this encounter: 84.3 kg (185 lb 12.8 oz).   Weight management plan: Discussed healthy diet and exercise guidelines    Return in about 6 months (around 4/15/2020) for Medication Recheck, recheck of current condition.    Adam Carvajal PA-C  [facility]    Answers for HPI/ROS submitted by the patient on 10/15/2019   Chronic problems general questions HPI Form  If you checked off any problems, how difficult have these problems made it for you to do your work, take care of things at home, or get along with other people?: Not difficult at all  PHQ9 TOTAL SCORE: 1  CHERI 7 TOTAL SCORE: 0    "

## 2019-10-16 ASSESSMENT — ANXIETY QUESTIONNAIRES: GAD7 TOTAL SCORE: 0

## 2019-10-16 ASSESSMENT — PATIENT HEALTH QUESTIONNAIRE - PHQ9: SUM OF ALL RESPONSES TO PHQ QUESTIONS 1-9: 1

## 2019-12-04 DIAGNOSIS — I65.21 CAROTID STENOSIS, ASYMPTOMATIC, RIGHT: ICD-10-CM

## 2019-12-05 RX ORDER — CLOPIDOGREL BISULFATE 75 MG/1
75 TABLET ORAL DAILY
Qty: 90 TABLET | Refills: 1 | Status: SHIPPED | OUTPATIENT
Start: 2019-12-05 | End: 2020-06-24

## 2019-12-05 NOTE — TELEPHONE ENCOUNTER
Pending Prescriptions:                       Disp   Refills    clopidogrel (PLAVIX) 75 MG tablet          90 tab*3        Sig: Take 1 tablet (75 mg) by mouth daily      Routing refill request to provider for review/approval because:  Labs out of range:  hgb    Teri Rodrigez RN, BSN

## 2020-01-08 ENCOUNTER — HOSPITAL ENCOUNTER (OUTPATIENT)
Dept: ULTRASOUND IMAGING | Facility: CLINIC | Age: 85
Discharge: HOME | End: 2020-01-08
Attending: SPECIALIST | Admitting: SPECIALIST
Payer: COMMERCIAL

## 2020-01-08 DIAGNOSIS — I65.21 CAROTID STENOSIS, ASYMPTOMATIC, RIGHT: ICD-10-CM

## 2020-01-08 DIAGNOSIS — I65.22 LEFT CAROTID ARTERY STENOSIS: ICD-10-CM

## 2020-01-08 PROCEDURE — 93880 EXTRACRANIAL BILAT STUDY: CPT

## 2020-01-13 ENCOUNTER — TELEPHONE (OUTPATIENT)
Dept: SURGERY | Facility: CLINIC | Age: 85
End: 2020-01-13

## 2020-01-13 DIAGNOSIS — I65.21 CAROTID STENOSIS, ASYMPTOMATIC, RIGHT: Primary | ICD-10-CM

## 2020-01-13 NOTE — TELEPHONE ENCOUNTER
"Patient able to come in to clinic Wed. 1-22,2020, as wife just had knee surgery.  Informed patient that Dr. Ma is in surgery that day, will have Dr. Ma review chart and US and call patient back to discuss.  Patient verbally states\" I would appreciate that\"  Message routed to Dr. Ma.        .................Isabelle Michael CMA  ([facility])  "

## 2020-01-24 NOTE — PROGRESS NOTES
Subjective     Roque Mckeon is a 84 year old male who presents to clinic today for the following health issues:    HPI   Diarrhea  Onset: August    Description:   Consistency of stool: runny and explosive  Blood in stool: no   Number of loose stools in past 24 hours: None, diarrhea comes and goes    Progression of Symptoms:  intermittent    Accompanying Signs & Symptoms:  Fever: no   Nausea or vomiting; no   Abdominal pain: no   Episodes of constipation: no   Weight loss: YES- 5 lbs    History:   Ill contacts: no   Recent use of antibiotics: no    Recent travels: no          Recent medication-new or changes(Rx or OTC): no     Precipitating factors:   Patient is uncertain     Alleviating factors:   None    Therapies Tried and outcome:  None;   Patient Active Problem List   Diagnosis     Other left bundle branch block     HYPERLIPIDEMIA LDL GOAL <100     Major depression in complete remission (H)     Advanced directives, counseling/discussion     Encounter for long-term current use of medication     Gout     Type 2 diabetes mellitus without complication, without long-term current use of insulin (H)     Tick-borne disease     Adjustment disorder with mixed anxiety and depressed mood     Anxiety     Other iron deficiency anemia     Past Surgical History:   Procedure Laterality Date     COLONOSCOPY  8/3/2011    Procedure:COMBINED COLONOSCOPY, SINGLE BIOPSY/POLYPECTOMY BY BIOPSY; Surgeon:GRUPO STONER; Location: GI     COLONOSCOPY  2016    [facility] COLONOSCOPY W/WO BRUSH/WASH  02/27/2001    Normal.     [facility] FRAGMENTING OF KIDNEY STONE  03/05/2003    Left extracorporal shock wave lithotripsy, [facility]     PHACOEMULSIFICATION WITH STANDARD INTRAOCULAR LENS IMPLANT  1/20/2011    PHACOEMULSIFICATION WITH STANDARD INTRAOCULAR LENS IMPLANT performed by OSMAN CHO at  OR       Social History     Tobacco Use     Smoking status: Never Smoker     Smokeless tobacco: Never Used     Tobacco comment: never    Substance Use Topics     Alcohol use: No     Alcohol/week: 0.0 standard drinks     Family History   Problem Relation Age of Onset     Cardiovascular Mother      Arthritis Mother      Heart Disease Mother      Cerebrovascular Disease Mother      Cardiovascular Father      Heart Disease Father      Depression Son      Depression Sister      Genetic Disorder Son      Osteoporosis Sister      Psychotic Disorder Son      Breast Cancer Sister      Alcohol/Drug No family hx of      Circulatory No family hx of      Allergies No family hx of      Alzheimer Disease No family hx of      Blood Disease No family hx of      Cancer No family hx of      Diabetes No family hx of      Gastrointestinal Disease No family hx of      Genitourinary Problems No family hx of      Lipids No family hx of      Neurologic Disorder No family hx of      Thyroid Disease No family hx of          Current Outpatient Medications   Medication Sig Dispense Refill     ACE NOT PRESCRIBED, INTENTIONAL, by Other route continuous prn.  0     ACE/ARB/ARNI NOT PRESCRIBED, INTENTIONAL, Please choose reason not prescribed, below       allopurinol (ZYLOPRIM) 300 MG tablet Take 1 tablet by mouth daily. 90 tablet 3     ASPIRIN 81 MG OR TABS 1 tab po QD (Once per day) 0 0     blood glucose (ONETOUCH ULTRA) test strip USE TO TEST BLOOD SUGAR THREE TIMES A  each 3     blood glucose monitoring (NO BRAND SPECIFIED) meter device kit Use to test blood sugar 1 times daily or as directed. 1 kit 0     blood glucose monitoring (ONE TOUCH ULTRASOFT) lancets test 1 times daily 100 each 3     Blood Glucose Monitoring Suppl MISC 1 strip daily Or as directed. 100 each 0     clopidogrel (PLAVIX) 75 MG tablet Take 1 tablet (75 mg) by mouth daily 90 tablet 1     FISH OIL 1000 MG OR CAPS 1 capsule 2 times per day  0     metFORMIN (GLUCOPHAGE) 1000 MG tablet Take 1 tablet (1,000 mg) by mouth daily (with dinner) 90 tablet 3     Multiple Vitamins-Minerals (MULTIVITAMIN & MINERAL  PO) Take 1 tablet by mouth daily       PARoxetine (PAXIL) 30 MG tablet TAKE ONE TABLET BY MOUTH AT BEDTIME 90 tablet 1     potassium citrate (UROCIT-K 10) 10 MEQ (1080 MG) SR tablet Take 1 tablet by mouth 3 times daily (with meals). 270 tablet 3     simvastatin (ZOCOR) 80 MG tablet Take 0.5 tablets (40 mg) by mouth daily 90 tablet 3     triamcinolone (KENALOG) 0.1 % cream Apply sparingly to affected area three times daily for 14 days. 30 g 0     STATIN NOT PRESCRIBED (INTENTIONAL) Please choose reason not prescribed, below       Allergies   Allergen Reactions     No Known Drug Allergies      Recent Labs   Lab Test 10/15/19  0950 05/30/19 11/05/18  1054 07/31/18  1526 01/04/18  0953 05/31/17 03/16/16  1133 03/25/15  1004  04/04/14  1448   A1C 7.9* 8.5*  --  7.3* 7.7* 7.7*   < > 7.7* 7.8*  --  7.5*   LDL  --  81  --   --   --   --   --  72 61  --   --    HDL  --  49  --   --   --  53  --  53 52  --   --    TRIG  --  152*  --   --   --  127  --  173* 93  --   --    ALT 26 20  --   --  31 38  --  32 32  --   --    CR 0.90 1.1  --   --  0.87 1.0  --  0.98 0.88  --  0.93   GFRESTIMATED 79 >60 72  --  84 >60  --  74 84  --  79   GFRESTBLACK >90  --  87  --  >90  --   --  89 >90   GFR Calc    --  >90   POTASSIUM 4.7 4.8  --   --  4.5 4.9  --  4.6 4.3   < >  --    TSH  --   --   --   --  2.65  --   --   --   --   --  2.24    < > = values in this interval not displayed.      BP Readings from Last 3 Encounters:   01/28/20 128/62   10/15/19 126/72   06/27/19 132/64    Wt Readings from Last 3 Encounters:   01/28/20 82.1 kg (180 lb 14.4 oz)   10/15/19 84.3 kg (185 lb 12.8 oz)   06/27/19 83.4 kg (183 lb 14.4 oz)                    Reviewed and updated as needed this visit by Provider         Review of Systems   ROS COMP: Constitutional, HEENT, cardiovascular, pulmonary, GI, , musculoskeletal, neuro, skin, endocrine and psych systems are negative, except as otherwise noted.      Objective    /62   Pulse 62  "  Temp 96.8  F (36  C) (Temporal)   Resp 18   Ht 1.786 m (5' 10.32\")   Wt 82.1 kg (180 lb 14.4 oz)   SpO2 94%   BMI 25.72 kg/m    Body mass index is 25.72 kg/m .  Physical Exam   GENERAL: healthy, alert and no distress  NECK: no adenopathy, no asymmetry, masses, or scars and thyroid normal to palpation  RESP: lungs clear to auscultation - no rales, rhonchi or wheezes  CV: regular rate and rhythm, normal S1 S2, no S3 or S4, no murmur, click or rub, no peripheral edema and peripheral pulses strong  ABDOMEN: soft, nontender, no hepatosplenomegaly, no masses and bowel sounds normal  MS: no gross musculoskeletal defects noted, no edema    Diagnostic Test Results:  Labs reviewed in Epic  No results found for this or any previous visit (from the past 24 hour(s)).        Assessment & Plan     1. Diarrhea, unspecified type  2. Incontinence of feces with fecal urgency  3. Type 2 diabetes mellitus without complication, without long-term current use of insulin (H)  4. Major depression in complete remission (H)  Evaluate from a lab stand point prior to specialist consultation.  - CBC with platelets  - Comprehensive metabolic panel  - Hemoglobin A1c  - Enteric Bacteria and Virus Panel by [facility] Stool; Future  - Cryptosporidium/Giardia Immunoassay; Future  - Clostridium difficile Toxin B PCR; Future  - Ova and Parasite Exam Routine; Future  - TSH with free T4 reflex     Return in about 4 weeks (around 2/25/2020) for recheck of current condition, if symptoms do not improve.    Adam Carvajal PA-C  [facility]"

## 2020-01-28 ENCOUNTER — OFFICE VISIT (OUTPATIENT)
Dept: FAMILY MEDICINE | Facility: OTHER | Age: 85
End: 2020-01-28
Payer: COMMERCIAL

## 2020-01-28 VITALS
HEART RATE: 62 BPM | HEIGHT: 70 IN | SYSTOLIC BLOOD PRESSURE: 128 MMHG | WEIGHT: 180.9 LBS | RESPIRATION RATE: 18 BRPM | BODY MASS INDEX: 25.9 KG/M2 | OXYGEN SATURATION: 94 % | DIASTOLIC BLOOD PRESSURE: 62 MMHG | TEMPERATURE: 96.8 F

## 2020-01-28 DIAGNOSIS — R15.2 INCONTINENCE OF FECES WITH FECAL URGENCY: ICD-10-CM

## 2020-01-28 DIAGNOSIS — R15.9 INCONTINENCE OF FECES WITH FECAL URGENCY: ICD-10-CM

## 2020-01-28 DIAGNOSIS — E11.9 TYPE 2 DIABETES MELLITUS WITHOUT COMPLICATION, WITHOUT LONG-TERM CURRENT USE OF INSULIN (H): ICD-10-CM

## 2020-01-28 DIAGNOSIS — R19.7 DIARRHEA, UNSPECIFIED TYPE: Primary | ICD-10-CM

## 2020-01-28 DIAGNOSIS — F32.5 MAJOR DEPRESSION IN COMPLETE REMISSION (H): ICD-10-CM

## 2020-01-28 LAB
ALBUMIN SERPL-MCNC: 3.9 G/DL (ref 3.4–5)
ALP SERPL-CCNC: 70 U/L (ref 40–150)
ALT SERPL W P-5'-P-CCNC: 23 U/L (ref 0–70)
ANION GAP SERPL CALCULATED.3IONS-SCNC: 8 MMOL/L (ref 3–14)
AST SERPL W P-5'-P-CCNC: 17 U/L (ref 0–45)
BILIRUB SERPL-MCNC: 0.4 MG/DL (ref 0.2–1.3)
BUN SERPL-MCNC: 23 MG/DL (ref 7–30)
CALCIUM SERPL-MCNC: 8.6 MG/DL (ref 8.5–10.1)
CHLORIDE SERPL-SCNC: 103 MMOL/L (ref 94–109)
CO2 SERPL-SCNC: 26 MMOL/L (ref 20–32)
CREAT SERPL-MCNC: 1.08 MG/DL (ref 0.66–1.25)
ERYTHROCYTE [DISTWIDTH] IN BLOOD BY AUTOMATED COUNT: 13.7 % (ref 10–15)
GFR SERPL CREATININE-BSD FRML MDRD: 64 ML/MIN/{1.73_M2}
GLUCOSE SERPL-MCNC: 239 MG/DL (ref 70–99)
HBA1C MFR BLD: 7.8 % (ref 0–5.6)
HCT VFR BLD AUTO: 39.6 % (ref 40–53)
HGB BLD-MCNC: 13 G/DL (ref 13.3–17.7)
MCH RBC QN AUTO: 31.9 PG (ref 26.5–33)
MCHC RBC AUTO-ENTMCNC: 32.8 G/DL (ref 31.5–36.5)
MCV RBC AUTO: 97 FL (ref 78–100)
PLATELET # BLD AUTO: 195 10E9/L (ref 150–450)
POTASSIUM SERPL-SCNC: 5 MMOL/L (ref 3.4–5.3)
PROT SERPL-MCNC: 7 G/DL (ref 6.8–8.8)
RBC # BLD AUTO: 4.08 10E12/L (ref 4.4–5.9)
SODIUM SERPL-SCNC: 137 MMOL/L (ref 133–144)
TSH SERPL DL<=0.005 MIU/L-ACNC: 3.79 MU/L (ref 0.4–4)
WBC # BLD AUTO: 4.8 10E9/L (ref 4–11)

## 2020-01-28 PROCEDURE — 87177 OVA AND PARASITES SMEARS: CPT | Performed by: PHYSICIAN ASSISTANT

## 2020-01-28 PROCEDURE — 80053 COMPREHEN METABOLIC PANEL: CPT | Performed by: PHYSICIAN ASSISTANT

## 2020-01-28 PROCEDURE — 36415 COLL VENOUS BLD VENIPUNCTURE: CPT | Performed by: PHYSICIAN ASSISTANT

## 2020-01-28 PROCEDURE — 84443 ASSAY THYROID STIM HORMONE: CPT | Performed by: PHYSICIAN ASSISTANT

## 2020-01-28 PROCEDURE — 87506 IADNA-DNA/RNA PROBE TQ 6-11: CPT | Performed by: PHYSICIAN ASSISTANT

## 2020-01-28 PROCEDURE — 83036 HEMOGLOBIN GLYCOSYLATED A1C: CPT | Performed by: PHYSICIAN ASSISTANT

## 2020-01-28 PROCEDURE — 99214 OFFICE O/P EST MOD 30 MIN: CPT | Performed by: PHYSICIAN ASSISTANT

## 2020-01-28 PROCEDURE — 87329 GIARDIA AG IA: CPT | Mod: XU | Performed by: PHYSICIAN ASSISTANT

## 2020-01-28 PROCEDURE — 87493 C DIFF AMPLIFIED PROBE: CPT | Performed by: PHYSICIAN ASSISTANT

## 2020-01-28 PROCEDURE — 85027 COMPLETE CBC AUTOMATED: CPT | Performed by: PHYSICIAN ASSISTANT

## 2020-01-28 PROCEDURE — 87209 SMEAR COMPLEX STAIN: CPT | Performed by: PHYSICIAN ASSISTANT

## 2020-01-28 PROCEDURE — 87328 CRYPTOSPORIDIUM AG IA: CPT | Performed by: PHYSICIAN ASSISTANT

## 2020-01-28 ASSESSMENT — MIFFLIN-ST. JEOR: SCORE: 1531.81

## 2020-01-28 ASSESSMENT — PAIN SCALES - GENERAL: PAINLEVEL: NO PAIN (0)

## 2020-01-28 NOTE — PATIENT INSTRUCTIONS
Patient Education   Coping with Diarrhea  What is diarrhea?  If you have loose, watery bowel movements at least three times a day, you have diarrhea. You may also have gas and stomach cramps.  Emotional upset, infection and bad reactions to food may make diarrhea worse.  How is it treated?  Severe diarrhea can be treated with medicine, such as Imodium and Lomotil. These slow the digestive tract and reduce the amount of fluid lost in bowel movements. Your care team can tell you if medicine is right for you.  What else can I do to treat or prevent diarrhea?    Avoid:  ? Fried, fatty, greasy, spicy or very sweet foods  ? Caffeine and alcohol  ? High-fiber foods such as whole grains, raw vegetables, unpeeled fresh fruits, dried fruits, dried beans and peas, nuts, seeds and popcorn  ? Chewing gum and sugar-free candies (these often contain a sugar alcohol that may increase diarrhea)  ? Gas-forming foods such as broccoli, cauliflower, brussels sprouts and carbonated (fizzy) drinks.    Eat smaller amounts of food, but eat more often. Serve foods cold or at room temperature.    Drink six to eight 8-ounce glasses of fluid each day, such as:  ? Fruit juice, watered-down (half water)  ? Broth or gelatin  ? Popsicles  ? Sports drinks or flat decaf soda pop (leave it open for at least 10 minutes before drinking).    Limit milk products to two low-fat servings daily.    Increase your potassium with watered-down orange juice, sports drinks, potatoes without skin, bananas or tomato products.    Increase your sodium with soups and broths, sports drinks, crackers and pretzels.    Rest and avoid stress.    Weigh yourself daily. Keep a record of your weight and how often you pass loose stools.    Take warm baths to keep yourself clean. Tell your doctor if your rectum is red, painful or swollen.    Think about buying a skin barrier (such as Aquaphor) at the drug store. This can help protect the skin around your rectum from irritation  and skin breakdown. Use it after each bowel movement.    If diarrhea is severe, have only clear liquids (liquids that you can see through) until it stops. Once it stops, slowly return to your normal diet.  When should I call my care team?  Call your care team if:    You follow the steps listed here, but your diarrhea does not improve within 24 hours.    You have more than six loose stools in one day.    You have sudden, severe belly pain.    You have been unable to eat for over two days.    You have fever or dizziness along with diarrhea.    You notice blood in your stool or you have black, tarry stools.  Food group Recommended foods Foods to avoid   Meats, eggs and cheese Broiled or baked lean meat, fish, chicken or turkey (no skin). Eggs, well-cooked. Beans. Chicken or turkey with the skin.   Breads and starches Breads, rolls and pastas made from white flour. Instant white rice, refined cereals (Cream of Wheat, Cream of Rice, Cornflakes, Rice Krispies), pancakes, waffles, muffins, cornbread, brandi crackers. Whole grain breads and cereals, bran, Shredded Wheat, wild rice, granola.   Fruits and vegetables Canned, frozen or peeled fresh fruits such as bananas or applesauce. Tomato paste, tomato sauce, tomato puree, cooked vegetables (acorn squash, asparagus, beets, carrots, celery, green beans, mushrooms, baked potato without skin, peeled zucchini). Unpeeled fruits, melons, grapefruit juice, all vegetables not listed on the left.   Milk products Skim or 1% milk, low-fat yogurt, low-fat cheese. Whole or 2% milk, high-fat ice cream, cream.   Drinks Sports drinks, watered-down fruit juices. (No caffeine.) Prune juice, caffeine.   Desserts Cookies, cake, gelatin, sherbet, fruit pies (avoid pies made with unpeeled fruit). Nuts, coconut, chocolate, licorice.   Other Broth, butter, margarine, mayonnaise, salad dressing, vegetable oil. Hot sauce, pepper, chili powder, taco seasoning.    Comments:  __________________________________________  __________________________________________  __________________________________________  __________________________________________  __________________________________________  __________________________________________  __________________________________________  __________________________________________  __________________________________________  __________________________________________  For informational purposes only. Not to replace the advice of your health care provider. Copyright   2006 [facility]. All rights reserved. Clinically reviewed by [facility]. [employer] 116925 - REV 04/19.       Patient Education     Diarrhea with Uncertain Cause (Adult)    Diarrhea is when stools are loose and watery. This can be caused by:    Viral infections    Bacterial infections    Food poisoning    Parasites    Irritable bowel syndrome (IBS)    Inflammatory bowel diseases such as ulcerative colitis, Crohn's disease, and celiac disease    Food intolerance, such as to lactose, the sugar found in milk and milk products    Reaction to medicines like antibiotics, laxatives, cancer drugs, and antacids  Along with diarrhea, you may also have:    Abdominal pain and cramping    Nausea and vomiting    Loss of bowel control    Fever and chills    Bloody stools  In some cases, antibiotics may help to treat diarrhea. You may have a stool sample test. This is done to see what is causing your diarrhea, and if antibiotics will help treat it. The results of a stool sample test may take up to 2 days. The healthcare provider may not give you antibiotics until he or she has the stool test results.  Diarrhea can cause dehydration. This is the loss of too much water and other fluids from the body. When this occurs, body fluid must be replaced. This can be done with oral rehydration solutions. Oral rehydration solutions are available at drugstores and grocery stores  without a prescription. Sports drinks are not the best choice if you are very dehydrated. They have too much sugar and not enough electrolytes.  Home care  Follow all instructions given by your healthcare provider. Rest at home for the next 24 hours, or until you feel better. Avoid caffeine, tobacco, and alcohol. These can make diarrhea, cramping, and pain worse.  If taking medicines:    Over-the-counter nausea and diarrhea medicines are generally OK unless you experience fever or blood stool. Check with your doctor first in those circumstances.    You may use acetaminophen or NSAID medicines like ibuprofen or naproxen to reduce pain and fever. Don t use these if you have chronic liver or kidney disease, or ever had a stomach ulcer or gastrointestinal bleeding. Don't use NSAID medicines if you are already taking one for another condition (like arthritis) or are on daily aspirin therapy (such as for heart disease or after a stroke). Talk with your healthcare provider first.    If antibiotics were prescribed, be sure you take them until they are finished. Don t stop taking them even when you feel better. Antibiotics must be taken as a full course.  To prevent the spread of illness:    Remember that washing with soap and water and using alcohol-based  is the best way to prevent the spread of infection. Dry your hands with a single use towel (like a paper towel).    Clean the toilet after each use.    Wash your hands before eating.    Wash your hands before and after preparing food. Keep in mind that people with diarrhea or vomiting should not prepare food for others.    Wash your hands after using cutting boards, countertops, and knives that have been in contact with raw foods.    Wash and then peel fruits and vegetables.    Keep uncooked meats away from cooked and ready-to-eat foods.    Use a food thermometer when cooking. Cook poultry to at least 165 F (74 C). Cook ground meat (beef, veal, pork, lamb) to at  least 160 F (71 C). Cook fresh beef, veal, lamb, and pork to at least 145 F (63 C).    Don t eat raw or undercooked eggs (poached or maggie side up), poultry, meat, or unpasteurized milk and juices.  Food and drinks  The main goal while treating vomiting or diarrhea is to prevent dehydration. This is done by taking small amounts of liquids often.    Keep in mind that liquids are more important than food right now.    Drink only small amounts of liquids at a time.    Don t force yourself to eat, especially if you are having cramping, vomiting, or diarrhea. Don t eat large amounts at a time, even if you are hungry.    If you eat, avoid fatty, greasy, spicy, or fried foods.    Don t eat dairy foods or drink milk if you have diarrhea. These can make diarrhea worse.  During the first 24 hours you can try:    Oral rehydration solutions.  Sports drinks may be used if you are not too dehydrated and are otherwise healthy.    Soft drinks without caffeine    Ginger ale    Water (plain or flavored)    Decaf tea or coffee    Clear broth, consommé, or bouillon    Gelatin, popsicles, or frozen fruit juice bars  The second 24 hours, if you are feeling better, you can add:    Hot cereal, plain toast, bread, rolls, or crackers    Plain noodles, rice, mashed potatoes, chicken noodle soup, or rice soup    Unsweetened canned fruit (no pineapple)    Bananas  As you recover:    Limit fat intake to less than 15 grams per day. Don t eat margarine, butter, oils, mayonnaise, sauces, gravies, fried foods, peanut butter, meat, poultry, or fish.    Limit fiber. Don t eat raw or cooked vegetables, fresh fruits except bananas, or bran cereals.    Limit caffeine and chocolate.    Limit dairy.    Don t use spices or seasonings except salt.    Go back to your normal diet over time, as you feel better and your symptoms improve.    If the symptoms come back, go back to a simple diet or clear liquids.  Follow-up care  Follow up with your healthcare  "provider, or as advised. If a stool sample was taken or cultures were done, call the healthcare provider for the results as instructed.  Call 911  Call 911 if you have any of these symptoms:    Trouble breathing    Confusion    Extreme drowsiness or trouble walking    Loss of consciousness    Rapid heart rate    Chest pain    Stiff neck    Seizure  When to seek medical advice  Call your healthcare provider right away if any of these occur:    Abdominal pain that gets worse    Constant lower right abdominal pain    Continued vomiting and inability to keep liquids down    Diarrhea more than 5 times a day    Blood in vomit or stool    Dark urine or no urine for 8 hours, dry mouth and tongue, tiredness, weakness, or dizziness    Drowsiness    New rash    You don t get better in 2 to 3 days    Fever of 100.4 F (38 C) or higher, or as directed by your healthcare provider  Date Last Reviewed: 6/1/2018 2000-2019 [email or web address]. [address] [place] [address]. All rights reserved. This information is not intended as a substitute for professional medical care. Always follow your healthcare professional's instructions.           Patient Education     Viral Diarrhea (Adult)    Diarrhea caused by a virus is often called viral gastroenteritis. Many people call it the \"stomach flu,\" but it has nothing to do with influenza. The virus that causes diarrhea affects the stomach and intestinal tract and usually lasts from 2 to 7 days. Diarrhea is the passing of loose, watery stools 3 or more times a day.  Symptoms  Along with diarrhea, you may have these symptoms:    Abdominal pain and cramping    Nausea and vomiting    Loss of bowel control    Fever and chills    Bloody stools  The danger from repeated diarrhea is dehydration. Dehydration is the loss of too much water and other fluids from the body without taking in enough to replace what is lost.  Antibiotics are not effective in this illness, but there are a " number of things you can do at home that will help.  Home care  Follow these home care measures:    If symptoms are severe, rest at home for the next 24 hours or until you are feeling better.    Wash your hands with soap and water or alcohol-based  to prevent the spread of infection. Wash your hands after touching anyone who is sick.    Wash your hands after using the toilet and before meals. Clean the toilet after each use.  Food preparation:    People with diarrhea should not prepare food for others. When preparing foods, wash your hands after touching anyone who is sick.    Wash your hands after using cutting boards, countertops, and knives that have been in contact with raw food.    Keep uncooked meats away from cooked and ready-to-eat foods.  Medicines:    You may use acetaminophen or NSAIDS such as ibuprofen or naproxen to control fever unless another medicine was prescribed.  If you have chronic liver or kidney disease or ever had a stomach ulcer or gastrointestinal bleeding, talk with your healthcare provider before using these medicines. Aspirin should never be used in anyone under 18 years of age who is ill with a fever. It may cause severe liver damage. Don't use NSAID medicines if you are already taking one for another condition (like arthritis) or are on aspirin (such as for heart disease or after a stroke).    Anti-diarrhea medicine should be taken for this condition only if advised by your healthcare provider. Sometimes anti-diarrhea medicine can make your condition worse. If you have bloody diarrhea or fever, check with your healthcare provider before taking antidiarrheals.  Diet:    Water and clear liquids are important so you don't get dehydrated. Drink small amounts at a time, don't guzzle it down. If you are very dehydrated, sports drinks aren't a good choice. They have too much sugar and not enough electrolytes. In this case, commercially available products called oral rehydration  solutions are best.    Caffeine, tobacco, and alcohol can make the diarrhea, cramping, and pain worse.    Don't force yourself to eat, especially if you have cramping, vomiting, or diarrhea. Don't eat large amounts at a time, even if you are hungry. It may make you feel worse.    If you eat, avoid fatty, greasy, spicy, or fried foods.    No dairy products, as they can make diarrhea worse.  During the first 24 Hours (the first full day) follow the diet below:    Beverages: Water, clear liquids, soft drinks without caffeine; ginger ale, mineral water (plain or flavored), decaffeinated tea and coffee.    Soups: Clear broth, consommé and bouillon    Desserts: Plain gelatin, popsicles and fruit juice bars  During the next 24 hours (the second day) you may add the following to the above if you have improved:    Hot cereal, plain toast, bread, rolls, crackers    Plain noodles, rice, mashed potatoes, chicken noodle or rice soup    Unsweetened canned fruit like applesauce and bananas (avoid pineapple and citrus)    Limit fat intake to less than 15 grams per day by avoiding margarine, butter, oils, mayonnaise, sauces, gravies, fried foods, peanut butter, meat, poultry and fish.    Limit fiber; avoid raw or cooked vegetables, fresh fruits (except bananas) and bran cereals.    Limit caffeine and chocolate. No spices or seasonings except salt.  During the next 24 hours    Gradually resume a normal diet, as you feel better and your symptoms improve.    If at any time the diarrhea or cramping gets worse, go back to the simpler diet (above) or to clear liquids.  Follow-up care  Follow up with your healthcare provider, or as advised. Call if you are not improving within 24 hours or if the diarrhea lasts more than one week. This is especially true if you are in a high-risk group. For example, if you are very elderly, have a weak immune system (from cancer treatment for example), or you have inflammatory bowel disease (Crohn's or  colitis).  If a stool (diarrhea) sample was taken, you may call in 2 days (or as directed) for the results.  When to seek medical advice  Call your healthcare provider right away if any of the following occur:    Increasing abdominal pain or constant lower right abdominal pain    Continued vomiting (unable to keep liquids down)    Frequent diarrhea (more than 5 times a day)    Blood in vomit or stool (black or red color)    Reduced oral intake    Dark urine, reduced urine output    Weakness, dizziness    Drowsiness    Fever of 100.4 F (38 C) oral or higher, or as directed by your healthcare provider    New rash  Call 911  Call 911 if any of the following occur:    Trouble breathing    Confused    Severe drowsiness or trouble awakening    Fainting or loss of consciousness    Rapid heart rate    Seizure    Stiff neck  Date Last Reviewed: 3/1/2018    6408-6814 [employer]. [address] [place] [address]. All rights reserved. This information is not intended as a substitute for professional medical care. Always follow your healthcare professional's instructions.           Patient Education     Food Poisoning or Viral Gastroenteritis (Adult)  You have a stomach illness that is likely either food poisoning or viral gastroenteritis.  Food poisoning is illness that is passed along in food and affects the stomach and intestinal tract. It usually occurs from 1 to 24 hours after eating food that has spoiled. When it happens within a few hours of eating, it is often caused by toxins from bacteria in food that has not been cooked or refrigerated properly.  Viral gastroenteritis is an illness from a virus that also affects the stomach and intestinal tract. Many people call it the  stomach flu,  but it has nothing to do with influenza. In fact, it can happen from food poisoning, but it can also happen when germs are passed from person-to-person or contaminated surface (toothbrush, cutting board, toilet) to  a person.  Either illness can cause these symptoms:    Abdominal pain and cramping    Nausea    Vomiting    Diarrhea    Fever and chills    Loss of bowel control  The symptoms of food poisoning usually last 1 to 2 days. The symptoms of viral gastroenteritis can sometimes last up to 7 days but usually end sooner.  Antibiotics are not effective for either illness.  Other causes of gastroenteritis include bacteria and parasites which are not discussed here.  Home care  Follow all instructions given by your healthcare provider. Rest at home for the next 24 hours, or until you feel better. Avoid caffeine, tobacco, and alcohol. These can make diarrhea, cramping, and pain worse.  If taking medicines:    Over-the-counter diarrhea or nausea medicines are generally OK unless you have bleeding, fever, or severe abdominal pain.    You may use acetaminophen or NSAID medicines like ibuprofen or naproxen to reduce pain and fever. Don t use these if you have chronic liver or kidney disease, or ever had a stomach ulcer or gastrointestinal bleeding. Talk with your healthcare provider first. Don't use NSAID medicines if you are already taking one for another condition (like arthritis) or are on daily aspirin therapy (such as for heart disease or after a stroke).  To prevent the spread of illness:    Remember that washing with soap and water is the best way to prevent the spread of infection. Wash your hands before and after caring for a sick person. Dry your hands with a single use towel.    Clean the toilet after each use.    Wash your hands or use alcohol-based hand  before eating.    Wash your hands or use alcohol-based hand  before and after preparing food. Keep in mind that people with diarrhea or vomiting should not prepare food for others.    Wash your hands or use alcohol-based hand  after using cutting boards, counter-tops, and knives (and other utensils) that have been in contact with raw  foods.    Wash and then peel fruits and vegetables.    Keep uncooked meats away from cooked and ready-to-eat foods.    Use a food thermometer when cooking. Cook poultry to at least 165 F (74 C). Cook ground meat (beef, veal, pork, lamb) to at least 160 F (71 C). Cook fresh beef, veal, lamb, and pork to at least 145 F (63 C).    Don t eat raw or undercooked eggs (poached or maggie side up), poultry, meat or unpasteurized milk and juices.  Food and drinks  The main goal while treating vomiting or diarrhea is to prevent dehydration. This is done by taking small amounts of liquids often.    Keep in mind that liquids are more important than food right now.    Drink only small amounts of liquids at a time.    Don t force yourself to eat, especially if you are having cramping, vomiting, or diarrhea. Don t eat large amounts at a time, even if you are hungry.    If you eat, avoid fatty, greasy, spicy, or fried foods.    Don t eat dairy foods or drink milk if you have diarrhea. These can make diarrhea worse.  The first 24 hours you can try:    Oral rehydration solutions, available at grocery stores and pharmacies. Sports drinks are not a good choice if you are very dehydrated. They have too much sugar and not enough electrolytes.    Soft drinks without caffeine    Ginger ale    Water (plain or flavored)    Decaf tea or coffee    Clear broth, consommé, or bouillon    Gelatin, ice pops, or frozen fruit juice bars   The second 24 hours, if you are feeling better, you can add:    Hot cereal, plain toast, bread, rolls, or crackers    Plain noodles, rice, mashed potatoes, chicken noodle soup, or rice soup    Unsweetened canned fruit (no pineapple)    Bananas  As you recover:    Limit fat intake to less than 15 grams per day. Don t eat margarine, butter, oils, mayonnaise, sauces, gravies, fried foods, peanut butter, meat, poultry, or fish.    Limit fiber. Don t eat raw or cooked vegetables, fresh fruits except bananas, and bran  cereals.    Limit caffeine and chocolate.    Don t use spices or seasonings except salt.    Resume a normal diet over time, as you feel better and your symptoms improve.    If the symptoms come back, go back to a simple diet or clear liquids.  Follow-up care  Follow up with your healthcare provider, or as advised. If a stool sample was taken or cultures were done, call the healthcare provider for the results as instructed.  Call 911  Call 911 if you have any of these symptoms:    Trouble breathing    Confusion    Extreme drowsiness or trouble walking    Loss of consciousness    Rapid heart rate    Chest pain    Stiff neck    Seizure  When to seek medical advice  Call your healthcare provider right away if any of these occur:    Abdominal pain that gets worse    Constant lower right abdominal pain    Continued vomiting and inability to keep liquids down    Diarrhea more than 5 times a day    Blood in vomit or stool    Dark urine or no urine for 8 hours, dry mouth and tongue, tiredness, weakness, or dizziness    New rash    You don t get better in 2 to 3 days    Fever of 100.4 F (38 C) or higher, or as directed by your healthcare provider    You have new symptoms of arthritis  Date Last Reviewed: 6/1/2018 2000-2019 [employer]. [address] [place] [address], [place] [address]. All rights reserved. This information is not intended as a substitute for professional medical care. Always follow your healthcare professional's instructions.

## 2020-01-29 DIAGNOSIS — F32.5 MAJOR DEPRESSION IN COMPLETE REMISSION (H): ICD-10-CM

## 2020-01-29 DIAGNOSIS — R15.2 INCONTINENCE OF FECES WITH FECAL URGENCY: ICD-10-CM

## 2020-01-29 DIAGNOSIS — R15.9 INCONTINENCE OF FECES WITH FECAL URGENCY: ICD-10-CM

## 2020-01-29 DIAGNOSIS — R19.7 DIARRHEA, UNSPECIFIED TYPE: ICD-10-CM

## 2020-01-29 DIAGNOSIS — E11.9 TYPE 2 DIABETES MELLITUS WITHOUT COMPLICATION, WITHOUT LONG-TERM CURRENT USE OF INSULIN (H): ICD-10-CM

## 2020-01-29 LAB
C COLI+JEJUNI+LARI FUSA STL QL NAA+PROBE: NOT DETECTED
C DIFF TOX B STL QL: NEGATIVE
EC STX1 GENE STL QL NAA+PROBE: NOT DETECTED
EC STX2 GENE STL QL NAA+PROBE: NOT DETECTED
ENTERIC PATHOGEN COMMENT: NORMAL
NOROV GI+II ORF1-ORF2 JNC STL QL NAA+PR: NOT DETECTED
RVA NSP5 STL QL NAA+PROBE: NOT DETECTED
SALMONELLA SP RPOD STL QL NAA+PROBE: NOT DETECTED
SHIGELLA SP+EIEC IPAH STL QL NAA+PROBE: NOT DETECTED
SPECIMEN SOURCE: NORMAL
V CHOL+PARA RFBL+TRKH+TNAA STL QL NAA+PR: NOT DETECTED
Y ENTERO RECN STL QL NAA+PROBE: NOT DETECTED

## 2020-01-30 LAB
C PARVUM AG STL QL IA: NEGATIVE
G LAMBLIA AG STL QL IA: NEGATIVE
O+P STL MICRO: NORMAL
O+P STL MICRO: NORMAL
SPECIMEN SOURCE: NORMAL
SPECIMEN SOURCE: NORMAL

## 2020-01-31 ENCOUNTER — TELEPHONE (OUTPATIENT)
Dept: FAMILY MEDICINE | Facility: OTHER | Age: 85
End: 2020-01-31

## 2020-02-03 ENCOUNTER — HOSPITAL ENCOUNTER (OUTPATIENT)
Dept: CT IMAGING | Facility: CLINIC | Age: 85
Discharge: HOME | End: 2020-02-03
Attending: SPECIALIST | Admitting: SPECIALIST
Payer: COMMERCIAL

## 2020-02-03 DIAGNOSIS — I65.21 CAROTID STENOSIS, ASYMPTOMATIC, RIGHT: ICD-10-CM

## 2020-02-03 PROCEDURE — 25000125 ZZHC RX 250: Performed by: RADIOLOGY

## 2020-02-03 PROCEDURE — 25000128 H RX IP 250 OP 636: Performed by: RADIOLOGY

## 2020-02-03 PROCEDURE — 70498 CT ANGIOGRAPHY NECK: CPT

## 2020-02-03 RX ORDER — IOPAMIDOL 755 MG/ML
500 INJECTION, SOLUTION INTRAVASCULAR ONCE
Status: COMPLETED | OUTPATIENT
Start: 2020-02-03 | End: 2020-02-03

## 2020-02-03 RX ADMIN — SODIUM CHLORIDE 100 ML: 9 INJECTION, SOLUTION INTRAVENOUS at 08:58

## 2020-02-03 RX ADMIN — IOPAMIDOL 80 ML: 755 INJECTION, SOLUTION INTRAVENOUS at 08:58

## 2020-02-05 ENCOUNTER — TELEPHONE (OUTPATIENT)
Dept: SURGERY | Facility: CLINIC | Age: 85
End: 2020-02-05

## 2020-02-05 DIAGNOSIS — I65.22 LEFT CAROTID ARTERY STENOSIS: ICD-10-CM

## 2020-02-05 DIAGNOSIS — I65.21 CAROTID STENOSIS, ASYMPTOMATIC, RIGHT: Primary | ICD-10-CM

## 2020-02-05 NOTE — TELEPHONE ENCOUNTER
Dr. Ma to contact patient with CTA results.        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     KASIA CAT MD        Message routed to Dr. Ma

## 2020-02-07 NOTE — TELEPHONE ENCOUNTER
Reviewed CT with patient - stenosis stable - will cont non-operative management.  Repeat us 6 months

## 2020-03-23 ENCOUNTER — TELEPHONE (OUTPATIENT)
Dept: FAMILY MEDICINE | Facility: OTHER | Age: 85
End: 2020-03-23

## 2020-03-23 NOTE — LETTER
[facility]  [id number] [place] [id number]  Phone: [phone]  March 24, 2020      Roque Mckeon  [address] [address] [place] [id number]      Dear Roque,    We care about your health and have reviewed your health plan including your medical conditions, medications, and lab results.  Based on this review, it is recommended that you follow up regarding the following health topic(s):  -Depression  -Diabetes  -Wellness (Physical) Visit     We recommend you take the following action(s):  -Complete and return the attached PHQ-9 Form.  If your total score is greater than 9, please schedule a followup appointment.  If you answer Yes to question 9, call your clinic between the hours of 8 to 5.  You may also call the Suicide Hotline at 7-718-283-EZHU (3856) any time.  -Please schedule a physical later this year. Please complete diabetic eye exam and have this sent to this [facility].     Please call us at the [facility] - [phone] (or use JumpChat) to address the above recommendations.     Thank you for trusting [facility] and we appreciate the opportunity to serve you.  We look forward to supporting your healthcare needs in the future.    Healthy Regards,    Your Health Care Team  [facility]

## 2020-03-23 NOTE — TELEPHONE ENCOUNTER
Summary:    Patient is due/failing the following:   Eye exam, fall risk, FOLLOW UP and PHQ9    Action needed:   Patient needs office visit for diabetes, depression recheck. Due on or around 4/15/2020.   Patient needs to do PHQ9 and fall risk.  Patient needs eye exam, results from eye clinic faxed to us or date and results from patient.    Type of outreach:    Attempted to reach pt but there was no answer/voicemail    Questions for provider review:    None                                                                                                                                    Luna Adamson CMA ([facility])       Chart routed to Care Team .      Panel Management Review      Patient has the following on his problem list:     Depression / Dysthymia review    Measure:  Needs PHQ-9 score of 4 or less during index window.  Administer PHQ-9 and if score is 5 or more, send encounter to provider for next steps.    5 - 7 month window range:     PHQ-9 SCORE 7/31/2018 10/29/2018 10/15/2019   PHQ-9 Total Score - - -   PHQ-9 Total Score MyChart 0 2 (Minimal depression) 1 (Minimal depression)   PHQ-9 Total Score 0 2 1       If PHQ-9 recheck is 5 or more, route to provider for next steps.    Patient is due for:  PHQ9    Diabetes    ASA: Passed    Last A1C  Lab Results   Component Value Date    A1C 7.8 01/28/2020    A1C 7.9 10/15/2019    A1C 8.5 05/30/2019    A1C 7.3 07/31/2018    A1C 7.7 01/04/2018     A1C tested: Passed    Last LDL:    Lab Results   Component Value Date    CHOL 106 05/30/2019     Lab Results   Component Value Date    HDL 49 05/30/2019     Lab Results   Component Value Date    LDL 81 05/30/2019     Lab Results   Component Value Date    TRIG 152 05/30/2019     Lab Results   Component Value Date    CHOLHDLRATIO 2.5 03/25/2015     Lab Results   Component Value Date    NHDL 107 03/16/2016       Is the patient on a Statin? YES             Is the patient on Aspirin? YES    Medications     HMG CoA Reductase Inhibitors      simvastatin (ZOCOR) 80 MG tablet        STATIN NOT PRESCRIBED (INTENTIONAL)       Salicylates     ASPIRIN 81 MG OR TABS             Last three blood pressure readings:  BP Readings from Last 3 Encounters:   01/28/20 128/62   10/15/19 126/72   06/27/19 132/64       Date of last diabetes office visit: 10/15/2019     Tobacco History:     History   Smoking Status     Never Smoker   Smokeless Tobacco     Never Used     Comment: never           Composite cancer screening  Chart review shows that this patient is due/due soon for the following None

## 2020-03-27 ENCOUNTER — MEDICAL CORRESPONDENCE (OUTPATIENT)
Dept: HEALTH INFORMATION MANAGEMENT | Facility: CLINIC | Age: 85
End: 2020-03-27

## 2020-04-02 ASSESSMENT — PATIENT HEALTH QUESTIONNAIRE - PHQ9
SUM OF ALL RESPONSES TO PHQ QUESTIONS 1-9: 1
5. POOR APPETITE OR OVEREATING: NOT AT ALL

## 2020-04-02 ASSESSMENT — ANXIETY QUESTIONNAIRES
IF YOU CHECKED OFF ANY PROBLEMS ON THIS QUESTIONNAIRE, HOW DIFFICULT HAVE THESE PROBLEMS MADE IT FOR YOU TO DO YOUR WORK, TAKE CARE OF THINGS AT HOME, OR GET ALONG WITH OTHER PEOPLE: NOT DIFFICULT AT ALL
5. BEING SO RESTLESS THAT IT IS HARD TO SIT STILL: NOT AT ALL
7. FEELING AFRAID AS IF SOMETHING AWFUL MIGHT HAPPEN: NOT AT ALL
1. FEELING NERVOUS, ANXIOUS, OR ON EDGE: SEVERAL DAYS
6. BECOMING EASILY ANNOYED OR IRRITABLE: NOT AT ALL
3. WORRYING TOO MUCH ABOUT DIFFERENT THINGS: NOT AT ALL
GAD7 TOTAL SCORE: 1
2. NOT BEING ABLE TO STOP OR CONTROL WORRYING: NOT AT ALL

## 2020-04-02 NOTE — TELEPHONE ENCOUNTER
PHQ9 returned. Entered into patients chart.   Melvi Cespedes CMA ([facility])

## 2020-04-03 ASSESSMENT — ANXIETY QUESTIONNAIRES: GAD7 TOTAL SCORE: 1

## 2020-04-27 DIAGNOSIS — F32.A ANXIETY AND DEPRESSION: ICD-10-CM

## 2020-04-27 DIAGNOSIS — F41.9 ANXIETY AND DEPRESSION: ICD-10-CM

## 2020-04-28 RX ORDER — PAROXETINE 30 MG/1
TABLET, FILM COATED ORAL
Qty: 90 TABLET | Refills: 1 | Status: SHIPPED | OUTPATIENT
Start: 2020-04-28 | End: 2020-11-06

## 2020-04-28 NOTE — TELEPHONE ENCOUNTER
Pending Prescriptions:                       Disp   Refills    PARoxetine (PAXIL) 30 MG tablet [Pharmacy*90 tab*1            Sig: TAKE ONE TABLET BY MOUTH ONCE DAILY AT BEDTIME    PHQ 10/29/2018 10/15/2019 4/2/2020   PHQ-9 Total Score 2 1 1   Q9: Thoughts of better off dead/self-harm past 2 weeks Not at all Not at all Not at all     Prescription approved per [facility] Refill Protocol.    Teri Rodrigez, MSN, RN

## 2020-06-10 ENCOUNTER — TELEPHONE (OUTPATIENT)
Dept: FAMILY MEDICINE | Facility: OTHER | Age: 85
End: 2020-06-10

## 2020-06-10 DIAGNOSIS — E78.5 HYPERLIPIDEMIA LDL GOAL <100: Primary | ICD-10-CM

## 2020-06-10 DIAGNOSIS — E11.9 TYPE 2 DIABETES MELLITUS WITHOUT COMPLICATION, WITHOUT LONG-TERM CURRENT USE OF INSULIN (H): ICD-10-CM

## 2020-06-10 NOTE — TELEPHONE ENCOUNTER
"  Panel Management Review    Summary:    Patient is due/failing the following:   Microalbumin, Fall, A1C, FOLLOW UP, LDL and PHYSICAL    Action needed:   Patient needs virtual visit for Medicare Physical/Medication Recheck (If patient can not do \"Virtual Visit\" can do telephone for \"diabetes/mood\" recheck.  Patient needs fasting lab only appointment      Type of outreach:    Phone, left message for patient to call back.     Questions for provider review:    None                                                                                                           Indiana Krishna CMA ([facility])     Chart routed to Care Team .        Patient has the following on his problem list:     Depression / Dysthymia review    Measure:  Needs PHQ-9 score of 4 or less during index window.  Administer PHQ-9 and if score is 5 or more, send encounter to provider for next steps.    5 - 7 month window range:     PHQ-9 SCORE 10/29/2018 10/15/2019 4/2/2020   PHQ-9 Total Score - - -   PHQ-9 Total Score MyChart 2 (Minimal depression) 1 (Minimal depression) -   PHQ-9 Total Score 2 1 1       If PHQ-9 recheck is 5 or more, route to provider for next steps.    Patient is due for:  None    Diabetes    ASA:     Last A1C  Lab Results   Component Value Date    A1C 7.8 01/28/2020    A1C 7.9 10/15/2019    A1C 8.5 05/30/2019    A1C 7.3 07/31/2018    A1C 7.7 01/04/2018     A1C tested: Passed    Last LDL:    Lab Results   Component Value Date    CHOL 106 05/30/2019     Lab Results   Component Value Date    HDL 49 05/30/2019     Lab Results   Component Value Date    LDL 81 05/30/2019     Lab Results   Component Value Date    TRIG 152 05/30/2019     Lab Results   Component Value Date    CHOLHDLRATIO 2.5 03/25/2015     Lab Results   Component Value Date    NHDL 107 03/16/2016       Is the patient on a Statin? YES             Is the patient on Aspirin? YES    Medications     HMG CoA Reductase Inhibitors     simvastatin (ZOCOR) 80 MG tablet        STATIN " NOT PRESCRIBED (INTENTIONAL)       Salicylates     ASPIRIN 81 MG OR TABS             Last three blood pressure readings:  BP Readings from Last 3 Encounters:   01/28/20 128/62   10/15/19 126/72   06/27/19 132/64       Date of last diabetes office visit: 10/15/19     Tobacco History:     History   Smoking Status     Never Smoker   Smokeless Tobacco     Never Used     Comment: never           Composite cancer screening  Chart review shows that this patient is due/due soon for the following None

## 2020-06-11 NOTE — TELEPHONE ENCOUNTER
"LMTC, please see message below and assist in scheduling   Thanks  Teresita Padron RT (R)       Action needed:   Patient needs virtual visit for Medicare Physical/Medication Recheck (If patient can not do \"Virtual Visit\" can do telephone for \"diabetes/mood\" recheck.  Patient needs fasting lab only appointment  "

## 2020-06-22 DIAGNOSIS — E11.9 TYPE 2 DIABETES MELLITUS WITHOUT COMPLICATION, WITHOUT LONG-TERM CURRENT USE OF INSULIN (H): Primary | ICD-10-CM

## 2020-06-22 DIAGNOSIS — E11.9 TYPE 2 DIABETES MELLITUS WITHOUT COMPLICATION, WITHOUT LONG-TERM CURRENT USE OF INSULIN (H): ICD-10-CM

## 2020-06-22 DIAGNOSIS — E78.5 HYPERLIPIDEMIA LDL GOAL <100: ICD-10-CM

## 2020-06-22 LAB
ALBUMIN SERPL-MCNC: 3.9 G/DL (ref 3.4–5)
ALP SERPL-CCNC: 74 U/L (ref 40–150)
ALT SERPL W P-5'-P-CCNC: 24 U/L (ref 0–70)
ANION GAP SERPL CALCULATED.3IONS-SCNC: 4 MMOL/L (ref 3–14)
AST SERPL W P-5'-P-CCNC: 20 U/L (ref 0–45)
BILIRUB SERPL-MCNC: 0.3 MG/DL (ref 0.2–1.3)
BUN SERPL-MCNC: 25 MG/DL (ref 7–30)
CALCIUM SERPL-MCNC: 8.5 MG/DL (ref 8.5–10.1)
CHLORIDE SERPL-SCNC: 105 MMOL/L (ref 94–109)
CHOLEST SERPL-MCNC: 140 MG/DL
CO2 SERPL-SCNC: 29 MMOL/L (ref 20–32)
CREAT SERPL-MCNC: 1.05 MG/DL (ref 0.66–1.25)
CREAT UR-MCNC: 170 MG/DL
GFR SERPL CREATININE-BSD FRML MDRD: 66 ML/MIN/{1.73_M2}
GLUCOSE SERPL-MCNC: 213 MG/DL (ref 70–99)
HDLC SERPL-MCNC: 42 MG/DL
LDLC SERPL CALC-MCNC: 40 MG/DL
MICROALBUMIN UR-MCNC: 50 MG/L
MICROALBUMIN/CREAT UR: 29.47 MG/G CR (ref 0–17)
NONHDLC SERPL-MCNC: 98 MG/DL
POTASSIUM SERPL-SCNC: 5 MMOL/L (ref 3.4–5.3)
PROT SERPL-MCNC: 7.5 G/DL (ref 6.8–8.8)
SODIUM SERPL-SCNC: 138 MMOL/L (ref 133–144)
TRIGL SERPL-MCNC: 292 MG/DL

## 2020-06-22 PROCEDURE — 36415 COLL VENOUS BLD VENIPUNCTURE: CPT | Performed by: PHYSICIAN ASSISTANT

## 2020-06-22 PROCEDURE — 80053 COMPREHEN METABOLIC PANEL: CPT | Performed by: PHYSICIAN ASSISTANT

## 2020-06-22 PROCEDURE — 80061 LIPID PANEL: CPT | Performed by: PHYSICIAN ASSISTANT

## 2020-06-22 PROCEDURE — 82043 UR ALBUMIN QUANTITATIVE: CPT | Performed by: PHYSICIAN ASSISTANT

## 2020-06-23 DIAGNOSIS — I65.21 CAROTID STENOSIS, ASYMPTOMATIC, RIGHT: ICD-10-CM

## 2020-06-24 RX ORDER — CLOPIDOGREL BISULFATE 75 MG/1
75 TABLET ORAL DAILY
Qty: 90 TABLET | Refills: 1 | Status: SHIPPED | OUTPATIENT
Start: 2020-06-24 | End: 2020-12-18

## 2020-06-24 NOTE — TELEPHONE ENCOUNTER
Pending Prescriptions:                       Disp   Refills    clopidogrel (PLAVIX) 75 MG tablet          90 tab*1        Sig: Take 1 tablet (75 mg) by mouth daily      Routing refill request to provider for review/approval because:  Labs out of range:  hgb    Teri Rodrigez, MSN, RN

## 2020-06-29 ENCOUNTER — TELEPHONE (OUTPATIENT)
Dept: FAMILY MEDICINE | Facility: OTHER | Age: 85
End: 2020-06-29

## 2020-06-29 NOTE — TELEPHONE ENCOUNTER
Spoke to patient, message below was given. Patient will try to get in soon for A1C as he is still working.   Melvi Cespedes CMA ([facility])    Your triglycerides are quite high and it may be wise to begin to take medication to lower them.  First step of course would be to take a careful look at your diet for foods that are high in triglycerides.   Your microalbumin is stable.   For some reason there was not a hemoglobin A1c done.  I think it would be wise for us to draw additional labs to check that.  Apologize that that was not drawn for you when you have these done.  Please arrange to have your hemoglobin A1c done in the very near future.   Electronically signed:       Adam Carvajal PA-C

## 2020-07-30 ENCOUNTER — TELEPHONE (OUTPATIENT)
Dept: FAMILY MEDICINE | Facility: OTHER | Age: 85
End: 2020-07-30

## 2020-07-30 DIAGNOSIS — E11.9 TYPE 2 DIABETES MELLITUS WITHOUT COMPLICATION, WITHOUT LONG-TERM CURRENT USE OF INSULIN (H): ICD-10-CM

## 2020-07-30 LAB — HBA1C MFR BLD: 8.6 % (ref 0–5.6)

## 2020-07-30 PROCEDURE — 36415 COLL VENOUS BLD VENIPUNCTURE: CPT | Performed by: PHYSICIAN ASSISTANT

## 2020-07-30 PROCEDURE — 83036 HEMOGLOBIN GLYCOSYLATED A1C: CPT | Performed by: PHYSICIAN ASSISTANT

## 2020-07-30 NOTE — TELEPHONE ENCOUNTER
LMTC: Please see message below.  Melvi Cespedes CMA ([facility])      His diabetes is not in good control.  Would have him make a face-to-face visit so they can review his medication and discuss adjustment in meds.   We should consider medications like metformin or glyburide etc. related to his diabetes.  If I am reviewing the record correctly it would appear that he is still on metformin though since that prescription is over 4 years old I am not certain where he would be getting that [facility] than [facility].  Please encourage an office visit.   Electronically signed:       Adam Carvajal PA-C

## 2020-07-30 NOTE — PROGRESS NOTES
Subjective     Roque Mckeon is a 84 year old male who presents to clinic today for the following health issues:    [facility]       Diabetes Follow-up    How often are you checking your blood sugar? A few times a week  What time of day are you checking your blood sugars (select all that apply)?  Before meals  Have you had any blood sugars above 200?  Yes   Have you had any blood sugars below 70?  No    What symptoms do you notice when your blood sugar is low?  Not applicable    What concerns do you have today about your diabetes? Blood sugar is often over 200     Do you have any of these symptoms? (Select all that apply)  No numbness or tingling in feet.  No redness, sores or blisters on feet.  No complaints of excessive thirst.  No reports of blurry vision.  No significant changes to weight.    Have you had a diabetic eye exam in the last 12 months? Yes- Date of last eye exam: August. Has another one scheduled.       Hyperlipidemia Follow-Up      Are you regularly taking any medication or supplement to lower your cholesterol?   Yes- Simvastatin    Are you having muscle aches or other side effects that you think could be caused by your cholesterol lowering medication?  No    Hypertension Follow-up      Do you check your blood pressure regularly outside of the clinic? Yes     Are you following a low salt diet? No    Are your blood pressures ever more than 140 on the top number (systolic) OR more   than 90 on the bottom number (diastolic), for example 140/90? No    BP Readings from Last 2 Encounters:   07/31/20 124/68   01/28/20 128/62     Hemoglobin A1C (%)   Date Value   07/30/2020 8.6 (H)   01/28/2020 7.8 (H)     LDL Cholesterol Calculated (mg/dL)   Date Value   06/22/2020 40   05/30/2019 81         How many servings of fruits and vegetables do you eat daily?  2-3    On average, how many sweetened beverages do you drink each day (Examples: soda, juice, sweet tea, etc.  Do NOT count diet or  artificially sweetened beverages)?   0    How many days per week do you exercise enough to make your heart beat faster? 5    How many minutes a day do you exercise enough to make your heart beat faster? 60 or more    How many days per week do you miss taking your medication? 0    Patient Active Problem List   Diagnosis     Other left bundle branch block     HYPERLIPIDEMIA LDL GOAL <100     Major depression in complete remission (H)     Advanced directives, counseling/discussion     Encounter for long-term current use of medication     Gout     Type 2 diabetes mellitus without complication, without long-term current use of insulin (H)     Tick-borne disease     Adjustment disorder with mixed anxiety and depressed mood     Anxiety     Other iron deficiency anemia     Past Surgical History:   Procedure Laterality Date     COLONOSCOPY  8/3/2011    Procedure:COMBINED COLONOSCOPY, SINGLE BIOPSY/POLYPECTOMY BY BIOPSY; Surgeon:GRUPO STONER; Location: GI     COLONOSCOPY  2016    [facility] COLONOSCOPY W/WO BRUSH/WASH  02/27/2001    Normal.     HC FRAGMENTING OF KIDNEY STONE  03/05/2003    Left extracorporal shock wave lithotripsy, [facility]     PHACOEMULSIFICATION WITH STANDARD INTRAOCULAR LENS IMPLANT  1/20/2011    PHACOEMULSIFICATION WITH STANDARD INTRAOCULAR LENS IMPLANT performed by OSMAN CHO at  OR       Social History     Tobacco Use     Smoking status: Never Smoker     Smokeless tobacco: Never Used     Tobacco comment: never   Substance Use Topics     Alcohol use: No     Alcohol/week: 0.0 standard drinks     Family History   Problem Relation Age of Onset     Cardiovascular Mother      Arthritis Mother      Heart Disease Mother      Cerebrovascular Disease Mother      Cardiovascular Father      Heart Disease Father      Depression Son      Depression Sister      Genetic Disorder Son      Osteoporosis Sister      Psychotic Disorder Son      Breast Cancer Sister      Alcohol/Drug No family hx of       Circulatory No family hx of      Allergies No family hx of      Alzheimer Disease No family hx of      Blood Disease No family hx of      Cancer No family hx of      Diabetes No family hx of      Gastrointestinal Disease No family hx of      Genitourinary Problems No family hx of      Lipids No family hx of      Neurologic Disorder No family hx of      Thyroid Disease No family hx of          Current Outpatient Medications   Medication Sig Dispense Refill     ACE NOT PRESCRIBED, INTENTIONAL, by Other route continuous prn.  0     ACE/ARB/ARNI NOT PRESCRIBED, INTENTIONAL, Please choose reason not prescribed, below       allopurinol (ZYLOPRIM) 300 MG tablet Take 1 tablet by mouth daily. 90 tablet 3     blood glucose (ONETOUCH ULTRA) test strip USE TO TEST BLOOD SUGAR THREE TIMES A  each 3     blood glucose monitoring (NO BRAND SPECIFIED) meter device kit Use to test blood sugar 1 times daily or as directed. 1 kit 0     blood glucose monitoring (ONE TOUCH ULTRASOFT) lancets test 1 times daily 100 each 3     Blood Glucose Monitoring Suppl MISC 1 strip daily Or as directed. 100 each 0     clopidogrel (PLAVIX) 75 MG tablet Take 1 tablet (75 mg) by mouth daily 90 tablet 1     FISH OIL 1000 MG OR CAPS 1 capsule 2 times per day  0     glipiZIDE (GLUCOTROL) 5 MG tablet Take 2 tablets (10 mg) by mouth 2 times daily (before meals) 360 tablet 1     metFORMIN (GLUCOPHAGE) 1000 MG tablet Take 1,000 mg by mouth 2 times daily (with meals)  90 tablet 3     Multiple Vitamins-Minerals (MULTIVITAMIN & MINERAL PO) Take 1 tablet by mouth daily       PARoxetine (PAXIL) 30 MG tablet TAKE ONE TABLET BY MOUTH ONCE DAILY AT BEDTIME 90 tablet 1     potassium citrate (UROCIT-K 10) 10 MEQ (1080 MG) SR tablet Take 1 tablet by mouth 3 times daily (with meals). 270 tablet 3     simvastatin (ZOCOR) 80 MG tablet Take 0.5 tablets (40 mg) by mouth daily 90 tablet 3     triamcinolone (KENALOG) 0.1 % cream Apply sparingly to affected area three  "times daily for 14 days. 30 g 0     ASPIRIN 81 MG OR TABS 1 tab po QD (Once per day) 0 0     Allergies   Allergen Reactions     No Known Drug Allergies      Recent Labs   Lab Test 07/30/20  1027 06/22/20  1031 01/28/20  1516 10/15/19  0950 05/30/19 01/04/18  0953 05/31/17 03/16/16  1133   A1C 8.6*  --  7.8* 7.9* 8.5*   < > 7.7* 7.7*   < > 7.7*   LDL  --  40  --   --  81  --   --   --   --  72   HDL  --  42  --   --  49  --   --  53  --  53   TRIG  --  292*  --   --  152*  --   --  127  --  173*   ALT  --  24 23 26 20  --  31 38  --  32   CR  --  1.05 1.08 0.90 1.1  --  0.87 1.0  --  0.98   GFRESTIMATED  --  66 64 79 >60   < > 84 >60  --  74   GFRESTBLACK  --  76 74 >90  --    < > >90  --   --  89   POTASSIUM  --  5.0 5.0 4.7 4.8  --  4.5 4.9  --  4.6   TSH  --   --  3.79  --   --   --  2.65  --   --   --     < > = values in this interval not displayed.      BP Readings from Last 3 Encounters:   07/31/20 124/68   01/28/20 128/62   10/15/19 126/72    Wt Readings from Last 3 Encounters:   07/31/20 80.2 kg (176 lb 12 oz)   01/28/20 82.1 kg (180 lb 14.4 oz)   10/15/19 84.3 kg (185 lb 12.8 oz)                    Reviewed and updated as needed this visit by Provider         Review of Systems   Constitutional, HEENT, cardiovascular, pulmonary, GI, , musculoskeletal, neuro, skin, endocrine and psych systems are negative, except as otherwise noted.      Objective    /68   Pulse 78   Temp 97.9  F (36.6  C) (Temporal)   Resp 16   Ht 1.786 m (5' 10.32\")   Wt 80.2 kg (176 lb 12 oz)   SpO2 98%   BMI 25.13 kg/m    Body mass index is 25.13 kg/m .  Physical Exam   GENERAL: healthy, alert and no distress  NECK: no adenopathy, no asymmetry, masses, or scars and thyroid normal to palpation  RESP: lungs clear to auscultation - no rales, rhonchi or wheezes  CV: regular rate and rhythm, normal S1 S2, no S3 or S4, no murmur, click or rub, no peripheral edema and peripheral pulses strong  ABDOMEN: soft, nontender, no " "hepatosplenomegaly, no masses and bowel sounds normal  MS: no gross musculoskeletal defects noted, no edema  SKIN: no suspicious lesions or rashes to visible skin  NEURO: Normal strength and tone, mentation intact and speech normal  PSYCH: mentation appears normal, affect normal/bright  Diabetic foot exam: normal DP and PT pulses, no trophic changes or ulcerative lesions and normal sensory exam    Diagnostic Test Results:  Labs reviewed in Epic        Assessment & Plan     1. Type 2 diabetes mellitus without complication, without long-term current use of insulin (H)  Increased meds and recheck in 3 months.  - HEMOGLOBIN A1C; Future  - glipiZIDE (GLUCOTROL) 5 MG tablet; Take 2 tablets (10 mg) by mouth 2 times daily (before meals)  Dispense: 360 tablet; Refill: 1    2. Hyperlipidemia LDL goal <100  3. Adjustment disorder with mixed anxiety and depressed mood  Good control at this time.       BMI:   Estimated body mass index is 25.13 kg/m  as calculated from the following:    Height as of this encounter: 1.786 m (5' 10.32\").    Weight as of this encounter: 80.2 kg (176 lb 12 oz).     Work on weight loss  Regular exercise  Return in about 3 months (around 10/31/2020) for Diabetic med check and labs.    Adam Carvajal PA-C  [facility]    "

## 2020-07-30 NOTE — TELEPHONE ENCOUNTER
Notified patient of message below and scheduled him for face to face visit with Adam Winters tomorrow.

## 2020-07-31 ENCOUNTER — OFFICE VISIT (OUTPATIENT)
Dept: FAMILY MEDICINE | Facility: OTHER | Age: 85
End: 2020-07-31
Payer: COMMERCIAL

## 2020-07-31 VITALS
RESPIRATION RATE: 16 BRPM | HEIGHT: 70 IN | SYSTOLIC BLOOD PRESSURE: 124 MMHG | WEIGHT: 176.75 LBS | HEART RATE: 78 BPM | OXYGEN SATURATION: 98 % | DIASTOLIC BLOOD PRESSURE: 68 MMHG | TEMPERATURE: 97.9 F | BODY MASS INDEX: 25.3 KG/M2

## 2020-07-31 DIAGNOSIS — E11.9 TYPE 2 DIABETES MELLITUS WITHOUT COMPLICATION, WITHOUT LONG-TERM CURRENT USE OF INSULIN (H): Primary | ICD-10-CM

## 2020-07-31 DIAGNOSIS — F43.23 ADJUSTMENT DISORDER WITH MIXED ANXIETY AND DEPRESSED MOOD: ICD-10-CM

## 2020-07-31 DIAGNOSIS — E78.5 HYPERLIPIDEMIA LDL GOAL <100: ICD-10-CM

## 2020-07-31 PROCEDURE — 99214 OFFICE O/P EST MOD 30 MIN: CPT | Performed by: PHYSICIAN ASSISTANT

## 2020-07-31 RX ORDER — GLIPIZIDE 5 MG/1
TABLET ORAL
COMMUNITY
End: 2020-07-31

## 2020-07-31 RX ORDER — GLIPIZIDE 5 MG/1
10 TABLET ORAL
Qty: 360 TABLET | Refills: 1 | Status: SHIPPED | OUTPATIENT
Start: 2020-07-31 | End: 2021-11-15

## 2020-07-31 ASSESSMENT — PAIN SCALES - GENERAL: PAINLEVEL: NO PAIN (0)

## 2020-07-31 ASSESSMENT — MIFFLIN-ST. JEOR: SCORE: 1513.06

## 2020-08-20 ENCOUNTER — HOSPITAL ENCOUNTER (OUTPATIENT)
Dept: ULTRASOUND IMAGING | Facility: CLINIC | Age: 85
Discharge: HOME | End: 2020-08-20
Attending: SPECIALIST | Admitting: SPECIALIST
Payer: COMMERCIAL

## 2020-08-20 DIAGNOSIS — I65.21 CAROTID STENOSIS, ASYMPTOMATIC, RIGHT: ICD-10-CM

## 2020-08-20 DIAGNOSIS — I65.22 LEFT CAROTID ARTERY STENOSIS: ICD-10-CM

## 2020-08-20 PROCEDURE — 93880 EXTRACRANIAL BILAT STUDY: CPT

## 2020-08-25 ENCOUNTER — TELEPHONE (OUTPATIENT)
Dept: SURGERY | Facility: CLINIC | Age: 85
End: 2020-08-25

## 2020-08-25 NOTE — TELEPHONE ENCOUNTER
left requesting patient to call and schedule follow-up visit with Dr. Ma- LOV  06-.  When patient calls back , please schedule with Dr. Ma for yearly carotid follow-up............Isabelle Michael CMA  ([facility])

## 2020-09-10 ENCOUNTER — OFFICE VISIT (OUTPATIENT)
Dept: SURGERY | Facility: CLINIC | Age: 85
End: 2020-09-10
Payer: COMMERCIAL

## 2020-09-10 DIAGNOSIS — I65.21 CAROTID STENOSIS, ASYMPTOMATIC, RIGHT: Primary | ICD-10-CM

## 2020-09-10 PROCEDURE — 99213 OFFICE O/P EST LOW 20 MIN: CPT | Performed by: SPECIALIST

## 2020-09-10 NOTE — PROGRESS NOTES
F/U for carotid US -       Subjective:  Patient is an 84-year-old white male who decided to go for [facility] screening due to his mother dying of a stroke around the same age he is at now.  The screening suggested a right carotid artery stenosis for which she is now referred.  He denies any prior stroke, TIA, amaurosis fugax, history of aneurysm, claudication, nonhealing wounds or rest pain.  He has never smoked.   He had an US that suggested bilateral carotid artery stenosis.  He had a confirmatory CTA that revealed a 72% right carotid stenosis. It was decided to manage it non operatively/    He had his 6 month US for which he now follows up.  No neuro sx since last visit.  Currently on plavix     Objective:  B/P: Data Unavailable, T: Data Unavailable, P: Data Unavailable, R: Data Unavailable  Neck: Supple, No JVD, no bruits  Abd: Soft, non tender, non distended, no bruits or masses  Ext; Warm, no edema, Rt - (+)FEM/DP pulses, Lt (+)FEM/PT pulses      US -   US CAROTID BILATERAL 10/31/2018 11:34 AM     HISTORY: Right carotid bruit.     COMPARISON: None.     FINDINGS:      Right side:   On the grayscale images, there is severe mixed plaque at the carotid  bifurcation extending into the internal carotid artery.  Spectral waveform analysis was performed. Peak systolic and diastolic  velocities in the right internal carotid artery are 582 and 137 cm/s.  Per sonographic criteria, degree of stenosis in the right internal  carotid artery is greater than or equal to 70%/near occlusion..  Flow in the right vertebral artery is antegrade.      Left side:   On the grayscale images, there is mixed plaque at the carotid  bifurcation extending into the internal carotid artery.  Spectral waveform analysis was performed. Peak systolic and diastolic   velocities in the left internal carotid artery are 147 and 29 cm/s.  Per sonographic criteria, degree of stenosis in the left internal  carotid artery is 50-69%.  Flow in the left  vertebral artery is antegrade.          IMPRESSION: Per sonographic criteria, there is a near occlusion of the  right internal carotid artery. There is a 50-69% stenosis of the left  internal carotid artery.     Recommend further evaluation by vascular surgery.     Degree of stenosis measured relative to the diameter of the normal  internal carotid artery per NASCET or NASCET type criteria     JONE SMITH MD      CTA -   CTA NECK WITH CONTRAST November 5, 2018 10:45 AM      HISTORY: Carotid stenosis, asymptomatic, right.     TECHNIQUE: Precontrast localizing scans were followed by CT  angiography with an injection of 70mL, Isovue 370 IV contrast with  scans through the neck.  Images were transferred to a separate 3-D  workstation where multiplanar reformations and 3-D images were  created. Estimates of carotid stenoses are made relative to the distal  internal carotid artery diameters except as noted. Radiation dose for  this scan was reduced using automated exposure control, adjustment of  the mA and/or kV according to patient size, or iterative  reconstruction technique.     COMPARISON: Ultrasound exam dated 10/31/2018.     FINDINGS: Estimates of stenosis at the carotid bifurcations are  relative to the distal internal carotids.     Arch: Normal Anatomy.     Neck:  Right Carotid: The right common carotid artery is normal. Calcified  and noncalcified atherosclerotic plaque is seen at the right carotid  bifurcation. The residual lumen at the point of maximum stenosis is  approximately 1.5 mm. The more distal internal carotid measures about  5.3 mm. The stenosis was calculated at about 72%. The right internal  carotid artery is negative.      Left Carotid: The left common carotid artery is unremarkable.  Atherosclerotic plaque is seen at the left carotid bifurcation. The  minimum diameter was calculated at 4.2 mm. The more distal internal  carotid measures about 6.2 mm. The stenosis was calculated at 31%.   The  left internal carotid is negative.       Vertebrals: The vertebral arteries are normal.            IMPRESSION:   1. High-grade, 72% stenosis right carotid bifurcation.  2. Less than 50% stenosis left carotid bifurcation.     SYED PAINTING MD    US -   BILATERAL CAROTID ULTRASOUND June 17, 2019 8:34 AM      HISTORY: Carotid stenosis, asymptomatic, right.     COMPARISON: October 31, 2018.     RIGHT CAROTID FINDINGS: There is a large degree of atherosclerotic  plaque at the carotid bifurcation and proximal internal carotid  artery, with relatively smooth margins. Majority of plaque appears to  be soft in consistency.  Right ICA PSV:  534  cm/sec.  Right ICA EDV:  154 cm/sec.  Right ICA/CCA PSV Ratio:  5.9.    These indicate greater than 70% diameter stenosis of the right ICA.    Right Vertebral: Antegrade flow.   Right ECA: Antegrade flow.   Incidental note is made of a solid hypoechoic nodule in the right  thyroid lobe measuring 1.5 x 1.2 x 1.4 cm.     LEFT CAROTID FINDINGS: There is mild atherosclerotic plaque at the  carotid bifurcation and proximal internal carotid artery with  relatively smooth margins.  Left ICA PSV:  165  cm/sec.  Left ICA EDV:  31 cm/sec.  Left ICA/CCA PSV Ratio:  1.3.    These indicate  50 - 69%  diameter stenosis of the left ICA.    Left Vertebral: Antegrade flow.   Left ECA: Antegrade flow.      Causes of Decreased Accuracy:   None.                                                                       IMPRESSION:    1. Greater than 70% relatively unchanged diameter stenosis of the  right internal carotid artery relative to the distal internal carotid  artery diameter. Given such a significant degree of velocity  elevation, the degree of stenosis is trending toward near occlusion,  though again this is relatively unchanged from previous exam.  2. 50-69% unchanged diameter stenosis of the left internal carotid  artery relative to the distal internal carotid artery diameter.   3. Solid hypoechoic  right thyroid lobe nodule is 1.5 x 1.2 x 1.4 cm.     DINO DIAZ MD         CTA NECK WITH CONTRAST 2/3/2020 10:42 AM      HISTORY: Arterial stricture / occlusion, head and neck. Carotid  stenosis, asymptomatic, right.     TECHNIQUE: Axial images were obtained through the neck with  intravenous contrast. 80 mL of Isovue-370 was given. Multiplanar  reconstructions were performed. 3-D reconstructions off a remote work  station for CT angiography were also acquired. Carotid stenoses were  evaluated by comparing the caliber of the proximal internal carotid  artery to the caliber of the distal internal carotid artery. Radiation  dose for this scan was reduced using automated exposure control,  adjustment of the mA and/or kV according to patient size, or iterative  reconstruction technique.     COMPARISON: 11/5/2018.     FINDINGS:     Brachiocephalic vessels: There is some minimal calcific plaque. There  is no stenosis.     Right carotid system: Moderate-to-severe calcified and noncalcified  plaque in proximal internal carotid artery is present resulting in 75%  stenosis which is similar to that seen on the prior exams. This is a  high-grade stenosis. The plaque is minimally irregular.     Left carotid system: There is some mild calcific plaque in the  internal carotid artery without any significant stenosis.     Right vertebral artery: Minimal calcific plaque seen distally. There  is no stenosis or dissection.     Left vertebral artery: Minimal plaque near the origin. No stenosis or  dissection.                                                                      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     KASIA CAT MD    US CAROTID BILATERAL 8/20/2020 9:18 AM     CLINICAL HISTORY: Carotid stenosis, asymptomatic, right; Left carotid  artery stenosis  TECHNIQUE:  Duplex exam performed utilizing 2D gray-scale imaging,  Doppler interrogation with color-flow and spectral waveform analysis.     COMPARISON: Ultrasound 1/8/2020, 6/17/2019, CTA neck 2/3/2020     FINDINGS:  RIGHT: There is severe atheromatous plaque at the carotid bifurcation  and proximal right internal carotid artery. There are markedly  elevated velocities at the proximal aspect.     LEFT: There is mild calcified and noncalcified atheromatous plaque.  Normal waveforms with no significant stenosis.     Both vertebral arteries and subclavian artery waveforms are normal.     VELOCITY CHART:  The following velocities were obtained in the RIGHT carotid system.  CCA: 76 cm/s  ICA: 519 cm/s  ECA: 169 cm/s  ICA/CCA: PS 7.0     The following velocities were obtained in the LEFT carotid system.  CCA: 102 cm/s  ICA: 145 cm/s  ECA: 353 cm/s  ICA/CCA: PS 1.4                                                                      IMPRESSION:  1. Greater than 70% stenosis of the proximal right internal carotid  artery.  2. 50-69% stenosis of the proximal left internal carotid artery.     Evaluation based on velocities and NASCET criteria.     MAKAYLA BARTON MD      Assessment/Plan:  This is an 84 year-old gentleman with an ultrasound suggestive of a High grade right carotid artery stenosis and >70% left carotid artery stenosis that is asymptomatic.  CTA revealed only a 75% stenosis on the right a <50 on the left.   He is asymptomatic at this time.   His right-sided stenosis is stable based on velocity criteria on today's visit.      I discussed the role of endarterectomy versus medical management in patients in this age group.  As he is asymptomatic at this time I recommended we watch him out to 80% stenosis and he is in agreement with that plan.  He is on a statin and his blood sugars are controlled and he does not smoke.  He is in agreement with that plan.    The plan at time is for medical management with the addition of  Plavix to repeat ultrasound in 6 months.  He will follow-up with me after his ultrasound.  Knows to be seen sooner should he develop symptoms.     Phillip Ma MD, FACS

## 2020-09-10 NOTE — LETTER
9/10/2020         RE: Roque Mckeon  [address]   [place] [address]        Dear Colleague,    Thank you for referring your patient, Roque Mckeon, to the [facility]. Please see a copy of my visit note below.    F/U for carotid US -       Subjective:  Patient is an 84-year-old white male who decided to go for [facility] screening due to his mother dying of a stroke around the same age he is at now.  The screening suggested a right carotid artery stenosis for which she is now referred.  He denies any prior stroke, TIA, amaurosis fugax, history of aneurysm, claudication, nonhealing wounds or rest pain.  He has never smoked.   He had an US that suggested bilateral carotid artery stenosis.  He had a confirmatory CTA that revealed a 72% right carotid stenosis. It was decided to manage it non operatively/    He had his 6 month US for which he now follows up.  No neuro sx since last visit.  Currently on plavix     Objective:  B/P: 132/64, T: Data Unavailable, P: 91, R: Data Unavailable  Neck: Supple, No JVD, no bruits  Abd: Soft, non tender, non distended, no bruits or masses  Ext; Warm, no edema, Rt - (+)FEM/DP pulses, Lt (+)FEM/PT pulses      US -   US CAROTID BILATERAL 10/31/2018 11:34 AM     HISTORY: Right carotid bruit.     COMPARISON: None.     FINDINGS:      Right side:   On the grayscale images, there is severe mixed plaque at the carotid  bifurcation extending into the internal carotid artery.  Spectral waveform analysis was performed. Peak systolic and diastolic  velocities in the right internal carotid artery are 582 and 137 cm/s.  Per sonographic criteria, degree of stenosis in the right internal  carotid artery is greater than or equal to 70%/near occlusion..  Flow in the right vertebral artery is antegrade.      Left side:   On the grayscale images, there is mixed plaque at the carotid  bifurcation extending into the internal carotid artery.  Spectral waveform analysis was performed.  Peak systolic and diastolic   velocities in the left internal carotid artery are 147 and 29 cm/s.  Per sonographic criteria, degree of stenosis in the left internal  carotid artery is 50-69%.  Flow in the left vertebral artery is antegrade.          IMPRESSION: Per sonographic criteria, there is a near occlusion of the  right internal carotid artery. There is a 50-69% stenosis of the left  internal carotid artery.     Recommend further evaluation by vascular surgery.     Degree of stenosis measured relative to the diameter of the normal  internal carotid artery per NASCET or NASCET type criteria     JONE SMITH MD      CTA -   CTA NECK WITH CONTRAST November 5, 2018 10:45 AM      HISTORY: Carotid stenosis, asymptomatic, right.     TECHNIQUE: Precontrast localizing scans were followed by CT  angiography with an injection of 70mL, Isovue 370 IV contrast with  scans through the neck.  Images were transferred to a separate 3-D  workstation where multiplanar reformations and 3-D images were  created. Estimates of carotid stenoses are made relative to the distal  internal carotid artery diameters except as noted. Radiation dose for  this scan was reduced using automated exposure control, adjustment of  the mA and/or kV according to patient size, or iterative  reconstruction technique.     COMPARISON: Ultrasound exam dated 10/31/2018.     FINDINGS: Estimates of stenosis at the carotid bifurcations are  relative to the distal internal carotids.     Arch: Normal Anatomy.     Neck:  Right Carotid: The right common carotid artery is normal. Calcified  and noncalcified atherosclerotic plaque is seen at the right carotid  bifurcation. The residual lumen at the point of maximum stenosis is  approximately 1.5 mm. The more distal internal carotid measures about  5.3 mm. The stenosis was calculated at about 72%. The right internal  carotid artery is negative.      Left Carotid: The left common carotid artery is  unremarkable.  Atherosclerotic plaque is seen at the left carotid bifurcation. The  minimum diameter was calculated at 4.2 mm. The more distal internal  carotid measures about 6.2 mm. The stenosis was calculated at 31%.   The left internal carotid is negative.       Vertebrals: The vertebral arteries are normal.            IMPRESSION:   1. High-grade, 72% stenosis right carotid bifurcation.  2. Less than 50% stenosis left carotid bifurcation.     SYED PAINTING MD    US -   BILATERAL CAROTID ULTRASOUND June 17, 2019 8:34 AM      HISTORY: Carotid stenosis, asymptomatic, right.     COMPARISON: October 31, 2018.     RIGHT CAROTID FINDINGS: There is a large degree of atherosclerotic  plaque at the carotid bifurcation and proximal internal carotid  artery, with relatively smooth margins. Majority of plaque appears to  be soft in consistency.  Right ICA PSV:  534  cm/sec.  Right ICA EDV:  154 cm/sec.  Right ICA/CCA PSV Ratio:  5.9.    These indicate greater than 70% diameter stenosis of the right ICA.    Right Vertebral: Antegrade flow.   Right ECA: Antegrade flow.   Incidental note is made of a solid hypoechoic nodule in the right  thyroid lobe measuring 1.5 x 1.2 x 1.4 cm.     LEFT CAROTID FINDINGS: There is mild atherosclerotic plaque at the  carotid bifurcation and proximal internal carotid artery with  relatively smooth margins.  Left ICA PSV:  165  cm/sec.  Left ICA EDV:  31 cm/sec.  Left ICA/CCA PSV Ratio:  1.3.    These indicate  50 - 69%  diameter stenosis of the left ICA.    Left Vertebral: Antegrade flow.   Left ECA: Antegrade flow.      Causes of Decreased Accuracy:   None.                                                                       IMPRESSION:    1. Greater than 70% relatively unchanged diameter stenosis of the  right internal carotid artery relative to the distal internal carotid  artery diameter. Given such a significant degree of velocity  elevation, the degree of stenosis is trending toward near  occlusion,  though again this is relatively unchanged from previous exam.  2. 50-69% unchanged diameter stenosis of the left internal carotid  artery relative to the distal internal carotid artery diameter.   3. Solid hypoechoic right thyroid lobe nodule is 1.5 x 1.2 x 1.4 cm.     DINO DIAZ MD         CTA NECK WITH CONTRAST 2/3/2020 10:42 AM      HISTORY: Arterial stricture / occlusion, head and neck. Carotid  stenosis, asymptomatic, right.     TECHNIQUE: Axial images were obtained through the neck with  intravenous contrast. 80 mL of Isovue-370 was given. Multiplanar  reconstructions were performed. 3-D reconstructions off a remote work  station for CT angiography were also acquired. Carotid stenoses were  evaluated by comparing the caliber of the proximal internal carotid  artery to the caliber of the distal internal carotid artery. Radiation  dose for this scan was reduced using automated exposure control,  adjustment of the mA and/or kV according to patient size, or iterative  reconstruction technique.     COMPARISON: 11/5/2018.     FINDINGS:     Brachiocephalic vessels: There is some minimal calcific plaque. There  is no stenosis.     Right carotid system: Moderate-to-severe calcified and noncalcified  plaque in proximal internal carotid artery is present resulting in 75%  stenosis which is similar to that seen on the prior exams. This is a  high-grade stenosis. The plaque is minimally irregular.     Left carotid system: There is some mild calcific plaque in the  internal carotid artery without any significant stenosis.     Right vertebral artery: Minimal calcific plaque seen distally. There  is no stenosis or dissection.     Left vertebral artery: Minimal plaque near the origin. No stenosis or  dissection.                                                                      IMPRESSION: Persistent high-grade stenosis of the right internal  carotid artery in the 75th percentile range due to  moderate-to-severe  slightly irregular calcified and noncalcified plaque.     KASIA CAT MD    US CAROTID BILATERAL 8/20/2020 9:18 AM     CLINICAL HISTORY: Carotid stenosis, asymptomatic, right; Left carotid  artery stenosis  TECHNIQUE: Duplex exam performed utilizing 2D gray-scale imaging,  Doppler interrogation with color-flow and spectral waveform analysis.     COMPARISON: Ultrasound 1/8/2020, 6/17/2019, CTA neck 2/3/2020     FINDINGS:  RIGHT: There is severe atheromatous plaque at the carotid bifurcation  and proximal right internal carotid artery. There are markedly  elevated velocities at the proximal aspect.     LEFT: There is mild calcified and noncalcified atheromatous plaque.  Normal waveforms with no significant stenosis.     Both vertebral arteries and subclavian artery waveforms are normal.     VELOCITY CHART:  The following velocities were obtained in the RIGHT carotid system.  CCA: 76 cm/s  ICA: 519 cm/s  ECA: 169 cm/s  ICA/CCA: PS 7.0     The following velocities were obtained in the LEFT carotid system.  CCA: 102 cm/s  ICA: 145 cm/s  ECA: 353 cm/s  ICA/CCA: PS 1.4                                                                      IMPRESSION:  1. Greater than 70% stenosis of the proximal right internal carotid  artery.  2. 50-69% stenosis of the proximal left internal carotid artery.     Evaluation based on velocities and NASCET criteria.     MAKAYLA BARTON MD      Assessment/Plan:  This is an 84 year-old gentleman with an ultrasound suggestive of a High grade right carotid artery stenosis and >70% left carotid artery stenosis that is asymptomatic.  CTA revealed only a 75% stenosis on the right a <50 on the left.   He is asymptomatic at this time.   His right-sided stenosis is stable based on velocity criteria on today's visit.      I discussed the role of endarterectomy versus medical management in patients in this age group.  As he is asymptomatic at this time I recommended we watch him out  to 80% stenosis and he is in agreement with that plan.  He is on a statin and his blood sugars are controlled and he does not smoke.  He is in agreement with that plan.    The plan at time is for medical management with the addition of Plavix to repeat ultrasound in 6 months.  He will follow-up with me after his ultrasound.  Knows to be seen sooner should he develop symptoms.     Phillip Ma MD, FACS      Again, thank you for allowing me to participate in the care of your patient.        Sincerely,        Phillip Ma MD

## 2020-10-29 DIAGNOSIS — E11.9 TYPE 2 DIABETES MELLITUS WITHOUT COMPLICATION, WITHOUT LONG-TERM CURRENT USE OF INSULIN (H): ICD-10-CM

## 2020-10-29 RX ORDER — LANCETS
EACH MISCELLANEOUS
Qty: 100 EACH | Refills: 3 | Status: SHIPPED | OUTPATIENT
Start: 2020-10-29 | End: 2021-11-15

## 2020-10-29 NOTE — TELEPHONE ENCOUNTER
Prescription approved per [facility] [place].  Navid Tan RN  October 29, 2020

## 2020-11-06 DIAGNOSIS — F32.A ANXIETY AND DEPRESSION: ICD-10-CM

## 2020-11-06 DIAGNOSIS — F41.9 ANXIETY AND DEPRESSION: ICD-10-CM

## 2020-11-06 RX ORDER — PAROXETINE 30 MG/1
TABLET, FILM COATED ORAL
Qty: 90 TABLET | Refills: 0 | Status: SHIPPED | OUTPATIENT
Start: 2020-11-06 | End: 2020-12-18

## 2020-11-06 NOTE — TELEPHONE ENCOUNTER
Pending Prescriptions:                       Disp   Refills    PARoxetine (PAXIL) 30 MG tablet [Pharmacy*90 tab*1            Sig: TAKE ONE TABLET BY MOUTH ONCE DAILY    Medication is being filled for 1 time sandie refill only due to:  Patient is due for DM check.     Please call and help schedule.  Thank you!    Navid Tan RN  November 6, 2020

## 2020-11-19 ENCOUNTER — TRANSFERRED RECORDS (OUTPATIENT)
Dept: HEALTH INFORMATION MANAGEMENT | Facility: CLINIC | Age: 85
End: 2020-11-19

## 2020-11-19 LAB — RETINOPATHY: NEGATIVE

## 2020-12-10 ENCOUNTER — TELEPHONE (OUTPATIENT)
Dept: FAMILY MEDICINE | Facility: OTHER | Age: 85
End: 2020-12-10

## 2020-12-10 NOTE — TELEPHONE ENCOUNTER
Summary:    Patient is due/failing the following:   Diabetic follow up, medicare annual wellness visit, PHQ9 and A1C    Action needed:   Patient needs office visit for follow up and medicare annual wellness visit. and Patient needs to do PHQ9.    Type of outreach:    Phone, left message for patient to call back.     Questions for provider review:    None                                                                                                                                    Jami Gonzalez, [facility]       Chart routed to Care Team .          Panel Management Review      Patient has the following on his problem list:     Depression / Dysthymia review    Measure:  Needs PHQ-9 score of 4 or less during index window.  Administer PHQ-9 and if score is 5 or more, send encounter to provider for next steps.        PHQ-9 SCORE 10/29/2018 10/15/2019 4/2/2020   PHQ-9 Total Score - - -   PHQ-9 Total Score MyChart 2 (Minimal depression) 1 (Minimal depression) -   PHQ-9 Total Score 2 1 1       If PHQ-9 recheck is 5 or more, route to provider for next steps.    Patient is due for:  PHQ9    Diabetes    ASA: Passed    Last A1C  Lab Results   Component Value Date    A1C 8.6 07/30/2020    A1C 7.8 01/28/2020    A1C 7.9 10/15/2019    A1C 8.5 05/30/2019    A1C 7.3 07/31/2018     A1C tested: FAILED    Last LDL:    Lab Results   Component Value Date    CHOL 140 06/22/2020     Lab Results   Component Value Date    HDL 42 06/22/2020     Lab Results   Component Value Date    LDL 40 06/22/2020     Lab Results   Component Value Date    TRIG 292 06/22/2020     Lab Results   Component Value Date    CHOLHDLRATIO 2.5 03/25/2015     Lab Results   Component Value Date    NHDL 98 06/22/2020       Is the patient on a Statin? YES             Is the patient on Aspirin? YES    Medications     HMG CoA Reductase Inhibitors     simvastatin (ZOCOR) 80 MG tablet       Salicylates     ASPIRIN 81 MG OR TABS             Last three blood pressure readings:  BP  Readings from Last 3 Encounters:   07/31/20 124/68   01/28/20 128/62   10/15/19 126/72          Tobacco History:     History   Smoking Status     Never Smoker   Smokeless Tobacco     Never Used     Comment: never         Hypertension   Last three blood pressure readings:  BP Readings from Last 3 Encounters:   07/31/20 124/68   01/28/20 128/62   10/15/19 126/72     Blood pressure: Passed    HTN Guidelines:  Less than 140/90      Composite cancer screening  Chart review shows that this patient is due/due soon for the following None

## 2020-12-17 NOTE — PROGRESS NOTES
"SUBJECTIVE:   Roque Mckeon is a 85 year old male who presents for Preventive Visit.      Patient has been advised of split billing requirements and indicates understanding: Yes   Are you in the first 12 months of your Medicare coverage?  No    Healthy Habits:     In general, how would you rate your overall health?  Good    Frequency of exercise:  None    Do you usually eat at least 4 servings of fruit and vegetables a day, include whole grains    & fiber and avoid regularly eating high fat or \"junk\" foods?  Yes    Taking medications regularly:  Yes    Barriers to taking medications:  None    Medication side effects:  None    Ability to successfully perform activities of daily living:  No assistance needed    Home Safety:  No safety concerns identified    Hearing Impairment:  Feel that people are mumbling or not speaking clearly, difficult to understand a speaker at a public meeting or [employer] service, need to ask people to speak up or repeat themselves and find that men's voices are easier to understand than woman's    In the past 6 months, have you been bothered by leaking of urine? Yes    In general, how would you rate your overall mental or emotional health?  Good      PHQ-2 Total Score: 0    Additional concerns today:  Yes (hearing concerns)    Do you feel safe in your environment? Yes    Have you ever done Advance Care Planning? (For example, a Health Directive, POLST, or a discussion with a medical provider or your loved ones about your wishes): No, advance care planning information given to patient to review.  Patient plans to discuss their wishes with loved ones or provider.      Fall risk  Fallen 2 or more times in the past year?: (P) No  Any fall with injury in the past year?: (P) No    Cognitive Screening   1) Repeat 3 items (Leader, Season, Table)    2) Clock draw:   NORMAL  3) 3 item recall: Recalls 1 object   Results: NORMAL clock, 1-2 items recalled: COGNITIVE IMPAIRMENT LESS " LIKELY    Mini-CogTM Copyright REINA Payne. Licensed by the author for use in [facility]; reprinted with permission ([email or web address]@[email or web address]). All rights reserved.      Do you have sleep apnea, excessive snoring or daytime drowsiness?: no    Reviewed and updated as needed this visit by clinical staff                 Reviewed and updated as needed this visit by Provider                Social History     Tobacco Use     Smoking status: Never Smoker     Smokeless tobacco: Never Used     Tobacco comment: never   Substance Use Topics     Alcohol use: No     Alcohol/week: 0.0 standard drinks     If you drink alcohol do you typically have >3 drinks per day or >7 drinks per week? No    Alcohol Use 3/16/2016   Prescreen: >3 drinks/day or >7 drinks/week? The patient does not drink >3 drinks per day nor >7 drinks per week.           Current providers sharing in care for this patient include:     Patient Care Team:  Adam Marc PA-C as PCP - General (Physician Assistant)  Adam Marc PA-C as Assigned PCP  Phillip Ma MD as Assigned Surgical Provider    The following health maintenance items are reviewed in Epic and correct as of today:  Health Maintenance   Topic Date Due     ZOSTER IMMUNIZATION (2 of 3) 06/02/2012     ADVANCE CARE PLANNING  11/09/2016     MEDICARE ANNUAL WELLNESS VISIT  03/16/2017     FALL RISK ASSESSMENT  07/31/2019     INFLUENZA VACCINE (1) 09/01/2020     PHQ-9  09/23/2020     A1C  01/30/2021     BMP  06/22/2021     CMP  06/22/2021     LIPID  06/22/2021     MICROALBUMIN  06/22/2021     DIABETIC FOOT EXAM  07/31/2021     EYE EXAM  11/19/2021     DTAP/TDAP/TD IMMUNIZATION (4 - Td) 01/04/2028     DEPRESSION ACTION PLAN  Completed     Pneumococcal Vaccine: 65+ Years  Completed     Pneumococcal Vaccine: Pediatrics (0 to 5 Years) and At-Risk Patients (6 to 64 Years)  Aged Out     IPV IMMUNIZATION  Aged Out     MENINGITIS IMMUNIZATION  Aged Out     Lab work is in process  Labs reviewed in EPIC  BP  Readings from Last 3 Encounters:   12/18/20 122/74   07/31/20 124/68   01/28/20 128/62    Wt Readings from Last 3 Encounters:   12/18/20 81.2 kg (179 lb)   07/31/20 80.2 kg (176 lb 12 oz)   01/28/20 82.1 kg (180 lb 14.4 oz)                  Patient Active Problem List   Diagnosis     Other left bundle branch block     HYPERLIPIDEMIA LDL GOAL <100     Major depression in complete remission (H)     Advanced directives, counseling/discussion     Screening for prostate cancer     Gout     Type 2 diabetes mellitus without complication, without long-term current use of insulin (H)     Tick-borne disease     Adjustment disorder with mixed anxiety and depressed mood     Anxiety     Other iron deficiency anemia     Carotid stenosis, right     Past Surgical History:   Procedure Laterality Date     COLONOSCOPY  8/3/2011    Procedure:COMBINED COLONOSCOPY, SINGLE BIOPSY/POLYPECTOMY BY BIOPSY; Surgeon:GRUPO STONER; Location: GI     COLONOSCOPY  2016    [facility] COLONOSCOPY W/WO BRUSH/WASH  02/27/2001    Normal.     HC FRAGMENTING OF KIDNEY STONE  03/05/2003    Left extracorporal shock wave lithotripsy, [facility]     PHACOEMULSIFICATION WITH STANDARD INTRAOCULAR LENS IMPLANT  1/20/2011    PHACOEMULSIFICATION WITH STANDARD INTRAOCULAR LENS IMPLANT performed by OSMAN CHO at  OR       Social History     Tobacco Use     Smoking status: Never Smoker     Smokeless tobacco: Never Used     Tobacco comment: never   Substance Use Topics     Alcohol use: No     Alcohol/week: 0.0 standard drinks     Family History   Problem Relation Age of Onset     Cardiovascular Mother      Arthritis Mother      Heart Disease Mother      Cerebrovascular Disease Mother      Cardiovascular Father      Heart Disease Father      Depression Son      Depression Sister      Genetic Disorder Son      Osteoporosis Sister      Psychotic Disorder Son      Breast Cancer Sister      Alcohol/Drug No family hx of      Circulatory No family hx of       Allergies No family hx of      Alzheimer Disease No family hx of      Blood Disease No family hx of      Cancer No family hx of      Diabetes No family hx of      Gastrointestinal Disease No family hx of      Genitourinary Problems No family hx of      Lipids No family hx of      Neurologic Disorder No family hx of      Thyroid Disease No family hx of          Current Outpatient Medications   Medication Sig Dispense Refill     ACE NOT PRESCRIBED, INTENTIONAL, by Other route continuous prn.  0     ACE/ARB/ARNI NOT PRESCRIBED, INTENTIONAL, Please choose reason not prescribed, below       allopurinol (ZYLOPRIM) 300 MG tablet Take 1 tablet by mouth daily. 90 tablet 3     ASPIRIN 81 MG OR TABS 1 tab po QD (Once per day) 0 0     blood glucose (ONETOUCH ULTRA) test strip USE TO TEST BLOOD SUGAR THREE TIMES A  each 3     blood glucose monitoring (NO BRAND SPECIFIED) meter device kit Use to test blood sugar 1 times daily or as directed. 1 kit 0     blood glucose monitoring (ONE TOUCH ULTRASOFT) lancets test 1 times daily 100 each 3     Blood Glucose Monitoring Suppl MISC 1 strip daily Or as directed. 100 each 0     clopidogrel (PLAVIX) 75 MG tablet Take 1 tablet (75 mg) by mouth daily 90 tablet 1     FISH OIL 1000 MG OR CAPS 1 capsule 2 times per day  0     glipiZIDE (GLUCOTROL) 5 MG tablet Take 2 tablets (10 mg) by mouth 2 times daily (before meals) 360 tablet 1     metFORMIN (GLUCOPHAGE) 1000 MG tablet Take 1,000 mg by mouth 2 times daily (with meals)  90 tablet 3     Multiple Vitamins-Minerals (MULTIVITAMIN & MINERAL PO) Take 1 tablet by mouth daily       PARoxetine (PAXIL) 30 MG tablet TAKE ONE TABLET BY MOUTH ONCE DAILY 90 tablet 1     potassium citrate (UROCIT-K) 10 MEQ (1080 MG) CR tablet Take 1 tablet (10 mEq) by mouth 3 times daily (with meals) 270 tablet 3     simvastatin (ZOCOR) 80 MG tablet Take 0.5 tablets (40 mg) by mouth daily 90 tablet 3     triamcinolone (KENALOG) 0.1 % cream Apply sparingly to  "affected area three times daily for 14 days. 30 g 0     Allergies   Allergen Reactions     No Known Drug Allergies      Recent Labs   Lab Test 07/30/20  1027 06/22/20  1031 01/28/20  1516 10/15/19  0950 05/30/19 01/04/18  0953 01/04/18  0953 05/31/17 03/16/16  1133 03/16/16  1133   A1C 8.6*  --  7.8* 7.9* 8.5*   < > 7.7* 7.7*   < > 7.7*   LDL  --  40  --   --  81  --   --   --   --  72   HDL  --  42  --   --  49  --   --  53  --  53   TRIG  --  292*  --   --  152*  --   --  127  --  173*   ALT  --  24 23 26 20  --  31 38  --  32   CR  --  1.05 1.08 0.90 1.1  --  0.87 1.0  --  0.98   GFRESTIMATED  --  66 64 79 >60   < > 84 >60  --  74   GFRESTBLACK  --  76 74 >90  --    < > >90  --   --  89   POTASSIUM  --  5.0 5.0 4.7 4.8  --  4.5 4.9  --  4.6   TSH  --   --  3.79  --   --   --  2.65  --   --   --     < > = values in this interval not displayed.      Review of Systems   Constitutional: Negative for chills and fever.   HENT: Positive for hearing loss. Negative for congestion, ear pain and sore throat.    Eyes: Negative for pain and visual disturbance.   Respiratory: Negative for cough and shortness of breath.    Cardiovascular: Negative for chest pain, palpitations and peripheral edema.   Gastrointestinal: Negative for abdominal pain, constipation, diarrhea, heartburn, hematochezia and nausea.   Genitourinary: Negative for dysuria, frequency, genital sores, hematuria and urgency.   Musculoskeletal: Negative for arthralgias, joint swelling and myalgias.   Skin: Negative for rash.   Neurological: Negative for dizziness, weakness, headaches and paresthesias.   Psychiatric/Behavioral: Negative for mood changes. The patient is not nervous/anxious.        OBJECTIVE:   /74   Pulse 86   Temp 97.9  F (36.6  C) (Temporal)   Resp 16   Wt 81.2 kg (179 lb)   SpO2 97%   BMI 25.45 kg/m   Estimated body mass index is 25.45 kg/m  as calculated from the following:    Height as of 7/31/20: 1.786 m (5' 10.32\").    Weight as " of this encounter: 81.2 kg (179 lb).  Physical Exam  GENERAL: healthy, alert and no distress  EYES: Eyes grossly normal to inspection, PERRL and conjunctivae and sclerae normal  HENT: ear canals and TM's normal, nose and mouth without ulcers or lesions  NECK: no adenopathy, no asymmetry, masses, or scars and thyroid normal to palpation  RESP: lungs clear to auscultation - no rales, rhonchi or wheezes  CV: regular rate and rhythm, normal S1 S2, no S3 or S4, no murmur, click or rub, no peripheral edema and peripheral pulses strong  ABDOMEN: soft, nontender, no hepatosplenomegaly, no masses and bowel sounds normal  MS: no gross musculoskeletal defects noted, no edema  SKIN: no suspicious lesions or rashes  NEURO: Normal strength and tone, mentation intact and speech normal  PSYCH: mentation appears normal, affect normal/bright    Diagnostic Test Results:  Labs reviewed in Epic    ASSESSMENT / PLAN:   1. Need for prophylactic vaccination and inoculation against influenza  States he has had this already    2. Calculus of kidney  - potassium citrate (UROCIT-K) 10 MEQ (1080 MG) CR tablet; Take 1 tablet (10 mEq) by mouth 3 times daily (with meals)  Dispense: 270 tablet; Refill: 3    3. Encounter for immunization   declined    4. Type 2 diabetes mellitus without complication, without long-term current use of insulin (H)  - PSA, screen  - Hemoglobin A1c  - Comprehensive metabolic panel  - LDL cholesterol direct  - blood glucose (ONETOUCH ULTRA) test strip; USE TO TEST BLOOD SUGAR THREE TIMES A DAY  Dispense: 100 each; Refill: 3    5. Hyperlipidemia LDL goal <100  - PSA, screen  - Hemoglobin A1c  - Comprehensive metabolic panel  - LDL cholesterol direct    6. Screening for prostate cancer    7. Encounter for screening for malignant neoplasm of prostate   - PSA, screen    8. Acute idiopathic gout, unspecified site  - Uric acid    9. Carotid stenosis, right    10. Anxiety and depression  - PARoxetine (PAXIL) 30 MG tablet; TAKE  "ONE TABLET BY MOUTH ONCE DAILY  Dispense: 90 tablet; Refill: 1    11. Carotid stenosis, asymptomatic, right  - clopidogrel (PLAVIX) 75 MG tablet; Take 1 tablet (75 mg) by mouth daily  Dispense: 90 tablet; Refill: 1    12. Hearing deficit, bilateral  - AUDIOLOGY ADULT REFERRAL; Future    Patient has been advised of split billing requirements and indicates understanding: Yes  COUNSELING:  Reviewed preventive health counseling, as reflected in patient instructions       Regular exercise       Healthy diet/nutrition       Vision screening       Hearing screening       Dental care       Bladder control       Fall risk prevention    Estimated body mass index is 25.13 kg/m  as calculated from the following:    Height as of 7/31/20: 1.786 m (5' 10.32\").    Weight as of 7/31/20: 80.2 kg (176 lb 12 oz).        He reports that he has never smoked. He has never used smokeless tobacco.      Appropriate preventive services were discussed with this patient, including applicable screening as appropriate for cardiovascular disease, diabetes, osteopenia/osteoporosis, and glaucoma.  As appropriate for age/gender, discussed screening for colorectal cancer, prostate cancer, breast cancer, and cervical cancer. Checklist reviewing preventive services available has been given to the patient.    Reviewed patients plan of care and provided an AVS. The Basic Care Plan (routine screening as documented in Health Maintenance) for Roque meets the Care Plan requirement. This Care Plan has been established and reviewed with the Patient.    Counseling Resources:  ATP IV Guidelines  Pooled Cohorts Equation Calculator  Breast Cancer Risk Calculator  Breast Cancer: Medication to Reduce Risk  FRAX Risk Assessment  ICSI Preventive Guidelines  Dietary Guidelines for Americans, 2010  [employer] MyPlate  ASA Prophylaxis  Lung CA Screening    NEENA Gross [facility]    Identified Health Risks:  Answers for HPI/ROS submitted by the " patient on 12/18/2020   Annual Exam:  If you checked off any problems, how difficult have these problems made it for you to do your work, take care of things at home, or get along with other people?: Not difficult at all  PHQ9 TOTAL SCORE: 1  CHERI 7 TOTAL SCORE: 0

## 2020-12-18 ENCOUNTER — OFFICE VISIT (OUTPATIENT)
Dept: FAMILY MEDICINE | Facility: OTHER | Age: 85
End: 2020-12-18
Payer: COMMERCIAL

## 2020-12-18 VITALS
RESPIRATION RATE: 16 BRPM | WEIGHT: 179 LBS | DIASTOLIC BLOOD PRESSURE: 74 MMHG | HEART RATE: 86 BPM | SYSTOLIC BLOOD PRESSURE: 122 MMHG | BODY MASS INDEX: 25.45 KG/M2 | TEMPERATURE: 97.9 F | OXYGEN SATURATION: 97 %

## 2020-12-18 DIAGNOSIS — E11.9 TYPE 2 DIABETES MELLITUS WITHOUT COMPLICATION, WITHOUT LONG-TERM CURRENT USE OF INSULIN (H): ICD-10-CM

## 2020-12-18 DIAGNOSIS — Z12.5 SCREENING FOR PROSTATE CANCER: ICD-10-CM

## 2020-12-18 DIAGNOSIS — Z23 ENCOUNTER FOR IMMUNIZATION: ICD-10-CM

## 2020-12-18 DIAGNOSIS — N20.0 CALCULUS OF KIDNEY: ICD-10-CM

## 2020-12-18 DIAGNOSIS — F32.A ANXIETY AND DEPRESSION: ICD-10-CM

## 2020-12-18 DIAGNOSIS — M10.00 ACUTE IDIOPATHIC GOUT, UNSPECIFIED SITE: ICD-10-CM

## 2020-12-18 DIAGNOSIS — E78.5 HYPERLIPIDEMIA LDL GOAL <100: ICD-10-CM

## 2020-12-18 DIAGNOSIS — Z12.5 ENCOUNTER FOR SCREENING FOR MALIGNANT NEOPLASM OF PROSTATE: ICD-10-CM

## 2020-12-18 DIAGNOSIS — I65.21 CAROTID STENOSIS, RIGHT: ICD-10-CM

## 2020-12-18 DIAGNOSIS — Z23 NEED FOR PROPHYLACTIC VACCINATION AND INOCULATION AGAINST INFLUENZA: Primary | ICD-10-CM

## 2020-12-18 DIAGNOSIS — H91.93 HEARING DEFICIT, BILATERAL: ICD-10-CM

## 2020-12-18 DIAGNOSIS — I65.21 CAROTID STENOSIS, ASYMPTOMATIC, RIGHT: ICD-10-CM

## 2020-12-18 DIAGNOSIS — F41.9 ANXIETY AND DEPRESSION: ICD-10-CM

## 2020-12-18 LAB
ALBUMIN SERPL-MCNC: 3.8 G/DL (ref 3.4–5)
ALP SERPL-CCNC: 75 U/L (ref 40–150)
ALT SERPL W P-5'-P-CCNC: 27 U/L (ref 0–70)
ANION GAP SERPL CALCULATED.3IONS-SCNC: 3 MMOL/L (ref 3–14)
AST SERPL W P-5'-P-CCNC: 19 U/L (ref 0–45)
BILIRUB SERPL-MCNC: 0.5 MG/DL (ref 0.2–1.3)
BUN SERPL-MCNC: 24 MG/DL (ref 7–30)
CALCIUM SERPL-MCNC: 9.4 MG/DL (ref 8.5–10.1)
CHLORIDE SERPL-SCNC: 105 MMOL/L (ref 94–109)
CO2 SERPL-SCNC: 29 MMOL/L (ref 20–32)
CREAT SERPL-MCNC: 1.06 MG/DL (ref 0.66–1.25)
GFR SERPL CREATININE-BSD FRML MDRD: 65 ML/MIN/{1.73_M2}
GLUCOSE SERPL-MCNC: 153 MG/DL (ref 70–99)
HBA1C MFR BLD: 8.5 % (ref 0–5.6)
POTASSIUM SERPL-SCNC: 4.9 MMOL/L (ref 3.4–5.3)
PROT SERPL-MCNC: 7.3 G/DL (ref 6.8–8.8)
SODIUM SERPL-SCNC: 137 MMOL/L (ref 133–144)

## 2020-12-18 PROCEDURE — 80053 COMPREHEN METABOLIC PANEL: CPT | Performed by: PHYSICIAN ASSISTANT

## 2020-12-18 PROCEDURE — 99397 PER PM REEVAL EST PAT 65+ YR: CPT | Performed by: PHYSICIAN ASSISTANT

## 2020-12-18 PROCEDURE — 36415 COLL VENOUS BLD VENIPUNCTURE: CPT | Performed by: PHYSICIAN ASSISTANT

## 2020-12-18 PROCEDURE — 83036 HEMOGLOBIN GLYCOSYLATED A1C: CPT | Performed by: PHYSICIAN ASSISTANT

## 2020-12-18 PROCEDURE — 84550 ASSAY OF BLOOD/URIC ACID: CPT | Performed by: PHYSICIAN ASSISTANT

## 2020-12-18 PROCEDURE — 83721 ASSAY OF BLOOD LIPOPROTEIN: CPT | Performed by: PHYSICIAN ASSISTANT

## 2020-12-18 PROCEDURE — G0103 PSA SCREENING: HCPCS | Performed by: PHYSICIAN ASSISTANT

## 2020-12-18 RX ORDER — CLOPIDOGREL BISULFATE 75 MG/1
75 TABLET ORAL DAILY
Qty: 90 TABLET | Refills: 1 | Status: SHIPPED | OUTPATIENT
Start: 2020-12-18 | End: 2021-07-06

## 2020-12-18 RX ORDER — PAROXETINE 30 MG/1
TABLET, FILM COATED ORAL
Qty: 90 TABLET | Refills: 1 | Status: SHIPPED | OUTPATIENT
Start: 2020-12-18 | End: 2021-08-06

## 2020-12-18 RX ORDER — POTASSIUM CITRATE 10 MEQ/1
10 TABLET, EXTENDED RELEASE ORAL
Qty: 270 TABLET | Refills: 3 | Status: SHIPPED | OUTPATIENT
Start: 2020-12-18 | End: 2022-12-09

## 2020-12-18 ASSESSMENT — ENCOUNTER SYMPTOMS
NERVOUS/ANXIOUS: 0
CONSTIPATION: 0
DIZZINESS: 0
JOINT SWELLING: 0
MYALGIAS: 0
CHILLS: 0
FREQUENCY: 0
DYSURIA: 0
COUGH: 0
ABDOMINAL PAIN: 0
SHORTNESS OF BREATH: 0
PALPITATIONS: 0
HEADACHES: 0
ARTHRALGIAS: 0
HEARTBURN: 0
EYE PAIN: 0
NAUSEA: 0
FEVER: 0
PARESTHESIAS: 0
HEMATURIA: 0
SORE THROAT: 0
HEMATOCHEZIA: 0
DIARRHEA: 0
WEAKNESS: 0

## 2020-12-18 ASSESSMENT — ANXIETY QUESTIONNAIRES
3. WORRYING TOO MUCH ABOUT DIFFERENT THINGS: NOT AT ALL
7. FEELING AFRAID AS IF SOMETHING AWFUL MIGHT HAPPEN: NOT AT ALL
GAD7 TOTAL SCORE: 0
7. FEELING AFRAID AS IF SOMETHING AWFUL MIGHT HAPPEN: NOT AT ALL
2. NOT BEING ABLE TO STOP OR CONTROL WORRYING: NOT AT ALL
4. TROUBLE RELAXING: NOT AT ALL
5. BEING SO RESTLESS THAT IT IS HARD TO SIT STILL: NOT AT ALL
1. FEELING NERVOUS, ANXIOUS, OR ON EDGE: NOT AT ALL
GAD7 TOTAL SCORE: 0
GAD7 TOTAL SCORE: 0
6. BECOMING EASILY ANNOYED OR IRRITABLE: NOT AT ALL

## 2020-12-18 ASSESSMENT — PATIENT HEALTH QUESTIONNAIRE - PHQ9
10. IF YOU CHECKED OFF ANY PROBLEMS, HOW DIFFICULT HAVE THESE PROBLEMS MADE IT FOR YOU TO DO YOUR WORK, TAKE CARE OF THINGS AT HOME, OR GET ALONG WITH OTHER PEOPLE: NOT DIFFICULT AT ALL
SUM OF ALL RESPONSES TO PHQ QUESTIONS 1-9: 1
SUM OF ALL RESPONSES TO PHQ QUESTIONS 1-9: 1

## 2020-12-18 ASSESSMENT — ACTIVITIES OF DAILY LIVING (ADL): CURRENT_FUNCTION: NO ASSISTANCE NEEDED

## 2020-12-18 NOTE — LETTER
December 22, 2020      Roque Mckeon  [address] [address] [place] [id number]        Dear ,    We are writing to inform you of your test results.    Your diabetes is in less than optimal control.  I did call this morning and advised adding Actos to your medication regimen along with the other 2 medications you are already taking.  Recheck in 3 months.  May need to add insulin by injection if this does not work.    Resulted Orders   PSA, screen   Result Value Ref Range    PSA 2.72 0 - 4 ug/L      Comment:      Assay Method:  Chemiluminescence using Siemens Vista analyzer   Hemoglobin A1c   Result Value Ref Range    Hemoglobin A1C 8.5 (H) 0 - 5.6 %      Comment:      Normal <5.7% Prediabetes 5.7-6.4%  Diabetes 6.5% or higher - adopted from ADA   consensus guidelines.     Comprehensive metabolic panel   Result Value Ref Range    Sodium 137 133 - 144 mmol/L    Potassium 4.9 3.4 - 5.3 mmol/L    Chloride 105 94 - 109 mmol/L    Carbon Dioxide 29 20 - 32 mmol/L    Anion Gap 3 3 - 14 mmol/L    Glucose 153 (H) 70 - 99 mg/dL    Urea Nitrogen 24 7 - 30 mg/dL    Creatinine 1.06 0.66 - 1.25 mg/dL    GFR Estimate 65 >60 mL/min/[1.73_m2]      Comment:      Non  GFR Calc  Starting 12/18/2018, serum creatinine based estimated GFR (eGFR) will be   calculated using the Chronic Kidney Disease Epidemiology Collaboration   (CKD-EPI) equation.      GFR Estimate If Black 75 >60 mL/min/[1.73_m2]      Comment:       GFR Calc  Starting 12/18/2018, serum creatinine based estimated GFR (eGFR) will be   calculated using the Chronic Kidney Disease Epidemiology Collaboration   (CKD-EPI) equation.      Calcium 9.4 8.5 - 10.1 mg/dL    Bilirubin Total 0.5 0.2 - 1.3 mg/dL    Albumin 3.8 3.4 - 5.0 g/dL    Protein Total 7.3 6.8 - 8.8 g/dL    Alkaline Phosphatase 75 40 - 150 U/L    ALT 27 0 - 70 U/L    AST 19 0 - 45 U/L   LDL cholesterol direct   Result Value Ref Range    LDL Cholesterol Direct 79 <100 mg/dL       Comment:      Desirable:       <100 mg/dl   Uric acid   Result Value Ref Range    Uric Acid 3.5 3.5 - 7.2 mg/dL       If you have any questions or concerns, please call the clinic at the number listed above.       Sincerely,      Adam Marc PA-C

## 2020-12-19 LAB
LDLC SERPL DIRECT ASSAY-MCNC: 79 MG/DL
PSA SERPL-ACNC: 2.72 UG/L (ref 0–4)
URATE SERPL-MCNC: 3.5 MG/DL (ref 3.5–7.2)

## 2020-12-19 ASSESSMENT — ANXIETY QUESTIONNAIRES: GAD7 TOTAL SCORE: 0

## 2020-12-19 ASSESSMENT — PATIENT HEALTH QUESTIONNAIRE - PHQ9: SUM OF ALL RESPONSES TO PHQ QUESTIONS 1-9: 1

## 2020-12-21 DIAGNOSIS — E11.9 TYPE 2 DIABETES MELLITUS WITHOUT COMPLICATION, WITHOUT LONG-TERM CURRENT USE OF INSULIN (H): Primary | ICD-10-CM

## 2020-12-21 RX ORDER — PIOGLITAZONEHYDROCHLORIDE 30 MG/1
30 TABLET ORAL DAILY
Qty: 90 TABLET | Refills: 1 | Status: SHIPPED | OUTPATIENT
Start: 2020-12-21 | End: 2021-06-25

## 2020-12-21 NOTE — PROGRESS NOTES
Discussed poor control of diabetes and added Actos to his oral intake.  Follow-up in 3 months advised.  May need to move towards long-acting insulin.  Electronically signed:    Adam Carvajal PA-C

## 2021-01-07 ENCOUNTER — OFFICE VISIT (OUTPATIENT)
Dept: AUDIOLOGY | Facility: OTHER | Age: 86
End: 2021-01-07
Payer: COMMERCIAL

## 2021-01-07 DIAGNOSIS — H90.3 SENSORINEURAL HEARING LOSS, BILATERAL: Primary | ICD-10-CM

## 2021-01-07 PROCEDURE — 99207 PR NO CHARGE LOS: CPT | Performed by: AUDIOLOGIST

## 2021-01-07 PROCEDURE — 92550 TYMPANOMETRY & REFLEX THRESH: CPT | Performed by: AUDIOLOGIST

## 2021-01-07 PROCEDURE — 92557 COMPREHENSIVE HEARING TEST: CPT | Performed by: AUDIOLOGIST

## 2021-01-07 NOTE — PROGRESS NOTES
AUDIOLOGY REPORT    SUBJECTIVE:  Roque Mckeon is a 85 year old male who was seen in the Audiology Clinic at the [facility] for audiologic evaluation, referred by Adam Marc PA-C. The patient reports gradually worsening hearing, noting that his wife has expressed concern. The patient reports that he does not have ear pain, pressure, history of ear problems or ear surgery, history of loud noise exposure, or family history of hearing loss.  The patient notes difficulty with communication in a variety of listening situations. The patient was unaccompanied to today's appointment.     OBJECTIVE:  Abuse Screening:  Do you feel unsafe at home or work/school? No  Do you feel threatened by someone? No  Does anyone try to keep you from having contact with others, or doing things outside of your home? No  Physical signs of abuse present? No     Otoscopic exam indicates ears are clear of cerumen bilaterally     Pure Tone Thresholds assessed using conventional audiometry with good  reliability from 250-8000 Hz bilaterally using insert earphones and circumaural headphones     RIGHT:  mild sloping to severe sensorineural hearing loss    LEFT:    mild sloping to severe sensorineural hearing loss    Tympanogram:    RIGHT: normal eardrum mobility    LEFT:   normal eardrum mobility    Reflexes (reported by stimulus ear):  RIGHT: Ipsilateral is present at normal levels  RIGHT: Contralateral is present at normal levels  LEFT:   Ipsilateral is present at normal levels  LEFT:   Contralateral is present at normal levels    Speech Reception Threshold:    RIGHT: 45 dB HL    LEFT:   55 dB HL    Word Recognition Score:     RIGHT: 84% at 90 dB HL using NU-6 recorded word list.    LEFT:   84% at 90 dB HL using NU-6 recorded word list.      ASSESSMENT:     ICD-10-CM    1. Sensorineural hearing loss, bilateral  H90.3 Cmprhn Audiometry Thrshld Eval & Speech Recog (88742)     Tymps / Reflex   (88180)       Today s  results were discussed with the patient in detail.     PLAN:  Patient was counseled regarding hearing loss and impact on communication. Patient is a good candidate for amplification at this time.  Handout on good communication strategies and hearing aid use was given to patient. It is recommended that the patient return for a hearing aid consultation.  Please call this clinic with questions regarding these results or recommendations.      Epifanio Yo, CCC-A  [place] Licensed Audiologist #[id number]  1/7/2021

## 2021-01-21 ENCOUNTER — OFFICE VISIT (OUTPATIENT)
Dept: AUDIOLOGY | Facility: OTHER | Age: 86
End: 2021-01-21
Payer: COMMERCIAL

## 2021-01-21 DIAGNOSIS — H90.3 SENSORINEURAL HEARING LOSS, BILATERAL: Primary | ICD-10-CM

## 2021-01-21 PROCEDURE — 99207 PR NO CHARGE LOS: CPT | Performed by: AUDIOLOGIST

## 2021-01-21 NOTE — PROGRESS NOTES
AUDIOLOGY REPORT    Roque Mckeon is a 85 year old male was seen in the Audiology Clinic at  [facility] on 1/21/21 to discuss concerns with hearing and functional communication difficulties. The patient was unaccompanied to today's appointment.  Roque has been seen previously on 1/7/2021, and results revealed bilateral mild to severe sensorineural hearing loss. Roque notes difficulty with communication in a variety of listening situations.    An insurance benefit check found that the patient's [employer] insurance participates in the [employer] hearing program. This was discussed, along with the [facility] full hearing treatment plan and Access discount plan. The patient would like to look into his options before deciding what to do. He was given a copy of his audiogram after the hearing evaluation.    The patient will schedule another hearing aid consultation if he chooses to stay with [facility]. Please contact this clinic with any questions or concerns.      Epifanio Yo, CCC-A  [place] Licensed Audiologist #[id number]  1/21/2021

## 2021-03-05 ENCOUNTER — HOSPITAL ENCOUNTER (OUTPATIENT)
Dept: ULTRASOUND IMAGING | Facility: CLINIC | Age: 86
Discharge: HOME | End: 2021-03-05
Attending: SPECIALIST | Admitting: SPECIALIST
Payer: COMMERCIAL

## 2021-03-05 DIAGNOSIS — I65.21 CAROTID STENOSIS, ASYMPTOMATIC, RIGHT: ICD-10-CM

## 2021-03-05 PROCEDURE — 93880 EXTRACRANIAL BILAT STUDY: CPT

## 2021-03-08 ENCOUNTER — TELEPHONE (OUTPATIENT)
Dept: SURGERY | Facility: CLINIC | Age: 86
End: 2021-03-08

## 2021-03-08 DIAGNOSIS — I65.21 CAROTID STENOSIS, ASYMPTOMATIC, RIGHT: ICD-10-CM

## 2021-03-08 DIAGNOSIS — I65.22 LEFT CAROTID ARTERY STENOSIS: Primary | ICD-10-CM

## 2021-03-12 NOTE — TELEPHONE ENCOUNTER
Reason for Call:  Other appointment    Detailed comments: Patient calling to see what the ultrasound results are from 03/05/21. Please call him at [phone]    Phone Number Patient can be reached at: Home number on file     Best Time: any    Can we leave a detailed message on this number? YES    Call taken on 3/12/2021 at 9:35 AM by Mayte Werner

## 2021-03-12 NOTE — TELEPHONE ENCOUNTER
Spoke to patient.  No sx.  US - stenosis stable on US criteria.  Corresponds to CTA of 75%.  Does not want surgery.  Will cont plavix and serial US.

## 2021-03-12 NOTE — TELEPHONE ENCOUNTER
IMPRESSION:  1. 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 Peak systolic velocity is 365 cm/s, previously 519 cm/s.  2. Mild stenosis, estimated at less than 50% narrowing, within the  left proximal ICA based on peak systolic velocity of 110 cm/s.  3. Significant stenosis of the left external carotid artery.     Evaluation based on velocities and NASCET criteria.     ZHANNA TAYLOR MD

## 2021-03-22 ENCOUNTER — OFFICE VISIT (OUTPATIENT)
Dept: FAMILY MEDICINE | Facility: OTHER | Age: 86
End: 2021-03-22
Payer: COMMERCIAL

## 2021-03-22 VITALS
OXYGEN SATURATION: 97 % | RESPIRATION RATE: 16 BRPM | SYSTOLIC BLOOD PRESSURE: 120 MMHG | DIASTOLIC BLOOD PRESSURE: 70 MMHG | HEIGHT: 72 IN | HEART RATE: 84 BPM | BODY MASS INDEX: 25.87 KG/M2 | WEIGHT: 191 LBS | TEMPERATURE: 98.4 F

## 2021-03-22 DIAGNOSIS — F43.23 ADJUSTMENT DISORDER WITH MIXED ANXIETY AND DEPRESSED MOOD: ICD-10-CM

## 2021-03-22 DIAGNOSIS — Z23 NEED FOR PROPHYLACTIC VACCINATION AND INOCULATION AGAINST INFLUENZA: Primary | ICD-10-CM

## 2021-03-22 DIAGNOSIS — E11.9 TYPE 2 DIABETES MELLITUS WITHOUT COMPLICATION, WITHOUT LONG-TERM CURRENT USE OF INSULIN (H): ICD-10-CM

## 2021-03-22 DIAGNOSIS — E78.5 HYPERLIPIDEMIA LDL GOAL <100: ICD-10-CM

## 2021-03-22 DIAGNOSIS — Z23 NEED FOR VACCINATION: ICD-10-CM

## 2021-03-22 LAB
ALBUMIN SERPL-MCNC: 3.8 G/DL (ref 3.4–5)
ALP SERPL-CCNC: 61 U/L (ref 40–150)
ALT SERPL W P-5'-P-CCNC: 24 U/L (ref 0–70)
ANION GAP SERPL CALCULATED.3IONS-SCNC: 2 MMOL/L (ref 3–14)
AST SERPL W P-5'-P-CCNC: 16 U/L (ref 0–45)
BILIRUB SERPL-MCNC: 0.4 MG/DL (ref 0.2–1.3)
BUN SERPL-MCNC: 27 MG/DL (ref 7–30)
CALCIUM SERPL-MCNC: 8.7 MG/DL (ref 8.5–10.1)
CHLORIDE SERPL-SCNC: 107 MMOL/L (ref 94–109)
CO2 SERPL-SCNC: 29 MMOL/L (ref 20–32)
CREAT SERPL-MCNC: 1.08 MG/DL (ref 0.66–1.25)
GFR SERPL CREATININE-BSD FRML MDRD: 63 ML/MIN/{1.73_M2}
GLUCOSE SERPL-MCNC: 118 MG/DL (ref 70–99)
HBA1C MFR BLD: 7.4 % (ref 0–5.6)
POTASSIUM SERPL-SCNC: 5.6 MMOL/L (ref 3.4–5.3)
PROT SERPL-MCNC: 7.2 G/DL (ref 6.8–8.8)
SODIUM SERPL-SCNC: 138 MMOL/L (ref 133–144)

## 2021-03-22 PROCEDURE — 99214 OFFICE O/P EST MOD 30 MIN: CPT | Performed by: PHYSICIAN ASSISTANT

## 2021-03-22 PROCEDURE — 36415 COLL VENOUS BLD VENIPUNCTURE: CPT | Performed by: PHYSICIAN ASSISTANT

## 2021-03-22 PROCEDURE — 83036 HEMOGLOBIN GLYCOSYLATED A1C: CPT | Performed by: PHYSICIAN ASSISTANT

## 2021-03-22 PROCEDURE — 80053 COMPREHEN METABOLIC PANEL: CPT | Performed by: PHYSICIAN ASSISTANT

## 2021-03-22 ASSESSMENT — ANXIETY QUESTIONNAIRES
1. FEELING NERVOUS, ANXIOUS, OR ON EDGE: NOT AT ALL
GAD7 TOTAL SCORE: 0
GAD7 TOTAL SCORE: 0
5. BEING SO RESTLESS THAT IT IS HARD TO SIT STILL: NOT AT ALL
GAD7 TOTAL SCORE: 0
7. FEELING AFRAID AS IF SOMETHING AWFUL MIGHT HAPPEN: NOT AT ALL
6. BECOMING EASILY ANNOYED OR IRRITABLE: NOT AT ALL
3. WORRYING TOO MUCH ABOUT DIFFERENT THINGS: NOT AT ALL
2. NOT BEING ABLE TO STOP OR CONTROL WORRYING: NOT AT ALL
4. TROUBLE RELAXING: NOT AT ALL
7. FEELING AFRAID AS IF SOMETHING AWFUL MIGHT HAPPEN: NOT AT ALL

## 2021-03-22 ASSESSMENT — PATIENT HEALTH QUESTIONNAIRE - PHQ9
SUM OF ALL RESPONSES TO PHQ QUESTIONS 1-9: 1
10. IF YOU CHECKED OFF ANY PROBLEMS, HOW DIFFICULT HAVE THESE PROBLEMS MADE IT FOR YOU TO DO YOUR WORK, TAKE CARE OF THINGS AT HOME, OR GET ALONG WITH OTHER PEOPLE: NOT DIFFICULT AT ALL
SUM OF ALL RESPONSES TO PHQ QUESTIONS 1-9: 1

## 2021-03-22 ASSESSMENT — MIFFLIN-ST. JEOR: SCORE: 1591.43

## 2021-03-22 NOTE — LETTER
March 22, 2021      Roque Mckeon  [id number]   [facility] [address]        Dear ,    We are writing to inform you of your test results.    Much improved and quite acceptable at this point time.  Recheck in 3 months.  He is to make sure he tells us if he sees consistent lows before we change any of his medications.    Resulted Orders   Hemoglobin A1c   Result Value Ref Range    Hemoglobin A1C 7.4 (H) 0 - 5.6 %      Comment:      Normal <5.7% Prediabetes 5.7-6.4%  Diabetes 6.5% or higher - adopted from ADA   consensus guidelines.         If you have any questions or concerns, please call the clinic at the number listed above.       Sincerely,      Adam Marc PA-C           Patient is a 80 year old male with PMH of HTN, HLD who presents to the ED with complaints of perianal pain and fever. Patient states he began to feel unwell on Friday states that the pain has progressively worsened making it difficult for him to sit. He reports max temp of 100.3 and had an episode of nausea that has resolved. He denies chills, vomiting, abdominal pain, diarrhea, constipation, chest pain. He states he took Aleve around midnight but has not taken anything else for pain or fever. Pertinent labs: 17.03, glucose 142, Ca 8, alk phos 128, procal 0.5. CT abdomen/pelvis: There is a 1.7 x 3.2 x 3.1 cm left perianal abscess. No supralevator or ischiorectal fossae extension. No obvious fistula. No convincing focal bowel wall thickening. While in CT scan, abscess opened and was copious amounts of purulent drainage noted, area less red and tender. In the ED, patient received: IV vancomycin, IV zosyn, Dilaudid 2L NS. Patient admitted for sepsis 2/2 to perianal abscess.  Patient started on IV Cipro/Flagyl. WBC improved. Colorectal surgeon, Dr. Espitia, evaluated patient. Continued on IV abx, induration continued to improve.     On day of discharge, patient stable. Patient is a 80 year old male with PMH of HTN, HLD who presents to the ED with complaints of perianal pain and fever. Patient states he began to feel unwell on Friday states that the pain has progressively worsened making it difficult for him to sit. He reports max temp of 100.3 and had an episode of nausea that has resolved. He denies chills, vomiting, abdominal pain, diarrhea, constipation, chest pain. He states he took Aleve around midnight but has not taken anything else for pain or fever. Pertinent labs: 17.03, glucose 142, Ca 8, alk phos 128, procal 0.5. CT abdomen/pelvis: There is a 1.7 x 3.2 x 3.1 cm left perianal abscess. No supralevator or ischiorectal fossae extension. No obvious fistula. No convincing focal bowel wall thickening. While in CT scan, abscess opened and was copious amounts of purulent drainage noted, area less red and tender. In the ED, patient received: IV vancomycin, IV zosyn, Dilaudid 2L NS. Patient admitted for sepsis 2/2 to perianal abscess.  Patient started on IV Cipro/Flagyl. WBC improved. Colorectal surgeon, Dr. Espitia, evaluated patient. Continued on IV abx, induration continued to improve. Patient transitioned to PO Cipro/ Flagyl for 7 days with outpatient colorectal surgery follow up.    On day of discharge, patient stable for discharge with outpatient colorectal surgery and PMD follow up within 1 week.

## 2021-03-22 NOTE — PROGRESS NOTES
"    Assessment & Plan     Type 2 diabetes mellitus without complication, without long-term current use of insulin (H)  Adjust medication based on review of the results.  - Comprehensive metabolic panel  - Hemoglobin A1c    Hyperlipidemia LDL goal <100  Adjustment disorder with mixed anxiety and depressed mood  Need for vaccination  Need for prophylactic vaccination and inoculation against influenza  Declines any further intervention at this point in time and has been under good control     BMI:   Estimated body mass index is 26.27 kg/m  as calculated from the following:    Height as of this encounter: 1.816 m (5' 11.5\").    Weight as of this encounter: 86.6 kg (191 lb).   Weight management plan: Discussed healthy diet and exercise guidelines    Work on weight loss  Regular exercise  No follow-ups on file.    Adam Carvajal PA-C  [facility]AND[facility] Nev[facility] is a 85 year old who presents for the following health issues     History of Present Illness       Diabetes:   He presents for follow up of diabetes.  He is checking home blood glucose one time daily. He checks blood glucose before meals.  Blood glucose is never over 200 and sometimes over 70. He is aware of hypoglycemia symptoms including shakiness, dizziness and lethargy. He has no concerns regarding his diabetes at this time.  He is not experiencing numbness or burning in feet, excessive thirst, blurry vision, weight changes or redness, sores or blisters on feet.         He eats 2-3 servings of fruits and vegetables daily.He consumes 0 sweetened beverage(s) daily.He exercises with enough effort to increase his heart rate 30 to 60 minutes per day.  He exercises with enough effort to increase his heart rate 6 days per week.   He is taking medications regularly.     Answers for HPI/ROS submitted by the patient on 3/22/2021   Chronic problems general questions HPI Form  If you checked off any problems, how difficult have these problems " "made it for you to do your work, take care of things at home, or get along with other people?: Not difficult at all  PHQ9 TOTAL SCORE: 1  CHERI 7 TOTAL SCORE: 0      Review of Systems   Constitutional, HEENT, cardiovascular, pulmonary, GI, , musculoskeletal, neuro, skin, endocrine and psych systems are negative, except as otherwise noted.      Objective    /70 (BP Location: Right arm, Patient Position: Chair, Cuff Size: Adult Regular)   Pulse 84   Temp 98.4  F (36.9  C) (Temporal)   Resp 16   Ht 1.816 m (5' 11.5\")   Wt 86.6 kg (191 lb)   SpO2 97%   BMI 26.27 kg/m    Body mass index is 26.27 kg/m .  Physical Exam   GENERAL: healthy, alert and no distress  NECK: no adenopathy, no asymmetry, masses, or scars and thyroid normal to palpation  RESP: lungs clear to auscultation - no rales, rhonchi or wheezes  CV: regular rate and rhythm, normal S1 S2, no S3 or S4, no murmur, click or rub, no peripheral edema and peripheral pulses strong  ABDOMEN: soft, nontender, no hepatosplenomegaly, no masses and bowel sounds normal  MS: no gross musculoskeletal defects noted, no edema    Results for orders placed or performed in visit on 03/22/21 (from the past 24 hour(s))   Hemoglobin A1c   Result Value Ref Range    Hemoglobin A1C 7.4 (H) 0 - 5.6 %               "

## 2021-03-23 ENCOUNTER — TELEPHONE (OUTPATIENT)
Dept: FAMILY MEDICINE | Facility: OTHER | Age: 86
End: 2021-03-23

## 2021-03-23 ASSESSMENT — ANXIETY QUESTIONNAIRES: GAD7 TOTAL SCORE: 0

## 2021-03-23 ASSESSMENT — PATIENT HEALTH QUESTIONNAIRE - PHQ9: SUM OF ALL RESPONSES TO PHQ QUESTIONS 1-9: 1

## 2021-03-23 NOTE — TELEPHONE ENCOUNTER
----- Message from Adam Marc PA-C sent at 3/23/2021  6:30 AM CDT -----  Potassium is quite high.  Plenty of fluids advised and stop any supplements he may be taking.  Recheck in 1 week.  Please call patient.  Electronically signed:    Adam Marc PA-C

## 2021-03-23 NOTE — LETTER
[facility]  [address]  [facility] [address]  [phone]        March 25, 2021    Roque Mckeon  [address] [address] [place] [address]              We have tried to reach you by phone and have been unsuccessful. This letter is to inform you that your potassium is quite high-Adam advises plenty of fluids ane stop taking any supplements you may be taking. He would like to get this rechecked on March 30th,please let us know if you are available to complete this!    If you have any further questions or concerns let us know.    Thank You,  [facility]  [phone]

## 2021-03-23 NOTE — TELEPHONE ENCOUNTER
Attempted to reach the patient with the following information.  Left message for patient to return call to clinic.     Katelynn Fisher MA

## 2021-04-07 NOTE — PROGRESS NOTES
Assessment & Plan     Need for vaccination  Need for prophylactic vaccination and inoculation against influenza  declined    Hyperkalemia  Here for recheck of his blood work today.  More than likely his supplements is not needed in the frequency that he is taking it.  We will probably have him dilate back to 1 pill a day rather than 2 pills that he is doing right now.  Will make that decision based on today's results.  - Comprehensive metabolic panel     Work on weight loss  Regular exercise    No follow-ups on file.    NEENA [facility]     Roque Mckeon is a 85 year old male who presents to clinic today for the following health issue:    HPI       Discuss high Potasium    Review of Systems   Constitutional, HEENT, cardiovascular, pulmonary, GI, , musculoskeletal, neuro, skin, endocrine and psych systems are negative, except as otherwise noted.      Objective    /72   Pulse 85   Temp 96.8  F (36  C) (Temporal)   Resp 12   Wt 83.3 kg (183 lb 12 oz)   SpO2 98%   BMI 25.27 kg/m    Body mass index is 25.27 kg/m .  Physical Exam   GENERAL: healthy, alert and no distress  NECK: no adenopathy, no asymmetry, masses, or scars and thyroid normal to palpation  RESP: lungs clear to auscultation - no rales, rhonchi or wheezes  CV: regular rate and rhythm, normal S1 S2, no S3 or S4, no murmur, click or rub, no peripheral edema and peripheral pulses strong  ABDOMEN: soft, nontender, no hepatosplenomegaly, no masses and bowel sounds normal  MS: no gross musculoskeletal defects noted, no edema    No results found for this or any previous visit (from the past 24 hour(s)).

## 2021-04-08 ENCOUNTER — OFFICE VISIT (OUTPATIENT)
Dept: FAMILY MEDICINE | Facility: OTHER | Age: 86
End: 2021-04-08
Payer: COMMERCIAL

## 2021-04-08 VITALS
HEART RATE: 85 BPM | WEIGHT: 183.75 LBS | OXYGEN SATURATION: 98 % | DIASTOLIC BLOOD PRESSURE: 72 MMHG | SYSTOLIC BLOOD PRESSURE: 122 MMHG | BODY MASS INDEX: 25.27 KG/M2 | TEMPERATURE: 96.8 F | RESPIRATION RATE: 12 BRPM

## 2021-04-08 DIAGNOSIS — Z23 NEED FOR PROPHYLACTIC VACCINATION AND INOCULATION AGAINST INFLUENZA: ICD-10-CM

## 2021-04-08 DIAGNOSIS — Z23 NEED FOR VACCINATION: ICD-10-CM

## 2021-04-08 DIAGNOSIS — E87.5 HYPERKALEMIA: Primary | ICD-10-CM

## 2021-04-08 LAB
ALBUMIN SERPL-MCNC: 4 G/DL (ref 3.4–5)
ALP SERPL-CCNC: 69 U/L (ref 40–150)
ALT SERPL W P-5'-P-CCNC: 27 U/L (ref 0–70)
ANION GAP SERPL CALCULATED.3IONS-SCNC: 3 MMOL/L (ref 3–14)
AST SERPL W P-5'-P-CCNC: 18 U/L (ref 0–45)
BILIRUB SERPL-MCNC: 0.5 MG/DL (ref 0.2–1.3)
BUN SERPL-MCNC: 30 MG/DL (ref 7–30)
CALCIUM SERPL-MCNC: 9.3 MG/DL (ref 8.5–10.1)
CHLORIDE SERPL-SCNC: 103 MMOL/L (ref 94–109)
CO2 SERPL-SCNC: 29 MMOL/L (ref 20–32)
CREAT SERPL-MCNC: 1.14 MG/DL (ref 0.66–1.25)
GFR SERPL CREATININE-BSD FRML MDRD: 59 ML/MIN/{1.73_M2}
GLUCOSE SERPL-MCNC: 97 MG/DL (ref 70–99)
POTASSIUM SERPL-SCNC: 5 MMOL/L (ref 3.4–5.3)
PROT SERPL-MCNC: 7.7 G/DL (ref 6.8–8.8)
SODIUM SERPL-SCNC: 135 MMOL/L (ref 133–144)

## 2021-04-08 PROCEDURE — 80053 COMPREHEN METABOLIC PANEL: CPT | Performed by: PHYSICIAN ASSISTANT

## 2021-04-08 PROCEDURE — 99213 OFFICE O/P EST LOW 20 MIN: CPT | Performed by: PHYSICIAN ASSISTANT

## 2021-04-08 PROCEDURE — 36415 COLL VENOUS BLD VENIPUNCTURE: CPT | Performed by: PHYSICIAN ASSISTANT

## 2021-04-08 ASSESSMENT — PATIENT HEALTH QUESTIONNAIRE - PHQ9
5. POOR APPETITE OR OVEREATING: NOT AT ALL
SUM OF ALL RESPONSES TO PHQ QUESTIONS 1-9: 0

## 2021-04-08 ASSESSMENT — ANXIETY QUESTIONNAIRES
GAD7 TOTAL SCORE: 0
2. NOT BEING ABLE TO STOP OR CONTROL WORRYING: NOT AT ALL
3. WORRYING TOO MUCH ABOUT DIFFERENT THINGS: NOT AT ALL
1. FEELING NERVOUS, ANXIOUS, OR ON EDGE: NOT AT ALL
6. BECOMING EASILY ANNOYED OR IRRITABLE: NOT AT ALL
5. BEING SO RESTLESS THAT IT IS HARD TO SIT STILL: NOT AT ALL
7. FEELING AFRAID AS IF SOMETHING AWFUL MIGHT HAPPEN: NOT AT ALL
IF YOU CHECKED OFF ANY PROBLEMS ON THIS QUESTIONNAIRE, HOW DIFFICULT HAVE THESE PROBLEMS MADE IT FOR YOU TO DO YOUR WORK, TAKE CARE OF THINGS AT HOME, OR GET ALONG WITH OTHER PEOPLE: NOT DIFFICULT AT ALL

## 2021-04-09 ENCOUNTER — TELEPHONE (OUTPATIENT)
Dept: FAMILY MEDICINE | Facility: OTHER | Age: 86
End: 2021-04-09

## 2021-04-09 ASSESSMENT — ANXIETY QUESTIONNAIRES: GAD7 TOTAL SCORE: 0

## 2021-04-09 NOTE — LETTER
[facility]  [address]  [facility] [address]  Phone: [phone]    04/11/21    Roque Mckeon  [id number]  [facility] [address]        Dear Roque,      Excellent potassium at this point in time.  Should recheck this again in about a month along with slightly lower kidney function.  This is not of great concern.  Recheck in 1 month. Please call to get a lab appointment scheduled.      Sincerely,      Adam Carvajal PA-C

## 2021-04-09 NOTE — TELEPHONE ENCOUNTER
LM for patient to call back for results:      Please inform him and help him set up a lab appt. Recheck for in 1 month          Excellent potassium at this point in time.  Should recheck this again in about a month along with slightly lower kidney function.  This is not of great concern.  Recheck in 1 month.   Electronically signed:

## 2021-04-11 NOTE — TELEPHONE ENCOUNTER
LM for patient to return phone call to clinic about message below.  Letter sent.  Indiana Krishna CMA ([facility])

## 2021-06-25 ENCOUNTER — OFFICE VISIT (OUTPATIENT)
Dept: FAMILY MEDICINE | Facility: OTHER | Age: 86
End: 2021-06-25
Payer: COMMERCIAL

## 2021-06-25 VITALS
BODY MASS INDEX: 25.44 KG/M2 | SYSTOLIC BLOOD PRESSURE: 116 MMHG | TEMPERATURE: 97.5 F | WEIGHT: 185 LBS | HEART RATE: 88 BPM | RESPIRATION RATE: 16 BRPM | DIASTOLIC BLOOD PRESSURE: 72 MMHG | OXYGEN SATURATION: 97 %

## 2021-06-25 DIAGNOSIS — E11.9 TYPE 2 DIABETES MELLITUS WITHOUT COMPLICATION, WITHOUT LONG-TERM CURRENT USE OF INSULIN (H): ICD-10-CM

## 2021-06-25 DIAGNOSIS — F32.5 MAJOR DEPRESSION IN COMPLETE REMISSION (H): ICD-10-CM

## 2021-06-25 DIAGNOSIS — M10.00 ACUTE IDIOPATHIC GOUT, UNSPECIFIED SITE: ICD-10-CM

## 2021-06-25 DIAGNOSIS — Z13.220 SCREENING FOR HYPERLIPIDEMIA: Primary | ICD-10-CM

## 2021-06-25 DIAGNOSIS — E78.5 HYPERLIPIDEMIA LDL GOAL <100: ICD-10-CM

## 2021-06-25 DIAGNOSIS — F41.9 ANXIETY: ICD-10-CM

## 2021-06-25 DIAGNOSIS — E87.5 HYPERKALEMIA: ICD-10-CM

## 2021-06-25 DIAGNOSIS — Z23 NEED FOR VACCINATION: ICD-10-CM

## 2021-06-25 LAB
ALBUMIN SERPL-MCNC: 4 G/DL (ref 3.4–5)
ALP SERPL-CCNC: 62 U/L (ref 40–150)
ALT SERPL W P-5'-P-CCNC: 27 U/L (ref 0–70)
ANION GAP SERPL CALCULATED.3IONS-SCNC: 2 MMOL/L (ref 3–14)
AST SERPL W P-5'-P-CCNC: 21 U/L (ref 0–45)
BILIRUB SERPL-MCNC: 0.4 MG/DL (ref 0.2–1.3)
BUN SERPL-MCNC: 34 MG/DL (ref 7–30)
CALCIUM SERPL-MCNC: 9.3 MG/DL (ref 8.5–10.1)
CHLORIDE SERPL-SCNC: 110 MMOL/L (ref 94–109)
CO2 SERPL-SCNC: 28 MMOL/L (ref 20–32)
CREAT SERPL-MCNC: 1.47 MG/DL (ref 0.66–1.25)
CREAT UR-MCNC: 180 MG/DL
GFR SERPL CREATININE-BSD FRML MDRD: 43 ML/MIN/{1.73_M2}
GLUCOSE SERPL-MCNC: 152 MG/DL (ref 70–99)
HBA1C MFR BLD: 7.1 % (ref 0–5.6)
LDLC SERPL DIRECT ASSAY-MCNC: 60 MG/DL
MICROALBUMIN UR-MCNC: 89 MG/L
MICROALBUMIN/CREAT UR: 49.67 MG/G CR (ref 0–17)
POTASSIUM SERPL-SCNC: 5.4 MMOL/L (ref 3.4–5.3)
PROT SERPL-MCNC: 7.6 G/DL (ref 6.8–8.8)
SODIUM SERPL-SCNC: 140 MMOL/L (ref 133–144)
URATE SERPL-MCNC: 4 MG/DL (ref 3.5–7.2)

## 2021-06-25 PROCEDURE — 80053 COMPREHEN METABOLIC PANEL: CPT | Performed by: PHYSICIAN ASSISTANT

## 2021-06-25 PROCEDURE — 36415 COLL VENOUS BLD VENIPUNCTURE: CPT | Performed by: PHYSICIAN ASSISTANT

## 2021-06-25 PROCEDURE — 99214 OFFICE O/P EST MOD 30 MIN: CPT | Performed by: PHYSICIAN ASSISTANT

## 2021-06-25 PROCEDURE — 82043 UR ALBUMIN QUANTITATIVE: CPT | Performed by: PHYSICIAN ASSISTANT

## 2021-06-25 PROCEDURE — 84550 ASSAY OF BLOOD/URIC ACID: CPT | Performed by: PHYSICIAN ASSISTANT

## 2021-06-25 PROCEDURE — 83721 ASSAY OF BLOOD LIPOPROTEIN: CPT | Performed by: PHYSICIAN ASSISTANT

## 2021-06-25 PROCEDURE — 83036 HEMOGLOBIN GLYCOSYLATED A1C: CPT | Performed by: PHYSICIAN ASSISTANT

## 2021-06-25 RX ORDER — PIOGLITAZONEHYDROCHLORIDE 30 MG/1
30 TABLET ORAL DAILY
Qty: 90 TABLET | Refills: 1 | Status: SHIPPED | OUTPATIENT
Start: 2021-06-25

## 2021-06-25 NOTE — PROGRESS NOTES
Assessment & Plan     Type 2 diabetes mellitus without complication, without long-term current use of insulin (H)  Due for labs today.  Will need to adjust medication based on results.  Follow-up in 6 months most likely.  - Albumin Random Urine Quantitative with Creat Ratio  - pioglitazone (ACTOS) 30 MG tablet; Take 1 tablet (30 mg) by mouth daily  - Hemoglobin A1c  - LDL cholesterol direct  - Uric acid  - Comprehensive metabolic panel    Major depression in complete remission (H)  Anxiety  Doing very well no new concerns follow-up with provider of record as needed.    Hyperlipidemia LDL goal <100  - Uric acid  - Comprehensive metabolic panel    Hyperkalemia  Historically noted rechecking today.  No new concerns.  Follow-up as needed.  - Uric acid  - Comprehensive metabolic panel    Acute idiopathic gout, unspecified site  Historically noted no new concerns rechecking uric acid level today.  Follow-up as needed.  - Uric acid  - Comprehensive metabolic panel    Need for vaccination  Declines any further intervention today.     Blood sugar testing frequency justification:  Patient modifying lifestyle changes (diet, exercise) with blood sugars      No follow-ups on file.    Adam Carvajal PA-C  [facility]     Roque Mckeon is a 85 year old male who presents to clinic today for the following health issues accompanied by his     History of Present Illness       Diabetes:   He presents for follow up of diabetes.  He is checking home blood glucose two times daily. He checks blood glucose before meals.  Blood glucose is sometimes over 200 and never under 70. When his blood glucose is low, the patient is asymptomatic for confusion, blurred vision, lethargy and reports not feeling dizzy, shaky, or weak.  He has no concerns regarding his diabetes at this time.  He is not experiencing numbness or burning in feet, excessive thirst, blurry vision, weight changes or redness, sores or  blisters on feet.         He eats 2-3 servings of fruits and vegetables daily.He consumes 0 sweetened beverage(s) daily.He exercises with enough effort to increase his heart rate 10 to 19 minutes per day.  He exercises with enough effort to increase his heart rate 3 or less days per week.   He is taking medications regularly.         Review of Systems   Constitutional, HEENT, cardiovascular, pulmonary, GI, , musculoskeletal, neuro, skin, endocrine and psych systems are negative, except as otherwise noted.      Objective    /72   Pulse 88   Temp 97.5  F (36.4  C) (Temporal)   Resp 16   Wt 83.9 kg (185 lb)   SpO2 97%   BMI 25.44 kg/m    Body mass index is 25.44 kg/m .  Physical Exam   GENERAL: healthy, alert and no distress  NECK: no adenopathy, no asymmetry, masses, or scars and thyroid normal to palpation  RESP: lungs clear to auscultation - no rales, rhonchi or wheezes  CV: regular rate and rhythm, normal S1 S2, no S3 or S4, no murmur, click or rub, no peripheral edema and peripheral pulses strong  ABDOMEN: soft, nontender, no hepatosplenomegaly, no masses and bowel sounds normal  MS: no gross musculoskeletal defects noted, no edema  SKIN: no suspicious lesions or rashes to exposed visible skin today he has many small seborrheic keratosis lesions.  NEURO: Normal strength and tone, mentation intact and speech normal  PSYCH: mentation appears normal, affect normal/bright    No results found for this or any previous visit (from the past 24 hour(s)).  Labs pending at time of dictation.

## 2021-06-28 ENCOUNTER — TELEPHONE (OUTPATIENT)
Dept: FAMILY MEDICINE | Facility: OTHER | Age: 86
End: 2021-06-28

## 2021-06-28 NOTE — TELEPHONE ENCOUNTER
----- Message from Adam Marc PA-C sent at 6/28/2021  6:10 AM CDT -----  Stable control of his diabetes.  His microalbumin in his urine has gone up a little bit and we need monitor that carefully.  It is still not as high as it was 5 years ago.  We should recheck this in about 3 months.  Of concern today is elevation in potassium much like 3 months ago.  We note that his kidney function has decreased as well and we will need to monitor this carefully.  I would advise that he increase his intake of clear fluids and recheck this in 2 to 3 weeks.  I would have him decrease his potassium supplement to twice a day instead of 3 times a day.  Electronically signed:    Adam Marc PA-C

## 2021-06-29 NOTE — TELEPHONE ENCOUNTER
Patient was informed of the message from Adam. He has no additional questions at this time. He will call back to schedule his lab appointment in 2 to 3 weeks.     Navid Tan RN, BSN  [place]  June 29, 2021

## 2021-07-06 DIAGNOSIS — I65.21 CAROTID STENOSIS, ASYMPTOMATIC, RIGHT: ICD-10-CM

## 2021-07-06 RX ORDER — CLOPIDOGREL BISULFATE 75 MG/1
TABLET ORAL
Qty: 90 TABLET | Refills: 1 | Status: SHIPPED | OUTPATIENT
Start: 2021-07-06 | End: 2024-07-15

## 2021-08-06 DIAGNOSIS — F32.A ANXIETY AND DEPRESSION: ICD-10-CM

## 2021-08-06 DIAGNOSIS — F41.9 ANXIETY AND DEPRESSION: ICD-10-CM

## 2021-08-06 RX ORDER — PAROXETINE 30 MG/1
TABLET, FILM COATED ORAL
Qty: 90 TABLET | Refills: 1 | Status: SHIPPED | OUTPATIENT
Start: 2021-08-06 | End: 2022-02-07

## 2021-09-03 ENCOUNTER — VIRTUAL VISIT (OUTPATIENT)
Dept: FAMILY MEDICINE | Facility: CLINIC | Age: 86
End: 2021-09-03
Payer: COMMERCIAL

## 2021-09-03 ENCOUNTER — ALLIED HEALTH/NURSE VISIT (OUTPATIENT)
Dept: FAMILY MEDICINE | Facility: CLINIC | Age: 86
End: 2021-09-03
Payer: COMMERCIAL

## 2021-09-03 DIAGNOSIS — R43.2 LOSS OF TASTE: Primary | ICD-10-CM

## 2021-09-03 DIAGNOSIS — R43.2 LOSS OF TASTE: ICD-10-CM

## 2021-09-03 DIAGNOSIS — R05.9 COUGH: ICD-10-CM

## 2021-09-03 PROCEDURE — U0003 INFECTIOUS AGENT DETECTION BY NUCLEIC ACID (DNA OR RNA); SEVERE ACUTE RESPIRATORY SYNDROME CORONAVIRUS 2 (SARS-COV-2) (CORONAVIRUS DISEASE [COVID-19]), AMPLIFIED PROBE TECHNIQUE, MAKING USE OF HIGH THROUGHPUT TECHNOLOGIES AS DESCRIBED BY CMS-2020-01-R: HCPCS

## 2021-09-03 PROCEDURE — U0005 INFEC AGEN DETEC AMPLI PROBE: HCPCS

## 2021-09-03 PROCEDURE — 99213 OFFICE O/P EST LOW 20 MIN: CPT | Mod: TEL | Performed by: PHYSICIAN ASSISTANT

## 2021-09-03 PROCEDURE — 99207 PR NO CHARGE NURSE ONLY: CPT

## 2021-09-03 NOTE — PATIENT INSTRUCTIONS
Quarantine is for those who have had a close contact to someone who is COVID positive. A close contact is defined as being within 6 feet for 15 minutes or longer. We recommend you stay home and away from others for 14 days to monitor for symptoms. If you develop symptoms, enter isolation guidelines and be tested for COVID-19. If you are living with someone who is COVID positive and sharing the same living space, you should quarantine beginning now but the 14 day timeline begins after that person's isolation ends. Those who have been vaccinated do not need to quarantine but should wear a mask while in public for 14 days after the last contact with the covid positive person.    Isolation is for those who have symptoms or test positive for COVID-19. You should remain home and away from others for 10 days after your symptoms start. You may return to activities after 10 days as long as you have been fever free for 24 hours and have had an improvement in your symptoms. If you are tested and your test comes back negative, you may return to activities 24 hours after you have been fever free without medication.      Your symptoms show that you may have coronavirus (COVID-19). This illness can cause fever, cough and trouble  breathing. Many people get a mild case and get better on their own. Some people can get very sick.    What should I do?    Starting now: Stay home and away from others (self-isolate) until:    You've had no fever--and no medicine that reduces fever--for 3 full days (72 hours). And     Your other symptoms have gotten better. For example, your cough or breathing has improved. And     At least 10 days have passed since your symptoms started.    During this time, don t leave the house except for testing or medical care.    Stay in your own room, even for meals. Use your own bathroom if you can.    Stay away from others in your home. No hugging, kissing or shaking hands. No visitors.    Don t go to work,  school or anywhere else.    Clean  high touch  surfaces often (doorknobs, counters, handles, etc.). Use a household cleaning  spray or wipes. You ll find a full list of  on the EPA website: www.epa.gov/pesticideregistration/  list-n-disinfectants-use-against-sars-cov-2.    Cover your mouth and nose with a mask, tissue or washcloth to avoid spreading germs.    Wash your hands and face often. Use soap and water.    Caregivers in these groups are at risk for severe illness due to COVID-19:  o People 65 years and older  o People who live in a nursing home or long-term care facility  o People with chronic disease (lung, heart, cancer, diabetes, kidney, liver, immunologic)  o People who have a weakened immune system, including those who:    Are in cancer treatment    Take medicine that weakens the immune system, such as corticosteroids    Had a bone marrow or organ transplant    Have an immune deficiency    Have poorly controlled HIV or AIDS    Are obese (body mass index of 40 or higher)    Smoke regularly  o Caregivers should wear gloves while washing dishes, handling laundry and cleaning  bedrooms and bathrooms.  o Use caution when washing and drying laundry: Don t shake dirty laundry, and use the  warmest water setting that you can.  o For more tips, go to www.cdc.gov/coronavirus/2019-ncov/downloads/10Things.pdf.    How can I take care of myself?  1. Get lots of rest. Drink extra fluids (unless a doctor has told you not to).  2. Take Tylenol (acetaminophen) for fever or pain. If you have liver or kidney problems, ask your family  doctor if it's okay to take Tylenol.  Adults can take either:    650 mg (two 325 mg pills) every 4 to 6 hours, or     1,000 mg (two 500 mg pills) every 8 hours as needed.    Note: Don't take more than 3,000 mg in one day. Acetaminophen is found in many medicines  (both prescribed and over-the-counter medicines). Read all labels to be sure you don t take too  much.  For children, check  the Tylenol bottle for the right dose. The dose is based on the child's age or weight.  3. If you have other health problems (like cancer, heart failure, an organ transplant or severe kidney  disease): Call your specialty clinic if you don't feel better in the next 2 days.  4. Know when to call 911. Emergency warning signs include:    Trouble breathing or shortness of breath    Pain or pressure in the chest that doesn t go away    Feeling confused like you haven t felt before, or not being able to wake up    Bluish-colored lips or face    Where can I get more information?    [facility] - About COVID-19: [email or web address]    CDC - What to Do If You re Sick: www.cdc.gov/coronavirus/2019-ncov/about/steps-when-sick.html    CDC - Ending Home Isolation: www.cdc.gov/coronavirus/2019-ncov/hcp/disposition-in-homepatients.  html    CDC - Caring for Someone: www.cdc.gov/coronavirus/2019-ncov/if-you-are-sick/care-for-someone.html    [facility] - Interim Guidance for Hospital Discharge to Home:  www.health.[facility].mn.us/diseases/coronavirus/hcp/hospdischarge.pdf    [facility] clinical trials (COVID-19 research studies): clinicalaffairs.[facility]-clinicaltrials    Below are the COVID-19 hotlines at the [place] Department of Health ([facility]). Interpreters are  available.  o For health questions: Call 744-037-2686 or 1-793.241.8724 (7 a.m. to 7 p.m.)  o For questions about schools and childcare: Call 441-072-7448 or 1-835.410.3300 (7 a.m. to 7 p.m.)

## 2021-09-03 NOTE — PROGRESS NOTES
Patient was swabbed today for covid and tolerated it well. Harini Ham CMA ([facility])

## 2021-09-03 NOTE — PROGRESS NOTES
Roque is a 85 year old who is being evaluated via a billable telephone visit.      What phone number would you like to be contacted at? [phone]  How would you like to obtain your AVS? Mail a copy    Assessment & Plan     Loss of taste  - Symptomatic COVID-19 Virus (Coronavirus) by PCR Nose; Future    Cough  - Symptomatic COVID-19 Virus (Coronavirus) by PCR Nose; Future    Covid test will be collected later today.  He will call the clinic to get his Covid result approximately 24 hours.  We discussed symptomatic measures and quarantine isolation guidelines.    20 minutes spent on the date of the encounter doing chart review, patient visit and documentation    Return in about 2 weeks (around 9/17/2021) for Recheck with primary care provider if not improved.    NEENA [facility]   Roque is a 85 year old who presents for the following health issues     HPI       Concern for COVID-19  About how many days ago did these symptoms start? Since Tuesday  Is this your first visit for this illness? Yes  In the 14 days before your symptoms started, have you had close contact with someone with COVID-19 (Coronavirus)? No  Do you have a fever or chills? No fever but does have chills  Are you having new or worsening difficulty breathing? No  Do you have new or worsening cough? Yes, it's a dry cough.   Have you had any new or unexplained body aches? YES    Have you experienced any of the following NEW symptoms?    Headache: No    Sore throat: YES, 1 day    Loss of taste or smell: YES    Chest pain: No    Diarrhea: YES    Rash: No  What treatments have you tried? Ibuprofen for 3 days  Who do you live with? Wife  Are you, or a household member, a healthcare worker or a ? No  Do you live in a nursing home, group home, or shelter? No  Do you have a way to get food/medications if quarantined? Yes, I have a friend or family member who can help me.        Roque presents  via telephone for evaluation of possible Covid.  He states his symptoms first began last Tuesday, 4 days ago, with a cough.  He then developed chills, sneezing and lost his sense of taste.  He states his cough is dry and not associated with any shortness of breath.  [facility] in February 2021.  He states that overall he is not feeling too badly but he would like to be tested for Covid to make sure he is not spreading this to anyone else.    Review of Systems   ROS negative except as noted above      Objective           Vitals:  No vitals were obtained today due to virtual visit.    Physical Exam   healthy, alert and no distress  PSYCH: Alert and oriented times 3; coherent speech, normal   rate and volume, able to articulate logical thoughts, able   to abstract reason, no tangential thoughts, no hallucinations   or delusions  His affect is normal and pleasant  RESP: No cough, no audible wheezing, able to talk in full sentences  Remainder of exam unable to be completed due to telephone visits    No results found for any visits on 09/03/21.        Phone call duration: 9 minutes

## 2021-09-04 ENCOUNTER — TELEPHONE (OUTPATIENT)
Dept: FAMILY MEDICINE | Facility: CLINIC | Age: 86
End: 2021-09-04

## 2021-09-04 ENCOUNTER — NURSE TRIAGE (OUTPATIENT)
Dept: NURSING | Facility: CLINIC | Age: 86
End: 2021-09-04

## 2021-09-04 LAB — SARS-COV-2 RNA RESP QL NAA+PROBE: POSITIVE

## 2021-09-04 NOTE — TELEPHONE ENCOUNTER
Coronavirus (COVID-19) Notification     Reason for call  Patient requesting results     Lab Result    Lab test 2019-nCoV rRt-PCR in process        RN Recommendations/Instructions per [facility]  Continue quarantee and following instructions until you receive the results     Please Contact your PCP clinic or return to the Emergency department if your:    Symptoms worsen or other concerning symptom's.     Patient informed that if test for COVID19 is POSITIVE,  you will receive a call typically within 48 hours from the test date (date lab collected).  If NEGATIVE result, you will receive a letter in the mail or [employer].      Dorita Perez RN    Reason for Disposition    Caller requesting lab results    Protocols used: PCP CALL - NO TRIAGE-A-

## 2021-09-05 NOTE — TELEPHONE ENCOUNTER
Coronavirus (COVID-19) Notification    Reason for call  Notify of POSITIVE  COVID-19 lab result, assess symptoms,  review [facility] recommendations    Lab Result   Lab test for 2019-nCoV rRt-PCR or SARS-COV-2 PCR  Oropharyngeal AND/OR nasopharyngeal swabs were POSITIVE for 2019-nCoV RNA [OR] SARS-COV-2 RNA (COVID-19) RNA     We have been unable to reach Patient by phone at this time to notify of their Positive COVID-19 result.  Left voicemail message requesting a call back to [phone] [facility] for results.        POSITIVE COVID-19 Letter sent.    Isabelle Hudson LPN

## 2021-09-05 NOTE — TELEPHONE ENCOUNTER
"-Coronavirus (COVID-19) Notification    Caller Name (Patient, parent, daughter/son, grandparent, etc)  karina Nevarez  Reason for call  Notify of Positive Coronavirus (COVID-19) lab results, assess symptoms,  review  [facility] recommendations    Lab Result    Lab test:  2019-nCoV rRt-PCR or SARS-CoV-2 PCR    Oropharyngeal AND/OR nasopharyngeal swabs is POSITIVE for 2019-nCoV RNA/SARS-COV-2 PCR (COVID-19 virus)    RN Recommendations/Instructions per [facility] Coronavirus COVID-19 recommendations    Brief introduction script  Introduce self then review script:  \"I am calling on behalf of [employer].  We were notified that your Coronavirus test (COVID-19) for was POSITIVE for the virus.  I have some information to relay to you but first I wanted to mention that the [place] Dept of Health will be contacting you shortly [it's possible MD already called Patient] to talk to you more about how you are feeling and other people you have had contact with who might now also have the virus.  Also,  [facility] is Partnering with the [facility] for Covid-19 research, you may be contacted directly by research staff.\"    Assessment (Inquire about Patient's current symptoms)   Assessment   Current Symptoms at time of phone call: (if no symptoms, document No symptoms] Chills, body aches, cough, sneezing   Symptoms onset (if applicable) 8/31/2021     If at time of call, Patients symptoms hare worsened, the Patient should contact 911 or have someone drive them to Emergency Dept promptly:      If Patient calling 911, inform 911 personal that you have tested positive for the Coronavirus (COVID-19).  Place mask on and await 911 to arrive.    If Emergency Dept, If possible, please have another adult drive you to the Emergency Dept but you need to wear mask when in contact with other people.      Monoclonal Antibody Administration    You may be eligible to receive a new treatment with a monoclonal antibody for " "preventing hospitalization in patients at high risk for complications from COVID-19.   This medication is still experimental and available on a limited basis; it is given through an IV and must be given at an infusion center. Please note that not all people who are eligible will receive the medication since it is in limited supply.       Are you interested in being considered for this medication?  No.   Does the patient fit the criteria: No    If patient qualifies based on above criteria:  \"You will be contacted if you are selected to receive this treatment in the next 1-2 business days.   This is time sensitive and if you are not selected in the next 1-2 business days, you will not receive the medication.  If you do not receive a call to schedule, you have not been selected.\"      Review information with Patient    Your result was positive. This means you have COVID-19 (coronavirus).  We have sent you a letter that reviews the information that I'll be reviewing with you now.    How can I protect others?    If you have symptoms: stay home and away from others (self-isolate) until:    You've had no fever--and no medicine that reduces fever--for 1 full day (24 hours). And       Your other symptoms have gotten better. For example, your cough or breathing has improved. And     At least 10 days have passed since your symptoms started. (If you've been told by a doctor that you have a weak immune system, wait 20 days.)     If you don't have symptoms: Stay home and away from others (self-isolate) until at least 10 days have passed since your first positive COVID-19 test. (Date test collected)    During this time:    Stay in your own room, including for meals. Use your own bathroom if you can.    Stay away from others in your home. No hugging, kissing or shaking hands. No visitors.     Don't go to work, school or anywhere else.     Clean  high touch  surfaces often (doorknobs, counters, handles, etc.). Use a household " cleaning spray or wipes. You'll find a full list on the EPA website at www.epa.gov/pesticide-registration/list-n-disinfectants-use-against-sars-cov-2.     Cover your mouth and nose with a mask, tissue or other face covering to avoid spreading germs.    Wash your hands and face often with soap and water.    Make a list of people you have been in close contact with recently, even if either of you wore a face covering.   ; Start your list from 2 days before you became ill or had a positive test.  ; Include anyone that was within 6 feet of you for a cumulative total of 15 minutes or more in 24 hours. (Example: if you sat next to Boris for 5 minutes in the morning and 10 minutes in the afternoon, then you were in close contact for 15 minutes total that day. Boris would be added to your list.)    A public health worker will call or text you. It is important that you answer. They will ask you questions about possible exposures to COVID-19, such as people you have been in direct contact with and places you have visited.    Tell the people on your list that you have COVID-19; they should stay away from others for 14 days starting from the last time they were in contact with you (unless you are told something different from a public health worker).     Caregivers in these groups are at risk for severe illness due to COVID-19:  o People 65 years and older  o People who live in a nursing home or long-term care facility  o People with chronic disease (lung, heart, cancer, diabetes, kidney, liver, immunologic)  o People who have a weakened immune system, including those who:  - Are in cancer treatment  - Take medicine that weakens the immune system, such as corticosteroids  - Had a bone marrow or organ transplant  - Have an immune deficiency  - Have poorly controlled HIV or AIDS  - Are obese (body mass index of 40 or higher)  - Smoke regularly    Caregivers should wear gloves while washing dishes, handling laundry and cleaning  bedrooms and bathrooms.    Wash and dry laundry with special caution. Don't shake dirty laundry, and use the warmest water setting you can.    If you have a weakened immune system, ask your doctor about other actions you should take.    For more tips, go to www.cdc.gov/coronavirus/2019-ncov/downloads/10Things.pdf.    You should not go back to work until you meet the guidelines above for ending your home isolation. You don't need to be retested for COVID-19 before going back to work--studies show that you won't spread the virus if it's been at least 10 days since your symptoms started (or 20 days, if you have a weak immune system).    Employers: This document serves as formal notice of your employee's medical guidelines for going back to work. They must meet the above guidelines before going back to work in person.    How can I take care of myself?    1. Get lots of rest. Drink extra fluids (unless a doctor has told you not to).    2. Take Tylenol (acetaminophen) for fever or pain. If you have liver or kidney problems, ask your family doctor if it's okay to take Tylenol.     Take either:     650 mg (two 325 mg pills) every 4 to 6 hours, or     1,000 mg (two 500 mg pills) every 8 hours as needed.     Note: Don't take more than 3,000 mg in one day. Acetaminophen is found in many medicines (both prescribed and over-the-counter medicines). Read all labels to be sure you don't take too much.    For children, check the Tylenol bottle for the right dose (based on their age or weight).    3. If you have other health problems (like cancer, heart failure, an organ transplant or severe kidney disease): Call your specialty clinic if you don't feel better in the next 2 days.    4. Know when to call 911: Emergency warning signs include:    Trouble breathing or shortness of breath    Pain or pressure in the chest that doesn't go away    Feeling confused like you haven't felt before, or not being able to wake up    Bluish-colored  lips or face    5. Sign up for [employer]. We know it's scary to hear that you have COVID-19. We want to track your symptoms to make sure you're okay over the next 2 weeks. Please look for an email from [employer]--this is a free, online program that we'll use to keep in touch. To sign up, follow the link in the email. Learn more at [email or web address].    Where can I get more information?    [facility]: [email or web address]    Coronavirus Basics: www.health.[facility].[place]./diseases/coronavirus/basics.html    What to Do If You're Sick: www.cdc.gov/coronavirus/2019-ncov/about/steps-when-sick.html    Ending Home Isolation: www.cdc.gov/coronavirus/2019-ncov/hcp/disposition-in-home-patients.html     Caring for Someone with COVID-19: www.cdc.gov/coronavirus/2019-ncov/if-you-are-sick/care-for-someone.html     [facility] clinical trials (COVID-19 research studies): clinicalaffairs.[facility]-clinical-trials     A Positive COVID-19 letter will be sent via [employer] or the mail. (Exception, no letters sent to Presurgerical/Preprocedure Patients)    Isabelle Hudson LPN

## 2021-11-15 ENCOUNTER — OFFICE VISIT (OUTPATIENT)
Dept: FAMILY MEDICINE | Facility: OTHER | Age: 86
End: 2021-11-15
Payer: COMMERCIAL

## 2021-11-15 VITALS
SYSTOLIC BLOOD PRESSURE: 122 MMHG | WEIGHT: 191 LBS | OXYGEN SATURATION: 93 % | HEIGHT: 71 IN | DIASTOLIC BLOOD PRESSURE: 62 MMHG | BODY MASS INDEX: 26.74 KG/M2 | TEMPERATURE: 96.8 F | HEART RATE: 71 BPM | RESPIRATION RATE: 18 BRPM

## 2021-11-15 DIAGNOSIS — N18.31 CHRONIC KIDNEY DISEASE, STAGE 3A (H): ICD-10-CM

## 2021-11-15 DIAGNOSIS — R06.09 DOE (DYSPNEA ON EXERTION): ICD-10-CM

## 2021-11-15 DIAGNOSIS — E78.5 HYPERLIPIDEMIA LDL GOAL <100: ICD-10-CM

## 2021-11-15 DIAGNOSIS — E11.9 TYPE 2 DIABETES MELLITUS WITHOUT COMPLICATION, WITHOUT LONG-TERM CURRENT USE OF INSULIN (H): ICD-10-CM

## 2021-11-15 DIAGNOSIS — F43.23 ADJUSTMENT DISORDER WITH MIXED ANXIETY AND DEPRESSED MOOD: ICD-10-CM

## 2021-11-15 DIAGNOSIS — Z86.16 PERSONAL HISTORY OF COVID-19: ICD-10-CM

## 2021-11-15 DIAGNOSIS — Z13.220 SCREENING FOR HYPERLIPIDEMIA: Primary | ICD-10-CM

## 2021-11-15 LAB
ALBUMIN SERPL-MCNC: 3.6 G/DL (ref 3.4–5)
ALP SERPL-CCNC: 66 U/L (ref 40–150)
ALT SERPL W P-5'-P-CCNC: 29 U/L (ref 0–70)
ANION GAP SERPL CALCULATED.3IONS-SCNC: 5 MMOL/L (ref 3–14)
AST SERPL W P-5'-P-CCNC: 28 U/L (ref 0–45)
BILIRUB SERPL-MCNC: 0.5 MG/DL (ref 0.2–1.3)
BUN SERPL-MCNC: 31 MG/DL (ref 7–30)
CALCIUM SERPL-MCNC: 8.5 MG/DL (ref 8.5–10.1)
CHLORIDE BLD-SCNC: 105 MMOL/L (ref 94–109)
CHOLEST SERPL-MCNC: 103 MG/DL
CO2 SERPL-SCNC: 27 MMOL/L (ref 20–32)
CREAT SERPL-MCNC: 1.16 MG/DL (ref 0.66–1.25)
D DIMER PPP FEU-MCNC: 0.76 UG/ML FEU (ref 0–0.5)
FASTING STATUS PATIENT QL REPORTED: NO
GFR SERPL CREATININE-BSD FRML MDRD: 58 ML/MIN/1.73M2
GLUCOSE BLD-MCNC: 153 MG/DL (ref 70–99)
HBA1C MFR BLD: 7.4 % (ref 0–5.6)
HDLC SERPL-MCNC: 56 MG/DL
LDLC SERPL CALC-MCNC: 22 MG/DL
NONHDLC SERPL-MCNC: 47 MG/DL
NT-PROBNP SERPL-MCNC: 4944 PG/ML (ref 0–450)
POTASSIUM BLD-SCNC: 5.1 MMOL/L (ref 3.4–5.3)
PROT SERPL-MCNC: 7.4 G/DL (ref 6.8–8.8)
SODIUM SERPL-SCNC: 137 MMOL/L (ref 133–144)
TRIGL SERPL-MCNC: 127 MG/DL

## 2021-11-15 PROCEDURE — 83036 HEMOGLOBIN GLYCOSYLATED A1C: CPT | Performed by: PHYSICIAN ASSISTANT

## 2021-11-15 PROCEDURE — 80061 LIPID PANEL: CPT | Performed by: PHYSICIAN ASSISTANT

## 2021-11-15 PROCEDURE — 83880 ASSAY OF NATRIURETIC PEPTIDE: CPT | Performed by: PHYSICIAN ASSISTANT

## 2021-11-15 PROCEDURE — 85379 FIBRIN DEGRADATION QUANT: CPT | Performed by: PHYSICIAN ASSISTANT

## 2021-11-15 PROCEDURE — 99207 PR FOOT EXAM NO CHARGE: CPT | Mod: 25 | Performed by: PHYSICIAN ASSISTANT

## 2021-11-15 PROCEDURE — 36415 COLL VENOUS BLD VENIPUNCTURE: CPT | Performed by: PHYSICIAN ASSISTANT

## 2021-11-15 PROCEDURE — 80053 COMPREHEN METABOLIC PANEL: CPT | Performed by: PHYSICIAN ASSISTANT

## 2021-11-15 PROCEDURE — 99214 OFFICE O/P EST MOD 30 MIN: CPT | Performed by: PHYSICIAN ASSISTANT

## 2021-11-15 RX ORDER — GLIPIZIDE 10 MG/1
TABLET ORAL
COMMUNITY
Start: 2021-06-29

## 2021-11-15 RX ORDER — ATORVASTATIN CALCIUM 80 MG/1
TABLET, FILM COATED ORAL
COMMUNITY
Start: 2021-06-29

## 2021-11-15 RX ORDER — LANCETS
EACH MISCELLANEOUS
Qty: 100 EACH | Refills: 3 | Status: SHIPPED | OUTPATIENT
Start: 2021-11-15 | End: 2022-12-09

## 2021-11-15 ASSESSMENT — MIFFLIN-ST. JEOR: SCORE: 1591.37

## 2021-11-15 ASSESSMENT — PAIN SCALES - GENERAL: PAINLEVEL: NO PAIN (0)

## 2021-11-15 NOTE — TELEPHONE ENCOUNTER
Prescription approved per [facility] Refill Protocol.  ISAAC JoinerN, RN, PHN  [place]  November 15, 2021

## 2021-11-16 DIAGNOSIS — I65.21 CAROTID STENOSIS, RIGHT: Primary | ICD-10-CM

## 2021-11-16 DIAGNOSIS — R79.89 ELEVATED BRAIN NATRIURETIC PEPTIDE (BNP) LEVEL: ICD-10-CM

## 2021-11-16 RX ORDER — FUROSEMIDE 20 MG
20 TABLET ORAL DAILY
Qty: 30 TABLET | Refills: 0 | Status: SHIPPED | OUTPATIENT
Start: 2021-11-16 | End: 2021-11-19

## 2021-11-16 ASSESSMENT — PATIENT HEALTH QUESTIONNAIRE - PHQ9: SUM OF ALL RESPONSES TO PHQ QUESTIONS 1-9: 2

## 2021-11-17 ENCOUNTER — TELEPHONE (OUTPATIENT)
Dept: FAMILY MEDICINE | Facility: OTHER | Age: 86
End: 2021-11-17
Payer: COMMERCIAL

## 2021-11-17 NOTE — TELEPHONE ENCOUNTER
"Per Adam Marc, call patient regarding recent results and triage. See message below.    \"I do not like the evidence of congestive heart failure by the numbers as well as the elevation in your D-dimer.  That suggests formation of blood clots what he should be.  I can have the nurse contact you to talk about this with your little bit further.     Staff please triage for potential pulmonary emboli or DVT.\"     Called and left a message for patient.     ISAAC BeckhamN, RN, PHN  Registered Nurse-Clinic Triage  [facility]  11/17/2021 at 12:51 PM    "

## 2021-11-17 NOTE — TELEPHONE ENCOUNTER
Spoke with patient.  Has been feeling about the same since Monday.  When he climbs the stairs he's short of breath.  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  That was tough but did finish.  He's still driving and feels pretty good.  Its just work with activity.  Otherwise at rest he feels pretty good.    Was COVID positive in sept.     [place] [employer].      texted provider for review    ISAAC MaciasN, RN, PHN  [facility] ~ Registered Nurse  Clinic Triage ~ [place] [facility] [place]  November 17, 2021

## 2021-11-17 NOTE — TELEPHONE ENCOUNTER
LM for the patient to return call to the clinic.   Please inform the patient of the message below. Please assist scheduling the patient with  tomorrow (11/18/21) for further evaluation. Okay to double book. If worsening symptoms the patient needs to go to the ER.     ISAAC JoinerN, RN, PHN  [place]  November 17, 2021

## 2021-11-17 NOTE — TELEPHONE ENCOUNTER
He needs a more comprehensive evaluation to rule out cardiac causes for his DELUCA.He has not had an EKG completed at his most recent visit. Can add him to my schedule tomorrow to be evaluated . Advise going to the ER if notices any significant worsening of his symptoms.

## 2021-11-18 NOTE — PROGRESS NOTES
Assessment & Plan     DELUCA (dyspnea on exertion)  Elevated brain natriuretic peptide (BNP) level  LBBB (left bundle branch block)  Concerns for worsening pathology of his cardiovascular function after review recent labs.  We are awaiting further labs from today.  Consult pending as is echocardiogram.  Follow-up as needed.  - CBC with platelets and differential; Future  - BNP-N terminal pro; Future  - Echocardiogram Complete; Future  - Troponin I; Future  - Adult Cardiology Eval Referral; Future  - Troponin I  - BNP-N terminal pro  - CBC with platelets and differential       Regular exercise  No follow-ups on file.    NEENA Gross [facility] MONICA Nevarez is a 85 year old who presents for the following health issues     HPI     Diabetes Follow-up and SOB    How often are you checking your blood sugar? One time daily  What time of day are you checking your blood sugars (select all that apply)?  Before meals  Have you had any blood sugars above 200?  No  Have you had any blood sugars below 70?  No    What symptoms do you notice when your blood sugar is low?  None    What concerns do you have today about your diabetes? None     Do you have any of these symptoms? (Select all that apply)  No numbness or tingling in feet.  No redness, sores or blisters on feet.  No complaints of excessive thirst.  No reports of blurry vision.  No significant changes to weight.      BP Readings from Last 2 Encounters:   11/15/21 122/62   06/25/21 116/72     Hemoglobin A1C (%)   Date Value   11/15/2021 7.4 (H)   06/25/2021 7.1 (H)   03/22/2021 7.4 (H)     LDL Cholesterol Calculated (mg/dL)   Date Value   11/15/2021 22   06/22/2020 40   05/30/2019 81     LDL Cholesterol Direct (mg/dL)   Date Value   06/25/2021 60   12/18/2020 79         Review of Systems   Constitutional, HEENT, cardiovascular, pulmonary, GI, , musculoskeletal, neuro, skin, endocrine and psych systems are negative, except as otherwise  "noted.      Objective    /60   Pulse 101   Temp 97.2  F (36.2  C) (Temporal)   Resp 20   Ht 1.816 m (5' 11.5\")   Wt 86.2 kg (190 lb)   SpO2 96%   BMI 26.13 kg/m    Body mass index is 26.13 kg/m .  Physical Exam   GENERAL: healthy, alert and no distress  NECK: no adenopathy, no asymmetry, masses, or scars and thyroid normal to palpation  RESP: lungs fairly clear to auscultation though distant in the bases bilaterally- no rales, rhonchi or wheezes  CV: regular rate and rhythm, normal S1 S2, no S3 or S4, no murmur, click or rub, no peripheral edema and peripheral pulses strong  ABDOMEN: soft, nontender, no hepatosplenomegaly, no masses and bowel sounds normal  MS: no gross musculoskeletal defects noted, no edema    No results found for this visit on 11/19/21.          "

## 2021-11-18 NOTE — TELEPHONE ENCOUNTER
Spoke to patient and informed of message below. Huddled with Adam Carvajal and clinic RN. Adam can see patient tomorrow at 1140. To the ED with worsening symptoms. Patient said he feels great today. He does agree to go to the ED with any worsening symptoms in the meantime.     ABEL Jacobo, RN  [facility]

## 2021-11-19 ENCOUNTER — OFFICE VISIT (OUTPATIENT)
Dept: FAMILY MEDICINE | Facility: OTHER | Age: 86
End: 2021-11-19
Payer: COMMERCIAL

## 2021-11-19 VITALS
HEIGHT: 72 IN | DIASTOLIC BLOOD PRESSURE: 60 MMHG | TEMPERATURE: 97.2 F | BODY MASS INDEX: 25.73 KG/M2 | SYSTOLIC BLOOD PRESSURE: 126 MMHG | WEIGHT: 190 LBS | RESPIRATION RATE: 20 BRPM | HEART RATE: 101 BPM | OXYGEN SATURATION: 96 %

## 2021-11-19 DIAGNOSIS — R06.09 DOE (DYSPNEA ON EXERTION): Primary | ICD-10-CM

## 2021-11-19 DIAGNOSIS — R79.89 ELEVATED BRAIN NATRIURETIC PEPTIDE (BNP) LEVEL: ICD-10-CM

## 2021-11-19 DIAGNOSIS — I44.7 LBBB (LEFT BUNDLE BRANCH BLOCK): Primary | ICD-10-CM

## 2021-11-19 DIAGNOSIS — I44.7 LBBB (LEFT BUNDLE BRANCH BLOCK): ICD-10-CM

## 2021-11-19 DIAGNOSIS — I65.21 CAROTID STENOSIS, RIGHT: ICD-10-CM

## 2021-11-19 DIAGNOSIS — R94.31 ABNORMAL ELECTROCARDIOGRAM (ECG) (EKG): ICD-10-CM

## 2021-11-19 LAB
ALBUMIN SERPL-MCNC: 3.7 G/DL (ref 3.4–5)
ALP SERPL-CCNC: 62 U/L (ref 40–150)
ALT SERPL W P-5'-P-CCNC: 32 U/L (ref 0–70)
ANION GAP SERPL CALCULATED.3IONS-SCNC: 4 MMOL/L (ref 3–14)
AST SERPL W P-5'-P-CCNC: 25 U/L (ref 0–45)
BASOPHILS # BLD AUTO: 0 10E3/UL (ref 0–0.2)
BASOPHILS NFR BLD AUTO: 0 %
BILIRUB SERPL-MCNC: 0.5 MG/DL (ref 0.2–1.3)
BUN SERPL-MCNC: 33 MG/DL (ref 7–30)
CALCIUM SERPL-MCNC: 8.5 MG/DL (ref 8.5–10.1)
CHLORIDE BLD-SCNC: 104 MMOL/L (ref 94–109)
CO2 SERPL-SCNC: 27 MMOL/L (ref 20–32)
CREAT SERPL-MCNC: 1.21 MG/DL (ref 0.66–1.25)
EOSINOPHIL # BLD AUTO: 0.1 10E3/UL (ref 0–0.7)
EOSINOPHIL NFR BLD AUTO: 1 %
ERYTHROCYTE [DISTWIDTH] IN BLOOD BY AUTOMATED COUNT: 14.9 % (ref 10–15)
GFR SERPL CREATININE-BSD FRML MDRD: 55 ML/MIN/1.73M2
GLUCOSE BLD-MCNC: 133 MG/DL (ref 70–99)
HCT VFR BLD AUTO: 37.8 % (ref 40–53)
HGB BLD-MCNC: 12.4 G/DL (ref 13.3–17.7)
LYMPHOCYTES # BLD AUTO: 0.9 10E3/UL (ref 0.8–5.3)
LYMPHOCYTES NFR BLD AUTO: 13 %
MCH RBC QN AUTO: 34.3 PG (ref 26.5–33)
MCHC RBC AUTO-ENTMCNC: 32.8 G/DL (ref 31.5–36.5)
MCV RBC AUTO: 104 FL (ref 78–100)
MONOCYTES # BLD AUTO: 0.6 10E3/UL (ref 0–1.3)
MONOCYTES NFR BLD AUTO: 9 %
NEUTROPHILS # BLD AUTO: 5 10E3/UL (ref 1.6–8.3)
NEUTROPHILS NFR BLD AUTO: 76 %
NT-PROBNP SERPL-MCNC: 6457 PG/ML (ref 0–450)
PLATELET # BLD AUTO: 294 10E3/UL (ref 150–450)
POTASSIUM BLD-SCNC: 4.9 MMOL/L (ref 3.4–5.3)
PROT SERPL-MCNC: 7.3 G/DL (ref 6.8–8.8)
RBC # BLD AUTO: 3.62 10E6/UL (ref 4.4–5.9)
SODIUM SERPL-SCNC: 135 MMOL/L (ref 133–144)
TROPONIN I SERPL-MCNC: 0.68 UG/L (ref 0–0.04)
WBC # BLD AUTO: 6.6 10E3/UL (ref 4–11)

## 2021-11-19 PROCEDURE — 80053 COMPREHEN METABOLIC PANEL: CPT | Performed by: PHYSICIAN ASSISTANT

## 2021-11-19 PROCEDURE — 84484 ASSAY OF TROPONIN QUANT: CPT | Performed by: PHYSICIAN ASSISTANT

## 2021-11-19 PROCEDURE — 99214 OFFICE O/P EST MOD 30 MIN: CPT | Performed by: PHYSICIAN ASSISTANT

## 2021-11-19 PROCEDURE — 83880 ASSAY OF NATRIURETIC PEPTIDE: CPT | Performed by: PHYSICIAN ASSISTANT

## 2021-11-19 PROCEDURE — 85025 COMPLETE CBC W/AUTO DIFF WBC: CPT | Performed by: PHYSICIAN ASSISTANT

## 2021-11-19 PROCEDURE — 93000 ELECTROCARDIOGRAM COMPLETE: CPT | Performed by: PHYSICIAN ASSISTANT

## 2021-11-19 PROCEDURE — 36415 COLL VENOUS BLD VENIPUNCTURE: CPT | Performed by: PHYSICIAN ASSISTANT

## 2021-11-19 RX ORDER — CARVEDILOL 3.12 MG/1
3.12 TABLET ORAL 2 TIMES DAILY WITH MEALS
Qty: 60 TABLET | Refills: 0 | Status: SHIPPED | OUTPATIENT
Start: 2021-11-19 | End: 2022-12-09

## 2021-11-19 RX ORDER — FUROSEMIDE 20 MG
20 TABLET ORAL 2 TIMES DAILY
Qty: 60 TABLET | Refills: 0 | Status: SHIPPED | OUTPATIENT
Start: 2021-11-19

## 2021-11-19 ASSESSMENT — MIFFLIN-ST. JEOR: SCORE: 1586.89

## 2021-11-19 ASSESSMENT — PAIN SCALES - GENERAL: PAINLEVEL: NO PAIN (0)

## 2021-11-22 ENCOUNTER — TELEPHONE (OUTPATIENT)
Dept: FAMILY MEDICINE | Facility: OTHER | Age: 86
End: 2021-11-22
Payer: COMMERCIAL

## 2021-11-22 NOTE — TELEPHONE ENCOUNTER
Lm for patient to call us back    Please call patient his kidney and liver function is stable.   Edwin Argueta PA-C

## 2021-11-23 ENCOUNTER — HOSPITAL ENCOUNTER (OUTPATIENT)
Dept: NUCLEAR MEDICINE | Facility: CLINIC | Age: 86
Setting detail: NUCLEAR MEDICINE
End: 2021-11-23
Attending: PHYSICIAN ASSISTANT
Payer: COMMERCIAL

## 2021-11-23 ENCOUNTER — HOSPITAL ENCOUNTER (OUTPATIENT)
Dept: CARDIOLOGY | Facility: CLINIC | Age: 86
End: 2021-11-23
Attending: PHYSICIAN ASSISTANT
Payer: COMMERCIAL

## 2021-11-23 ENCOUNTER — TRANSFERRED RECORDS (OUTPATIENT)
Dept: HEALTH INFORMATION MANAGEMENT | Facility: CLINIC | Age: 86
End: 2021-11-23

## 2021-11-23 DIAGNOSIS — R79.89 ELEVATED BRAIN NATRIURETIC PEPTIDE (BNP) LEVEL: ICD-10-CM

## 2021-11-23 DIAGNOSIS — R06.09 DOE (DYSPNEA ON EXERTION): ICD-10-CM

## 2021-11-23 DIAGNOSIS — I44.7 LBBB (LEFT BUNDLE BRANCH BLOCK): ICD-10-CM

## 2021-11-23 DIAGNOSIS — I65.21 CAROTID STENOSIS, RIGHT: ICD-10-CM

## 2021-11-23 DIAGNOSIS — R94.31 ABNORMAL ELECTROCARDIOGRAM (ECG) (EKG): ICD-10-CM

## 2021-11-23 LAB
BI-PLANE LVEF ECHO: NORMAL
CREATININE (EXTERNAL): 1.32 MG/DL (ref 0.72–1.25)
CV STRESS MAX HR HE: 94
GFR ESTIMATED (EXTERNAL): 52 ML/MIN/1.73M2
GFR ESTIMATED (IF AFRICAN AMERICAN) (EXTERNAL): >60 ML/MIN/1.73M2
GLUCOSE (EXTERNAL): 194 MG/DL (ref 65–100)
HBA1C MFR BLD: 7.7 %
POTASSIUM (EXTERNAL): 4.6 MMOL/L (ref 3.5–5)
RATE PRESSURE PRODUCT: NORMAL
STRESS ECHO BASELINE DIASTOLIC HE: 84
STRESS ECHO BASELINE HR: 88 BPM
STRESS ECHO BASELINE SYSTOLIC BP: 130
STRESS ECHO CALCULATED PERCENT HR: 69 %
STRESS ECHO LAST STRESS DIASTOLIC BP: 78
STRESS ECHO LAST STRESS SYSTOLIC BP: 118
STRESS ECHO TARGET HR: 137

## 2021-11-23 PROCEDURE — A9502 TC99M TETROFOSMIN: HCPCS | Performed by: PHYSICIAN ASSISTANT

## 2021-11-23 PROCEDURE — 255N000002 HC RX 255 OP 636: Performed by: INTERNAL MEDICINE

## 2021-11-23 PROCEDURE — 78452 HT MUSCLE IMAGE SPECT MULT: CPT

## 2021-11-23 PROCEDURE — 93306 TTE W/DOPPLER COMPLETE: CPT | Mod: 26 | Performed by: INTERNAL MEDICINE

## 2021-11-23 PROCEDURE — 250N000011 HC RX IP 250 OP 636: Performed by: PHYSICIAN ASSISTANT

## 2021-11-23 PROCEDURE — 78452 HT MUSCLE IMAGE SPECT MULT: CPT | Mod: 26 | Performed by: INTERNAL MEDICINE

## 2021-11-23 PROCEDURE — C8929 TTE W OR WO FOL WCON,DOPPLER: HCPCS

## 2021-11-23 PROCEDURE — 343N000001 HC RX 343: Performed by: PHYSICIAN ASSISTANT

## 2021-11-23 PROCEDURE — 93018 CV STRESS TEST I&R ONLY: CPT | Performed by: INTERNAL MEDICINE

## 2021-11-23 PROCEDURE — 93016 CV STRESS TEST SUPVJ ONLY: CPT | Performed by: INTERNAL MEDICINE

## 2021-11-23 PROCEDURE — 999N000208 ECHOCARDIOGRAM COMPLETE

## 2021-11-23 PROCEDURE — 93017 CV STRESS TEST TRACING ONLY: CPT

## 2021-11-23 RX ORDER — REGADENOSON 0.08 MG/ML
0.4 INJECTION, SOLUTION INTRAVENOUS ONCE
Status: COMPLETED | OUTPATIENT
Start: 2021-11-23 | End: 2021-11-23

## 2021-11-23 RX ADMIN — TETROFOSMIN 31.9 MCI.: 1.38 INJECTION, POWDER, LYOPHILIZED, FOR SOLUTION INTRAVENOUS at 13:50

## 2021-11-23 RX ADMIN — HUMAN ALBUMIN MICROSPHERES AND PERFLUTREN 9 ML: 10; .22 INJECTION, SOLUTION INTRAVENOUS at 14:28

## 2021-11-23 RX ADMIN — REGADENOSON 0.4 MG: 0.08 INJECTION, SOLUTION INTRAVENOUS at 13:45

## 2021-11-23 RX ADMIN — TETROFOSMIN 10.4 MCI.: 1.38 INJECTION, POWDER, LYOPHILIZED, FOR SOLUTION INTRAVENOUS at 12:28

## 2021-11-23 NOTE — PROGRESS NOTES
After reviewing patient's echocardiogram with technician and lexiscan results from today, call placed to Adam Lindsey and Dr. Clark to huddle and coordinate patient's care.   With his positive troponin from 11/19/2021, his stress test results and his preliminary echocardiogram and his exertional dyspnea- patient was advised to go to the emergency room. Patient preferred to go to [facility] ER after considering they can triage and perform cardiac care all within the same facility. Dr. Clark and Adam Winters agree with plan of care. Patient to go directly from [facility] to [facility]. Call 911 if any symptoms were to present.   Paperwork including med list, today's results, lab work and face sheet provided to patient to present to ER staff upon arrival.   Patient and wife agree with plan and were grateful for the care coordination provided to ensure the safest route of care without delay. Patient left asymptomatic.     Zainab Griffin RN and SHIRA Martins

## 2021-11-24 NOTE — TELEPHONE ENCOUNTER
Attempted to reach the patient with the following results:  Per demographics left message on voicemail notifying the patient   Mary SIEGEL CMA

## 2021-11-29 ENCOUNTER — TRANSFERRED RECORDS (OUTPATIENT)
Dept: HEALTH INFORMATION MANAGEMENT | Facility: CLINIC | Age: 86
End: 2021-11-29
Payer: COMMERCIAL

## 2021-12-06 PROBLEM — R57.0 CARDIOGENIC SHOCK (H): Status: ACTIVE | Noted: 2021-12-06

## 2021-12-06 PROBLEM — I21.4 NSTEMI (NON-ST ELEVATED MYOCARDIAL INFARCTION) (H): Status: ACTIVE | Noted: 2021-11-23

## 2021-12-06 PROBLEM — I25.810 CORONARY ARTERY DISEASE INVOLVING AUTOLOGOUS ARTERY CORONARY BYPASS GRAFT WITHOUT ANGINA PECTORIS: Status: ACTIVE | Noted: 2021-12-06

## 2021-12-07 ENCOUNTER — TELEPHONE (OUTPATIENT)
Dept: FAMILY MEDICINE | Facility: OTHER | Age: 86
End: 2021-12-07
Payer: COMMERCIAL

## 2021-12-07 NOTE — TELEPHONE ENCOUNTER
Adam or covering provider please review and advise.     JUAN MANUEL Flores calling from [place]  PHONE: [phone]    BACKGROUND:   Patient was seen at [facility] for a NSTEMI- CABAG X3    REQUESTED ORDERS:   1. NURSING- Education (Pain, medications, fall preventions)  2 PT & OT  3. Home healthaid    ABEL Joiner, RN, PHN  [place] [place]  December 7, 2021

## 2021-12-08 ENCOUNTER — TELEPHONE (OUTPATIENT)
Dept: FAMILY MEDICINE | Facility: OTHER | Age: 86
End: 2021-12-08
Payer: COMMERCIAL

## 2021-12-08 NOTE — TELEPHONE ENCOUNTER
Sasha Cagle from [facility] is looking to see if you could give the verbal okay for  PT 1 additional visit for this week  2 visit for 1 week  1 visit for 2 weeks  For PT Treatment strengthening, endurance, balance, transfers and home exercise program    You can Reach Sasha Cagle at [phone] and okay to  if needed

## 2021-12-08 NOTE — TELEPHONE ENCOUNTER
Reason for Call:  Form, our goal is to have forms completed with 72 hours, however, some forms may require a visit or additional information.    Type of letter, form or note:  medical    Who is the form from?: [facility] needs signature (if other please explain)    Where did the form come from: form was faxed in    What clinic location was the form placed at?: [facility] [phone]    Where the form was placed: Team D Box/Folder    What number is listed as a contact on the form?: [phone]       Additional comments: Please sign and fax to [phone]    Call taken on 12/8/2021 at 9:10 AM by Rachell Lopez

## 2021-12-08 NOTE — TELEPHONE ENCOUNTER
Form placed in [facility] box for review/signature.    Mary SIEGEL CMA

## 2021-12-09 ENCOUNTER — MEDICAL CORRESPONDENCE (OUTPATIENT)
Dept: HEALTH INFORMATION MANAGEMENT | Facility: CLINIC | Age: 86
End: 2021-12-09
Payer: COMMERCIAL

## 2021-12-09 NOTE — TELEPHONE ENCOUNTER
Left a message for proving requested physical therapy orders  Electronically signed:    Adam Carvajal PA-C

## 2021-12-09 NOTE — TELEPHONE ENCOUNTER
Left a message approving of the orders as requested.  Electronically signed:    Adam Carvajal PA-C

## 2021-12-09 NOTE — TELEPHONE ENCOUNTER
In my MA box (overhead file over my desk) for completion and scan of the document into the medical record.  Electronically signed:    Adam Carvajal PA-C

## 2021-12-28 ENCOUNTER — TELEPHONE (OUTPATIENT)
Dept: FAMILY MEDICINE | Facility: OTHER | Age: 86
End: 2021-12-28
Payer: COMMERCIAL

## 2021-12-28 NOTE — TELEPHONE ENCOUNTER
Reason for Call:  Form, our goal is to have forms completed with 72 hours, however, some forms may require a visit or additional information.    Type of letter, form or note:  medical    Who is the form from?: Home care    Where did the form come from: form was faxed in    What clinic location was the form placed at?: [facility] [phone]    Where the form was placed: Team D Box/Folder    What number is listed as a contact on the form?: [phone]       Additional comments: Fax:[phone]    Call taken on 12/28/2021 at 12:30 PM by Tiffani Lyons

## 2021-12-30 ENCOUNTER — MEDICAL CORRESPONDENCE (OUTPATIENT)
Dept: HEALTH INFORMATION MANAGEMENT | Facility: CLINIC | Age: 86
End: 2021-12-30
Payer: COMMERCIAL

## 2022-01-10 ENCOUNTER — TELEPHONE (OUTPATIENT)
Dept: FAMILY MEDICINE | Facility: OTHER | Age: 87
End: 2022-01-10
Payer: COMMERCIAL

## 2022-01-10 NOTE — TELEPHONE ENCOUNTER
Reason for Call:  Form, our goal is to have forms completed with 72 hours, however, some forms may require a visit or additional information.    Type of letter, form or note:  medical    Who is the form from?: Home care    Where did the form come from: form was faxed in    What clinic location was the form placed at?: [facility] [phone]    Where the form was placed: Team D form bin    What number is listed as a contact on the form?: fax [phone]       Additional comments: Please complete and fax    Call taken on 1/10/2022 at 2:13 PM by Honey Hull

## 2022-01-14 ENCOUNTER — TELEPHONE (OUTPATIENT)
Dept: FAMILY MEDICINE | Facility: OTHER | Age: 87
End: 2022-01-14
Payer: COMMERCIAL

## 2022-01-14 DIAGNOSIS — K21.00 GASTROESOPHAGEAL REFLUX DISEASE WITH ESOPHAGITIS, UNSPECIFIED WHETHER HEMORRHAGE: Primary | ICD-10-CM

## 2022-01-14 RX ORDER — FAMOTIDINE 20 MG/1
20 TABLET, FILM COATED ORAL 2 TIMES DAILY
Qty: 90 TABLET | Refills: 3 | Status: SHIPPED | OUTPATIENT
Start: 2022-01-14 | End: 2023-01-24

## 2022-01-14 NOTE — TELEPHONE ENCOUNTER
Not on active (or historical) medication list.     Alexia Ralph, BSN, RN, PHN  Registered Nurse-Clinic Triage  [facility]  1/14/2022 at 11:42 AM

## 2022-01-20 ENCOUNTER — TRANSFERRED RECORDS (OUTPATIENT)
Dept: HEALTH INFORMATION MANAGEMENT | Facility: CLINIC | Age: 87
End: 2022-01-20
Payer: COMMERCIAL

## 2022-01-20 LAB — RETINOPATHY: NEGATIVE

## 2022-02-05 DIAGNOSIS — F41.9 ANXIETY AND DEPRESSION: ICD-10-CM

## 2022-02-05 DIAGNOSIS — F32.A ANXIETY AND DEPRESSION: ICD-10-CM

## 2022-02-07 RX ORDER — PAROXETINE 30 MG/1
TABLET, FILM COATED ORAL
Qty: 90 TABLET | Refills: 1 | Status: SHIPPED | OUTPATIENT
Start: 2022-02-07 | End: 2022-08-08

## 2022-02-15 DIAGNOSIS — I25.5 ISCHEMIC CARDIOMYOPATHY: Primary | ICD-10-CM

## 2022-04-08 ENCOUNTER — OFFICE VISIT (OUTPATIENT)
Dept: FAMILY MEDICINE | Facility: OTHER | Age: 87
End: 2022-04-08
Payer: COMMERCIAL

## 2022-04-08 VITALS
HEART RATE: 80 BPM | TEMPERATURE: 97.9 F | HEIGHT: 72 IN | OXYGEN SATURATION: 97 % | RESPIRATION RATE: 16 BRPM | WEIGHT: 176 LBS | SYSTOLIC BLOOD PRESSURE: 122 MMHG | DIASTOLIC BLOOD PRESSURE: 60 MMHG | BODY MASS INDEX: 23.84 KG/M2

## 2022-04-08 DIAGNOSIS — I44.7 LBBB (LEFT BUNDLE BRANCH BLOCK): ICD-10-CM

## 2022-04-08 DIAGNOSIS — R57.0 CARDIOGENIC SHOCK (H): ICD-10-CM

## 2022-04-08 DIAGNOSIS — I21.4 NSTEMI (NON-ST ELEVATED MYOCARDIAL INFARCTION) (H): ICD-10-CM

## 2022-04-08 DIAGNOSIS — Z86.16 PERSONAL HISTORY OF COVID-19: ICD-10-CM

## 2022-04-08 DIAGNOSIS — F41.9 ANXIETY: ICD-10-CM

## 2022-04-08 DIAGNOSIS — N18.31 CHRONIC KIDNEY DISEASE, STAGE 3A (H): ICD-10-CM

## 2022-04-08 DIAGNOSIS — G47.00 PERSISTENT INSOMNIA: ICD-10-CM

## 2022-04-08 DIAGNOSIS — I25.810 CORONARY ARTERY DISEASE INVOLVING AUTOLOGOUS ARTERY CORONARY BYPASS GRAFT WITHOUT ANGINA PECTORIS: ICD-10-CM

## 2022-04-08 DIAGNOSIS — I25.5 ISCHEMIC CARDIOMYOPATHY: ICD-10-CM

## 2022-04-08 DIAGNOSIS — F32.5 MAJOR DEPRESSION IN COMPLETE REMISSION (H): Primary | ICD-10-CM

## 2022-04-08 DIAGNOSIS — E11.9 TYPE 2 DIABETES MELLITUS WITHOUT COMPLICATION, WITHOUT LONG-TERM CURRENT USE OF INSULIN (H): ICD-10-CM

## 2022-04-08 LAB
ALBUMIN SERPL-MCNC: 3.8 G/DL (ref 3.4–5)
ALP SERPL-CCNC: 69 U/L (ref 40–150)
ALT SERPL W P-5'-P-CCNC: 27 U/L (ref 0–70)
ANION GAP SERPL CALCULATED.3IONS-SCNC: 7 MMOL/L (ref 3–14)
AST SERPL W P-5'-P-CCNC: 21 U/L (ref 0–45)
BILIRUB SERPL-MCNC: 0.3 MG/DL (ref 0.2–1.3)
BUN SERPL-MCNC: 28 MG/DL (ref 7–30)
CALCIUM SERPL-MCNC: 8.6 MG/DL (ref 8.5–10.1)
CHLORIDE BLD-SCNC: 104 MMOL/L (ref 94–109)
CO2 SERPL-SCNC: 25 MMOL/L (ref 20–32)
CREAT SERPL-MCNC: 0.97 MG/DL (ref 0.66–1.25)
GFR SERPL CREATININE-BSD FRML MDRD: 77 ML/MIN/1.73M2
GLUCOSE BLD-MCNC: 224 MG/DL (ref 70–99)
HBA1C MFR BLD: 7.3 % (ref 0–5.6)
LDLC SERPL CALC-MCNC: 54 MG/DL
POTASSIUM BLD-SCNC: 5.6 MMOL/L (ref 3.4–5.3)
PROT SERPL-MCNC: 7.2 G/DL (ref 6.8–8.8)
SODIUM SERPL-SCNC: 136 MMOL/L (ref 133–144)

## 2022-04-08 PROCEDURE — 80053 COMPREHEN METABOLIC PANEL: CPT | Performed by: PHYSICIAN ASSISTANT

## 2022-04-08 PROCEDURE — 83721 ASSAY OF BLOOD LIPOPROTEIN: CPT | Performed by: PHYSICIAN ASSISTANT

## 2022-04-08 PROCEDURE — 83036 HEMOGLOBIN GLYCOSYLATED A1C: CPT | Performed by: PHYSICIAN ASSISTANT

## 2022-04-08 PROCEDURE — 36415 COLL VENOUS BLD VENIPUNCTURE: CPT | Performed by: PHYSICIAN ASSISTANT

## 2022-04-08 PROCEDURE — 99214 OFFICE O/P EST MOD 30 MIN: CPT | Performed by: PHYSICIAN ASSISTANT

## 2022-04-08 RX ORDER — FAMOTIDINE 20 MG/1
20 TABLET, FILM COATED ORAL DAILY
COMMUNITY
Start: 2021-12-03 | End: 2022-12-09

## 2022-04-08 RX ORDER — METOPROLOL SUCCINATE 50 MG/1
50 TABLET, EXTENDED RELEASE ORAL DAILY
COMMUNITY
Start: 2021-12-03 | End: 2023-03-24

## 2022-04-08 RX ORDER — NORTRIPTYLINE HCL 25 MG
25-50 CAPSULE ORAL AT BEDTIME
Qty: 60 CAPSULE | Refills: 3 | Status: SHIPPED | OUTPATIENT
Start: 2022-04-08

## 2022-04-08 RX ORDER — ACETAMINOPHEN 500 MG
1000 TABLET ORAL DAILY
COMMUNITY
Start: 2021-12-03

## 2022-04-08 RX ORDER — NITROGLYCERIN 0.4 MG/1
TABLET SUBLINGUAL PRN
COMMUNITY
Start: 2021-12-03 | End: 2022-12-09

## 2022-04-08 ASSESSMENT — PAIN SCALES - GENERAL: PAINLEVEL: NO PAIN (0)

## 2022-04-08 NOTE — PROGRESS NOTES
Assessment & Plan     Major depression in complete remission (H)  Doing fairly well all things considered but has noticed some changes with respect to his emotional lability since his heart disease manifested itself.    Cardiogenic shock (H)  Has done very well since intervention related to coronary artery disease.  Rechecking some related labs.  - Hemoglobin A1c; Future  - LDL cholesterol direct; Future  - Comprehensive metabolic panel (BMP + Alb, Alk Phos, ALT, AST, Total. Bili, TP); Future  - blood glucose monitoring (NO BRAND SPECIFIED) meter device kit; Use to test blood sugar 1 times daily or as directed.  - Hemoglobin A1c  - LDL cholesterol direct  - Comprehensive metabolic panel (BMP + Alb, Alk Phos, ALT, AST, Total. Bili, TP)    Type 2 diabetes mellitus without complication, without long-term current use of insulin (H)  No new concerns with respect to this rechecking related labs.  Adjust medications based on results and follow-up based on results.  - Hemoglobin A1c; Future  - LDL cholesterol direct; Future  - Comprehensive metabolic panel (BMP + Alb, Alk Phos, ALT, AST, Total. Bili, TP); Future  - blood glucose monitoring (NO BRAND SPECIFIED) meter device kit; Use to test blood sugar 1 times daily or as directed.  - Hemoglobin A1c  - LDL cholesterol direct  - Comprehensive metabolic panel (BMP + Alb, Alk Phos, ALT, AST, Total. Bili, TP)    Chronic kidney disease, stage 3a (H)  Historically noted rechecking related labs  - Hemoglobin A1c; Future  - LDL cholesterol direct; Future  - Comprehensive metabolic panel (BMP + Alb, Alk Phos, ALT, AST, Total. Bili, TP); Future  - blood glucose monitoring (NO BRAND SPECIFIED) meter device kit; Use to test blood sugar 1 times daily or as directed.  - Hemoglobin A1c  - LDL cholesterol direct  - Comprehensive metabolic panel (BMP + Alb, Alk Phos, ALT, AST, Total. Bili, TP)    Anxiety  Overall he thinks that he is doing fairly well all things considered.  We will hold  off on any medications at this for now.  Declines any type of counseling at the moment.    Persistent insomnia  Has been struggling with sleep since he had his heart surgery.  Order medications as noted below and follow-up based on results.  - nortriptyline (PAMELOR) 25 MG capsule; Take 1-2 capsules (25-50 mg) by mouth At Bedtime    Coronary artery disease involving autologous artery coronary bypass graft without angina pectoris  Historically noted.  Taking a look at some related labs and follow-up based on results.  - Hemoglobin A1c; Future  - LDL cholesterol direct; Future  - Comprehensive metabolic panel (BMP + Alb, Alk Phos, ALT, AST, Total. Bili, TP); Future  - blood glucose monitoring (NO BRAND SPECIFIED) meter device kit; Use to test blood sugar 1 times daily or as directed.  - Hemoglobin A1c  - LDL cholesterol direct  - Comprehensive metabolic panel (BMP + Alb, Alk Phos, ALT, AST, Total. Bili, TP)    LBBB (left bundle branch block)  - Hemoglobin A1c; Future  - LDL cholesterol direct; Future  - Comprehensive metabolic panel (BMP + Alb, Alk Phos, ALT, AST, Total. Bili, TP); Future  - blood glucose monitoring (NO BRAND SPECIFIED) meter device kit; Use to test blood sugar 1 times daily or as directed.  - Hemoglobin A1c  - LDL cholesterol direct  - Comprehensive metabolic panel (BMP + Alb, Alk Phos, ALT, AST, Total. Bili, TP)    NSTEMI (non-ST elevated myocardial infarction) (H)  - Hemoglobin A1c; Future  - LDL cholesterol direct; Future  - Comprehensive metabolic panel (BMP + Alb, Alk Phos, ALT, AST, Total. Bili, TP); Future  - blood glucose monitoring (NO BRAND SPECIFIED) meter device kit; Use to test blood sugar 1 times daily or as directed.  - Hemoglobin A1c  - LDL cholesterol direct  - Comprehensive metabolic panel (BMP + Alb, Alk Phos, ALT, AST, Total. Bili, TP)    Personal history of COVID-19 - September 2021  - Hemoglobin A1c; Future  - LDL cholesterol direct; Future  - Comprehensive metabolic panel (BMP  "+ Alb, Alk Phos, ALT, AST, Total. Bili, TP); Future  - blood glucose monitoring (NO BRAND SPECIFIED) meter device kit; Use to test blood sugar 1 times daily or as directed.  - Hemoglobin A1c  - LDL cholesterol direct  - Comprehensive metabolic panel (BMP + Alb, Alk Phos, ALT, AST, Total. Bili, TP)    Ischemic cardiomyopathy  See above      Reviewed his admission and discharge summary from outside hospital     No follow-ups on file.    Adam Carvajal PA-C  [facility] MONICA Nevarez is a 86 year old who presents for the following health issues   HPI       Hospital Follow-up Visit:    Hospital/Nursing Home/IP Rehab Facility: [facility]  Date of Admission: 11/23/21  Date of Discharge: 12/3/21  Reason(s) for Admission: SOB, Acute Coronary Syndrome      Was your hospitalization related to COVID-19? No   Problems taking medications regularly:  None  Medication changes since discharge: Tylenol, Famotidine, Nitrostat, Metoprolol  Problems adhering to non-medication therapy:  None    Summary of hospitalization:  CareEverywhere information obtained and reviewed  Diagnostic Tests/Treatments reviewed.  Follow up needed: Continue to follow-up with cardiology strongly recommended.  Other Healthcare Providers Involved in Patient s Care:         continue cardiovascular rehab is encouraged.    Update since discharge: improved. Post Discharge Medication Reconciliation: .  Plan of care communicated with patient and family              Review of Systems   Constitutional, HEENT, cardiovascular, pulmonary, GI, , musculoskeletal, neuro, skin, endocrine and psych systems are negative, except as otherwise noted.      Objective    /60 (BP Location: Right arm, Patient Position: Chair, Cuff Size: Adult Regular)   Pulse 80   Temp 97.9  F (36.6  C) (Temporal)   Resp 16   Ht 1.816 m (5' 11.5\")   Wt 79.8 kg (176 lb)   SpO2 97%   BMI 24.20 kg/m    Body mass index is 24.2 kg/m .  Physical Exam "   GENERAL: healthy, alert and no distress  NECK: no adenopathy, no asymmetry, masses, or scars and thyroid normal to palpation  RESP: lungs clear to auscultation - no rales, rhonchi or wheezes  CV: regular rate and rhythm, normal S1 S2, no S3 or S4, no murmur, click or rub, no peripheral edema and peripheral pulses strong  ABDOMEN: soft, nontender, no hepatosplenomegaly, no masses and bowel sounds normal  MS: no gross musculoskeletal defects noted, minimal edema  SKIN: no suspicious lesions or rashes to exposed visible skin today.  Multiple skin tags present that are longstanding.  NEURO: Normal strength and tone, mentation intact and speech normal  PSYCH: mentation appears normal, affect normal/bright    No results found for this or any previous visit (from the past 24 hour(s)).

## 2022-04-11 ENCOUNTER — TELEPHONE (OUTPATIENT)
Dept: FAMILY MEDICINE | Facility: OTHER | Age: 87
End: 2022-04-11
Payer: COMMERCIAL

## 2022-04-11 NOTE — TELEPHONE ENCOUNTER
Left message for patient to call back to give lab results message.   Vijaya Medellin RN on 4/11/2022 at 8:51 AM

## 2022-04-11 NOTE — TELEPHONE ENCOUNTER
----- Message from Adam Marc PA-C sent at 4/9/2022  7:27 AM CDT -----  Your potassium level is slightly high.  I think it would be wise to cut back on your Lasix to just once a day and recheck this in 2 weeks.  Your diabetes control is adequate at this point time but I would strongly recommend a close look at diet and increase physical exercise to control this.  Continue to take your diabetic medications.  Follow-up in 3 months.  LDL cholesterol is very good at this point.

## 2022-04-11 NOTE — TELEPHONE ENCOUNTER
Contacted pt. He states that they told him when he was at [facility] 4 months ago to cut back to 1 tablet of lasix 20 mg once daily. So he has been taking 1 tablet, once daily already.    Please advise on how pt should reduce lasix based on above information.     Pt was advised of other lab results and provider message. He had no further questions.    Alexia Ralph, ISAACN, RN, PHN  Registered Nurse-Clinic Triage  [facility]  4/11/2022 at 12:59 PM

## 2022-04-11 NOTE — TELEPHONE ENCOUNTER
Please have him begin to take that twice a day once in the morning once at night.  Electronically signed:    Adam Carvajal PA-C

## 2022-04-11 NOTE — TELEPHONE ENCOUNTER
Spoke to patient and gave him provider's orders for potassium. He had no further questions.     Vijaya Medellin RN on 4/11/2022 at 3:46 PM

## 2022-04-11 NOTE — TELEPHONE ENCOUNTER
Patient states his currently taking potassium citrate 10 mEq once a day. He is currently taking this in the evening.     Routing back to provider to review.     Christen GRAYN, RN

## 2022-04-11 NOTE — TELEPHONE ENCOUNTER
That being the case I would simply have him start a potassium supplement but maintain his current level of Lasix.  In the past it looks like he was on potassium citrate 3 times a day but I do not know that that has been refilled in quite some time.  Please confirm his current use.  We may need to adjust that dose instead.  Electronically signed:    Adam Carvajal PA-C

## 2022-08-08 ENCOUNTER — DOCUMENTATION ONLY (OUTPATIENT)
Dept: LAB | Facility: OTHER | Age: 87
End: 2022-08-08

## 2022-08-08 DIAGNOSIS — F41.9 ANXIETY AND DEPRESSION: ICD-10-CM

## 2022-08-08 DIAGNOSIS — F32.A ANXIETY AND DEPRESSION: ICD-10-CM

## 2022-08-08 DIAGNOSIS — E11.9 TYPE 2 DIABETES MELLITUS WITHOUT COMPLICATION, WITHOUT LONG-TERM CURRENT USE OF INSULIN (H): ICD-10-CM

## 2022-08-08 DIAGNOSIS — N18.31 CHRONIC KIDNEY DISEASE, STAGE 3A (H): Primary | ICD-10-CM

## 2022-08-08 RX ORDER — PAROXETINE 30 MG/1
TABLET, FILM COATED ORAL
Qty: 90 TABLET | Refills: 1 | Status: SHIPPED | OUTPATIENT
Start: 2022-08-08 | End: 2023-02-06

## 2022-08-08 NOTE — PROGRESS NOTES
Patient is requesting Hemoglobin a1c. I signed HMPO, please place lab orders for patient as needed.     Thank you,    Amy LEDBETTER (T) [facility]

## 2022-08-11 ENCOUNTER — LAB (OUTPATIENT)
Dept: LAB | Facility: OTHER | Age: 87
End: 2022-08-11
Payer: COMMERCIAL

## 2022-08-11 DIAGNOSIS — E11.9 TYPE 2 DIABETES MELLITUS WITHOUT COMPLICATION, WITHOUT LONG-TERM CURRENT USE OF INSULIN (H): ICD-10-CM

## 2022-08-11 DIAGNOSIS — N18.31 CHRONIC KIDNEY DISEASE, STAGE 3A (H): ICD-10-CM

## 2022-08-11 LAB
CREAT UR-MCNC: 110 MG/DL
HBA1C MFR BLD: 7.1 % (ref 0–5.6)
MICROALBUMIN UR-MCNC: 66 MG/L
MICROALBUMIN/CREAT UR: 60 MG/G CR (ref 0–17)

## 2022-08-11 PROCEDURE — 36415 COLL VENOUS BLD VENIPUNCTURE: CPT

## 2022-08-11 PROCEDURE — 83036 HEMOGLOBIN GLYCOSYLATED A1C: CPT

## 2022-08-11 PROCEDURE — 82043 UR ALBUMIN QUANTITATIVE: CPT

## 2022-08-12 ENCOUNTER — TELEPHONE (OUTPATIENT)
Dept: FAMILY MEDICINE | Facility: OTHER | Age: 87
End: 2022-08-12

## 2022-08-12 NOTE — TELEPHONE ENCOUNTER
----- Message from Adam Marc PA-C sent at 8/12/2022  6:26 AM CDT -----  Stable diabetes labs noted.  Slight elevation in albumin when compared to other years is present.  Rechecking this in 3 months would be wise.  No changes in approach to diabetes.    Please, send letter with a copy of results and any advice listed.  Electronically signed:    Adam Marc PA-C

## 2022-08-12 NOTE — LETTER
Montrell Nevarez,      Per Adam Carvajal he states diabetes is stable, so no changes to diabetes. Slight elevation in albumin compared to other years. Also wants you to get recheck these in labs in the next 3 months.     Lab results from 8/11/2022.      Recent Results (from the past 168 hour(s))   Hemoglobin A1c   Result Value Ref Range Status    Hemoglobin A1C 7.1 (H) 0.0 - 5.6 % Final   Albumin Random Urine Quantitative with Creat Ratio   Result Value Ref Range Status    Creatinine Urine mg/dL 110 mg/dL Final    Albumin Urine mg/L 66 mg/L Final    Albumin Urine mg/g Cr 60.00 (H) 0.00 - 17.00 mg/g Cr Final       Please call our clinic with any questions or concerns at [phone].      Thanks,    [facility]

## 2022-12-09 ENCOUNTER — OFFICE VISIT (OUTPATIENT)
Dept: FAMILY MEDICINE | Facility: OTHER | Age: 87
End: 2022-12-09
Payer: COMMERCIAL

## 2022-12-09 VITALS
RESPIRATION RATE: 12 BRPM | TEMPERATURE: 97.6 F | SYSTOLIC BLOOD PRESSURE: 118 MMHG | HEIGHT: 70 IN | OXYGEN SATURATION: 99 % | DIASTOLIC BLOOD PRESSURE: 72 MMHG | WEIGHT: 189 LBS | BODY MASS INDEX: 27.06 KG/M2 | HEART RATE: 63 BPM

## 2022-12-09 DIAGNOSIS — N18.31 CHRONIC KIDNEY DISEASE, STAGE 3A (H): ICD-10-CM

## 2022-12-09 DIAGNOSIS — E11.9 TYPE 2 DIABETES MELLITUS WITHOUT COMPLICATION, WITHOUT LONG-TERM CURRENT USE OF INSULIN (H): ICD-10-CM

## 2022-12-09 DIAGNOSIS — Z13.220 SCREENING FOR HYPERLIPIDEMIA: ICD-10-CM

## 2022-12-09 DIAGNOSIS — M10.00 IDIOPATHIC GOUT, UNSPECIFIED CHRONICITY, UNSPECIFIED SITE: ICD-10-CM

## 2022-12-09 DIAGNOSIS — K21.00 GASTROESOPHAGEAL REFLUX DISEASE WITH ESOPHAGITIS, UNSPECIFIED WHETHER HEMORRHAGE: Primary | ICD-10-CM

## 2022-12-09 DIAGNOSIS — I25.810 CORONARY ARTERY DISEASE INVOLVING AUTOLOGOUS ARTERY CORONARY BYPASS GRAFT WITHOUT ANGINA PECTORIS: ICD-10-CM

## 2022-12-09 DIAGNOSIS — E78.5 HYPERLIPIDEMIA LDL GOAL <100: ICD-10-CM

## 2022-12-09 LAB
ALBUMIN SERPL-MCNC: 3.8 G/DL (ref 3.4–5)
ALP SERPL-CCNC: 63 U/L (ref 40–150)
ALT SERPL W P-5'-P-CCNC: 17 U/L (ref 0–70)
ANION GAP SERPL CALCULATED.3IONS-SCNC: 2 MMOL/L (ref 3–14)
AST SERPL W P-5'-P-CCNC: 16 U/L (ref 0–45)
BILIRUB SERPL-MCNC: 0.4 MG/DL (ref 0.2–1.3)
BUN SERPL-MCNC: 34 MG/DL (ref 7–30)
CALCIUM SERPL-MCNC: 8.6 MG/DL (ref 8.5–10.1)
CHLORIDE BLD-SCNC: 108 MMOL/L (ref 94–109)
CO2 SERPL-SCNC: 29 MMOL/L (ref 20–32)
CREAT SERPL-MCNC: 1.12 MG/DL (ref 0.66–1.25)
GFR SERPL CREATININE-BSD FRML MDRD: 64 ML/MIN/1.73M2
GLUCOSE BLD-MCNC: 143 MG/DL (ref 70–99)
HBA1C MFR BLD: 7.2 % (ref 0–5.6)
LDLC SERPL CALC-MCNC: 69 MG/DL
POTASSIUM BLD-SCNC: 5 MMOL/L (ref 3.4–5.3)
PROT SERPL-MCNC: 7.2 G/DL (ref 6.8–8.8)
SODIUM SERPL-SCNC: 139 MMOL/L (ref 133–144)
URATE SERPL-MCNC: 3.4 MG/DL (ref 3.5–7.2)

## 2022-12-09 PROCEDURE — 99214 OFFICE O/P EST MOD 30 MIN: CPT | Performed by: PHYSICIAN ASSISTANT

## 2022-12-09 PROCEDURE — 80053 COMPREHEN METABOLIC PANEL: CPT | Performed by: PHYSICIAN ASSISTANT

## 2022-12-09 PROCEDURE — 83721 ASSAY OF BLOOD LIPOPROTEIN: CPT | Performed by: PHYSICIAN ASSISTANT

## 2022-12-09 PROCEDURE — 83036 HEMOGLOBIN GLYCOSYLATED A1C: CPT | Performed by: PHYSICIAN ASSISTANT

## 2022-12-09 PROCEDURE — 99207 PR FOOT EXAM NO CHARGE: CPT | Performed by: PHYSICIAN ASSISTANT

## 2022-12-09 PROCEDURE — 84550 ASSAY OF BLOOD/URIC ACID: CPT | Performed by: PHYSICIAN ASSISTANT

## 2022-12-09 PROCEDURE — 36415 COLL VENOUS BLD VENIPUNCTURE: CPT | Performed by: PHYSICIAN ASSISTANT

## 2022-12-09 RX ORDER — LANCETS
EACH MISCELLANEOUS
Qty: 100 EACH | Refills: 3 | Status: SHIPPED | OUTPATIENT
Start: 2022-12-09

## 2022-12-09 RX ORDER — NITROGLYCERIN 0.4 MG/1
0.4 TABLET SUBLINGUAL PRN
Qty: 25 TABLET | Refills: 0 | Status: SHIPPED | OUTPATIENT
Start: 2022-12-09

## 2022-12-09 ASSESSMENT — PAIN SCALES - GENERAL: PAINLEVEL: NO PAIN (0)

## 2022-12-09 NOTE — LETTER
December 12, 2022      Roque Mckeon  [address]  [place] [address]        Dear ,    We are writing to inform you of your test results.    Your test results fall within the expected range(s) or remain unchanged from previous results.  Please continue with current treatment plan.    Resulted Orders   Uric acid   Result Value Ref Range    Uric Acid 3.4 (L) 3.5 - 7.2 mg/dL   Hemoglobin A1c   Result Value Ref Range    Hemoglobin A1C 7.2 (H) 0.0 - 5.6 %      Comment:      Normal <5.7%   Prediabetes 5.7-6.4%    Diabetes 6.5% or higher     Note: Adopted from ADA consensus guidelines.   LDL cholesterol direct   Result Value Ref Range    LDL Cholesterol Direct 69 <100 mg/dL      Comment:      Age 0-19 years:  Desirable: 0-110 mg/dL   Borderline high: 110-129 mg/dL   High: >= 130 mg/dL    Age 20 years and older:  Desirable: <100mg/dL  Above desirable: 100-129 mg/dL   Borderline high: 130-159 mg/dL   High: 160-189 mg/dL   Very high: >= 190 mg/dL   Comprehensive metabolic panel (BMP + Alb, Alk Phos, ALT, AST, Total. Bili, TP)   Result Value Ref Range    Sodium 139 133 - 144 mmol/L    Potassium 5.0 3.4 - 5.3 mmol/L    Chloride 108 94 - 109 mmol/L    Carbon Dioxide (CO2) 29 20 - 32 mmol/L    Anion Gap 2 (L) 3 - 14 mmol/L    Urea Nitrogen 34 (H) 7 - 30 mg/dL    Creatinine 1.12 0.66 - 1.25 mg/dL    Calcium 8.6 8.5 - 10.1 mg/dL    Glucose 143 (H) 70 - 99 mg/dL    Alkaline Phosphatase 63 40 - 150 U/L    AST 16 0 - 45 U/L    ALT 17 0 - 70 U/L    Protein Total 7.2 6.8 - 8.8 g/dL    Albumin 3.8 3.4 - 5.0 g/dL    Bilirubin Total 0.4 0.2 - 1.3 mg/dL    GFR Estimate 64 >60 mL/min/1.73m2      Comment:      Effective December 21, 2021 eGFRcr in adults is calculated using the 2021 CKD-EPI creatinine equation which includes age and gender (Alin de la fuente al., NEJM, DOI: 10.1056/IMDTlf7362446)       Stable labs at this point time.  No changes to medications recommended.  Follow-up in 6 months is advised.     If you have any questions  or concerns, please call the clinic at the number listed above.       Sincerely,      Adam Marc PA-C

## 2022-12-09 NOTE — PROGRESS NOTES
Assessment & Plan     Type 2 diabetes mellitus without complication, without long-term current use of insulin (H)  Patient presents for labs and recheck of meds.  Follow-up in 3 months is advised.  Labs pending at time of dictation.  - Microlet Lancets MISC; USE TO TEST BLOOD SUGARS ONCE DAILY  - Hemoglobin A1c; Future  - nitroGLYcerin (NITROSTAT) 0.4 MG sublingual tablet; Place 1 tablet (0.4 mg) under the tongue as needed for chest pain  - LDL cholesterol direct; Future  - Comprehensive metabolic panel (BMP + Alb, Alk Phos, ALT, AST, Total. Bili, TP); Future  - FOOT EXAM    Coronary artery disease involving autologous artery coronary bypass graft without angina pectoris  Doing well no new concerns.  Refilled medications follow-up labs being done today.  Follow-up 6 months  - Hemoglobin A1c; Future  - nitroGLYcerin (NITROSTAT) 0.4 MG sublingual tablet; Place 1 tablet (0.4 mg) under the tongue as needed for chest pain  - LDL cholesterol direct; Future  - Comprehensive metabolic panel (BMP + Alb, Alk Phos, ALT, AST, Total. Bili, TP); Future    Chronic kidney disease, stage 3a (H)  Repeat labs to be done today.  Follow-up based on results.  Recheck in 6 months.  - Hemoglobin A1c; Future  - nitroGLYcerin (NITROSTAT) 0.4 MG sublingual tablet; Place 1 tablet (0.4 mg) under the tongue as needed for chest pain  - LDL cholesterol direct; Future  - Comprehensive metabolic panel (BMP + Alb, Alk Phos, ALT, AST, Total. Bili, TP); Future    Gastroesophageal reflux disease with esophagitis, unspecified whether hemorrhage  Doing well no new concerns denies any need for refills or current medications.    Hyperlipidemia LDL goal <100  See above  - Hemoglobin A1c; Future  - nitroGLYcerin (NITROSTAT) 0.4 MG sublingual tablet; Place 1 tablet (0.4 mg) under the tongue as needed for chest pain  - LDL cholesterol direct; Future  - Comprehensive metabolic panel (BMP + Alb, Alk Phos, ALT, AST, Total. Bili, TP); Future    Idiopathic gout,  "unspecified chronicity, unspecified site  Repeating labs at this point time.  Follow-up based on results.  Medication seems to be working well.  - Uric acid; Future     BMI:   Estimated body mass index is 27.06 kg/m  as calculated from the following:    Height as of this encounter: 1.78 m (5' 10.08\").    Weight as of this encounter: 85.7 kg (189 lb).   Weight management plan: Discussed healthy diet and exercise guidelines    Work on weight loss  Regular exercise  No follow-ups on file.    Adam Carvajal PA-C  [facility] MONICA Nevarez is a 87 year old, presenting for the following health issues:  Recheck Medication      History of Present Illness       Diabetes:   He presents for follow up of diabetes.  He is checking home blood glucose one time daily. He checks blood glucose before meals.  Blood glucose is never over 200 and never under 70. When his blood glucose is low, the patient is asymptomatic for confusion, blurred vision, lethargy and reports not feeling dizzy, shaky, or weak.  He has no concerns regarding his diabetes at this time.  He is not experiencing numbness or burning in feet, excessive thirst, blurry vision, weight changes or redness, sores or blisters on feet.         He eats 2-3 servings of fruits and vegetables daily.He consumes 0 sweetened beverage(s) daily.He exercises with enough effort to increase his heart rate 10 to 19 minutes per day.  He exercises with enough effort to increase his heart rate 3 or less days per week.   He is taking medications regularly.    Today's PHQ-9         PHQ-9 Total Score: 1    PHQ-9 Q9 Thoughts of better off dead/self-harm past 2 weeks :   Not at all    How difficult have these problems made it for you to do your work, take care of things at home, or get along with other people: Not difficult at all     Review of Systems   Constitutional, HEENT, cardiovascular, pulmonary, GI, , musculoskeletal, neuro, skin, endocrine and psych " "systems are negative, except as otherwise noted.      Objective    /72   Pulse 63   Temp 97.6  F (36.4  C) (Temporal)   Resp 12   Ht 1.78 m (5' 10.08\")   Wt 85.7 kg (189 lb)   SpO2 99%   BMI 27.06 kg/m    Body mass index is 27.06 kg/m .  Physical Exam   GENERAL: healthy, alert and no distress  NECK: no adenopathy, no asymmetry, masses, or scars and thyroid normal to palpation  RESP: lungs clear to auscultation - no rales, rhonchi or wheezes  CV: regular rate and rhythm, normal S1 S2, no S3 or S4, no murmur, click or rub, no peripheral edema and peripheral pulses strong  ABDOMEN: soft, nontender, no hepatosplenomegaly, no masses and bowel sounds normal  MS: no gross musculoskeletal defects noted, no edema  SKIN: no suspicious lesions or rashes to exposed visible skin today.  Multiple aging spots present.  NEURO: Normal strength and tone, mentation intact and speech normal  PSYCH: mentation appears normal, affect normal/bright  Diabetic foot exam: normal DP and PT pulses, no trophic changes or ulcerative lesions and normal sensory exam    No results found for this or any previous visit (from the past 24 hour(s)).             "

## 2023-01-24 DIAGNOSIS — K21.00 GASTROESOPHAGEAL REFLUX DISEASE WITH ESOPHAGITIS, UNSPECIFIED WHETHER HEMORRHAGE: ICD-10-CM

## 2023-01-24 RX ORDER — FAMOTIDINE 20 MG/1
TABLET, FILM COATED ORAL
Qty: 90 TABLET | Refills: 3 | Status: SHIPPED | OUTPATIENT
Start: 2023-01-24 | End: 2024-01-31

## 2023-02-06 DIAGNOSIS — F41.9 ANXIETY AND DEPRESSION: ICD-10-CM

## 2023-02-06 DIAGNOSIS — F32.A ANXIETY AND DEPRESSION: ICD-10-CM

## 2023-02-06 RX ORDER — PAROXETINE 30 MG/1
TABLET, FILM COATED ORAL
Qty: 90 TABLET | Refills: 3 | Status: SHIPPED | OUTPATIENT
Start: 2023-02-06 | End: 2023-03-24

## 2023-03-21 ENCOUNTER — TELEPHONE (OUTPATIENT)
Dept: FAMILY MEDICINE | Facility: OTHER | Age: 88
End: 2023-03-21
Payer: COMMERCIAL

## 2023-03-21 NOTE — TELEPHONE ENCOUNTER
Call from patients wife. Patient has been wanting to donate blood. He had gone to do this yesterday and was told he could not due to low iron.   Patient would like to start an iron supplement to donate. Wondering if there are any interactions with iron and the medications he is currently taking, and how much iron he should take.     Christen GRAYN, RN  [facility]

## 2023-03-23 NOTE — TELEPHONE ENCOUNTER
I suspect that he was not allowed to donate because he was low on hemoglobin and not necessarily on iron.  Would encourage a follow-up appointment here in the clinic to double check a complete blood cell count, total iron binding capacity ferritin and transferrin level.  Please help him schedule.  He may have any green slot available that works for him.  Electronically signed:    Adam Carvajal PA-C

## 2023-03-24 ENCOUNTER — OFFICE VISIT (OUTPATIENT)
Dept: FAMILY MEDICINE | Facility: OTHER | Age: 88
End: 2023-03-24
Payer: COMMERCIAL

## 2023-03-24 VITALS
SYSTOLIC BLOOD PRESSURE: 110 MMHG | WEIGHT: 195 LBS | OXYGEN SATURATION: 97 % | DIASTOLIC BLOOD PRESSURE: 60 MMHG | TEMPERATURE: 96.9 F | RESPIRATION RATE: 24 BRPM | HEART RATE: 68 BPM | BODY MASS INDEX: 27.92 KG/M2 | HEIGHT: 70 IN

## 2023-03-24 DIAGNOSIS — E11.9 TYPE 2 DIABETES MELLITUS WITHOUT COMPLICATION, WITHOUT LONG-TERM CURRENT USE OF INSULIN (H): ICD-10-CM

## 2023-03-24 DIAGNOSIS — R57.0 CARDIOGENIC SHOCK (H): ICD-10-CM

## 2023-03-24 DIAGNOSIS — Z00.00 ROUTINE HISTORY AND PHYSICAL EXAMINATION OF ADULT: Primary | ICD-10-CM

## 2023-03-24 DIAGNOSIS — Z00.00 MEDICARE ANNUAL WELLNESS VISIT, SUBSEQUENT: ICD-10-CM

## 2023-03-24 DIAGNOSIS — I25.810 CORONARY ARTERY DISEASE INVOLVING AUTOLOGOUS ARTERY CORONARY BYPASS GRAFT WITHOUT ANGINA PECTORIS: ICD-10-CM

## 2023-03-24 DIAGNOSIS — E78.5 HYPERLIPIDEMIA LDL GOAL <100: ICD-10-CM

## 2023-03-24 DIAGNOSIS — F32.5 MAJOR DEPRESSIVE DISORDER IN FULL REMISSION, UNSPECIFIED WHETHER RECURRENT (H): ICD-10-CM

## 2023-03-24 DIAGNOSIS — F32.A ANXIETY AND DEPRESSION: ICD-10-CM

## 2023-03-24 DIAGNOSIS — N18.31 CHRONIC KIDNEY DISEASE, STAGE 3A (H): ICD-10-CM

## 2023-03-24 DIAGNOSIS — D64.9 ANEMIA, UNSPECIFIED TYPE: ICD-10-CM

## 2023-03-24 DIAGNOSIS — F41.9 ANXIETY AND DEPRESSION: ICD-10-CM

## 2023-03-24 LAB
ALBUMIN SERPL BCG-MCNC: 4.3 G/DL (ref 3.5–5.2)
ALP SERPL-CCNC: 70 U/L (ref 40–129)
ALT SERPL W P-5'-P-CCNC: 17 U/L (ref 10–50)
ANION GAP SERPL CALCULATED.3IONS-SCNC: 11 MMOL/L (ref 7–15)
AST SERPL W P-5'-P-CCNC: 25 U/L (ref 10–50)
BILIRUB SERPL-MCNC: 0.3 MG/DL
BUN SERPL-MCNC: 31.9 MG/DL (ref 8–23)
CALCIUM SERPL-MCNC: 9.4 MG/DL (ref 8.8–10.2)
CHLORIDE SERPL-SCNC: 102 MMOL/L (ref 98–107)
CREAT SERPL-MCNC: 1.17 MG/DL (ref 0.67–1.17)
DEPRECATED HCO3 PLAS-SCNC: 24 MMOL/L (ref 22–29)
ERYTHROCYTE [DISTWIDTH] IN BLOOD BY AUTOMATED COUNT: 15.1 % (ref 10–15)
FERRITIN SERPL-MCNC: 17 NG/ML (ref 31–409)
GFR SERPL CREATININE-BSD FRML MDRD: 61 ML/MIN/1.73M2
GLUCOSE SERPL-MCNC: 172 MG/DL (ref 70–99)
HBA1C MFR BLD: 7.5 % (ref 0–5.6)
HCT VFR BLD AUTO: 36.3 % (ref 40–53)
HGB BLD-MCNC: 11.3 G/DL (ref 13.3–17.7)
IRON BINDING CAPACITY (ROCHE): 448 UG/DL (ref 240–430)
IRON SATN MFR SERPL: 12 % (ref 15–46)
IRON SERPL-MCNC: 53 UG/DL (ref 61–157)
MCH RBC QN AUTO: 31.3 PG (ref 26.5–33)
MCHC RBC AUTO-ENTMCNC: 31.1 G/DL (ref 31.5–36.5)
MCV RBC AUTO: 101 FL (ref 78–100)
PLATELET # BLD AUTO: 243 10E3/UL (ref 150–450)
POTASSIUM SERPL-SCNC: 5.1 MMOL/L (ref 3.4–5.3)
PROT SERPL-MCNC: 7.3 G/DL (ref 6.4–8.3)
RBC # BLD AUTO: 3.61 10E6/UL (ref 4.4–5.9)
SODIUM SERPL-SCNC: 137 MMOL/L (ref 136–145)
WBC # BLD AUTO: 4.7 10E3/UL (ref 4–11)

## 2023-03-24 PROCEDURE — 83540 ASSAY OF IRON: CPT | Performed by: PHYSICIAN ASSISTANT

## 2023-03-24 PROCEDURE — 83036 HEMOGLOBIN GLYCOSYLATED A1C: CPT | Performed by: PHYSICIAN ASSISTANT

## 2023-03-24 PROCEDURE — 82728 ASSAY OF FERRITIN: CPT | Performed by: PHYSICIAN ASSISTANT

## 2023-03-24 PROCEDURE — G0439 PPPS, SUBSEQ VISIT: HCPCS | Performed by: PHYSICIAN ASSISTANT

## 2023-03-24 PROCEDURE — 85027 COMPLETE CBC AUTOMATED: CPT | Performed by: PHYSICIAN ASSISTANT

## 2023-03-24 PROCEDURE — 84466 ASSAY OF TRANSFERRIN: CPT | Performed by: PHYSICIAN ASSISTANT

## 2023-03-24 PROCEDURE — 80053 COMPREHEN METABOLIC PANEL: CPT | Performed by: PHYSICIAN ASSISTANT

## 2023-03-24 PROCEDURE — 83721 ASSAY OF BLOOD LIPOPROTEIN: CPT | Performed by: PHYSICIAN ASSISTANT

## 2023-03-24 PROCEDURE — 82306 VITAMIN D 25 HYDROXY: CPT | Performed by: PHYSICIAN ASSISTANT

## 2023-03-24 PROCEDURE — 36415 COLL VENOUS BLD VENIPUNCTURE: CPT | Performed by: PHYSICIAN ASSISTANT

## 2023-03-24 PROCEDURE — 99214 OFFICE O/P EST MOD 30 MIN: CPT | Mod: 25 | Performed by: PHYSICIAN ASSISTANT

## 2023-03-24 RX ORDER — PAROXETINE 30 MG/1
30 TABLET, FILM COATED ORAL DAILY
Qty: 90 TABLET | Refills: 3 | Status: SHIPPED | OUTPATIENT
Start: 2023-03-24 | End: 2024-04-29

## 2023-03-24 ASSESSMENT — ANXIETY QUESTIONNAIRES
8. IF YOU CHECKED OFF ANY PROBLEMS, HOW DIFFICULT HAVE THESE MADE IT FOR YOU TO DO YOUR WORK, TAKE CARE OF THINGS AT HOME, OR GET ALONG WITH OTHER PEOPLE?: NOT DIFFICULT AT ALL
2. NOT BEING ABLE TO STOP OR CONTROL WORRYING: NOT AT ALL
5. BEING SO RESTLESS THAT IT IS HARD TO SIT STILL: NOT AT ALL
IF YOU CHECKED OFF ANY PROBLEMS ON THIS QUESTIONNAIRE, HOW DIFFICULT HAVE THESE PROBLEMS MADE IT FOR YOU TO DO YOUR WORK, TAKE CARE OF THINGS AT HOME, OR GET ALONG WITH OTHER PEOPLE: NOT DIFFICULT AT ALL
3. WORRYING TOO MUCH ABOUT DIFFERENT THINGS: NOT AT ALL
6. BECOMING EASILY ANNOYED OR IRRITABLE: SEVERAL DAYS
7. FEELING AFRAID AS IF SOMETHING AWFUL MIGHT HAPPEN: NOT AT ALL
4. TROUBLE RELAXING: NOT AT ALL
GAD7 TOTAL SCORE: 1
7. FEELING AFRAID AS IF SOMETHING AWFUL MIGHT HAPPEN: NOT AT ALL
GAD7 TOTAL SCORE: 1
1. FEELING NERVOUS, ANXIOUS, OR ON EDGE: NOT AT ALL
GAD7 TOTAL SCORE: 1

## 2023-03-24 ASSESSMENT — PAIN SCALES - GENERAL: PAINLEVEL: NO PAIN (0)

## 2023-03-24 ASSESSMENT — PATIENT HEALTH QUESTIONNAIRE - PHQ9
SUM OF ALL RESPONSES TO PHQ QUESTIONS 1-9: 3
SUM OF ALL RESPONSES TO PHQ QUESTIONS 1-9: 3
10. IF YOU CHECKED OFF ANY PROBLEMS, HOW DIFFICULT HAVE THESE PROBLEMS MADE IT FOR YOU TO DO YOUR WORK, TAKE CARE OF THINGS AT HOME, OR GET ALONG WITH OTHER PEOPLE: NOT DIFFICULT AT ALL

## 2023-03-24 ASSESSMENT — ENCOUNTER SYMPTOMS
SORE THROAT: 0
ARTHRALGIAS: 0
DIZZINESS: 0
HEMATOCHEZIA: 0
FREQUENCY: 1
ABDOMINAL PAIN: 0
HEMATURIA: 0
PARESTHESIAS: 0
WEAKNESS: 1
COUGH: 0
EYE PAIN: 0
HEARTBURN: 0
NAUSEA: 0
DIARRHEA: 1
MYALGIAS: 1
JOINT SWELLING: 0
DYSURIA: 0
SHORTNESS OF BREATH: 0
CHILLS: 0
FEVER: 0
CONSTIPATION: 0
HEADACHES: 0
PALPITATIONS: 0
NERVOUS/ANXIOUS: 0

## 2023-03-24 ASSESSMENT — ACTIVITIES OF DAILY LIVING (ADL)
CURRENT_FUNCTION: PREPARING MEALS REQUIRES ASSISTANCE
CURRENT_FUNCTION: TELEPHONE REQUIRES ASSISTANCE

## 2023-03-24 NOTE — PROGRESS NOTES
"SUBJECTIVE:   Roque is a 87 year old who presents for Preventive Visit.  No flowsheet data found.Patient has been advised of split billing requirements and indicates understanding: Yes  Are you in the first 12 months of your Medicare coverage?  No    Healthy Habits:     In general, how would you rate your overall health?  Good    Frequency of exercise:  1 day/week    Duration of exercise:  15-30 minutes    Do you usually eat at least 4 servings of fruit and vegetables a day, include whole grains    & fiber and avoid regularly eating high fat or \"junk\" foods?  Yes    Taking medications regularly:  Yes    Medication side effects:  None    Ability to successfully perform activities of daily living:  Telephone requires assistance and preparing meals requires assistance    Home Safety:  No safety concerns identified    Hearing Impairment:  Difficulty following a conversation in a noisy restaurant or crowded room, need to ask people to speak up or repeat themselves, difficulty understanding soft or whispered speech and difficulty understanding speech on the telephone    In the past 6 months, have you been bothered by leaking of urine?  No    In general, how would you rate your overall mental or emotional health?  Good      PHQ-2 Total Score: 0    Additional concerns today:  Yes  went to donate blood and they told him his iron was low, wondering if he could take an iron tablet    Have you ever done Advance Care Planning? (For example, a Health Directive, POLST, or a discussion with a medical provider or your loved ones about your wishes): No, advance care planning information given to patient to review.  Patient plans to discuss their wishes with loved ones or provider.         Fall risk  Fallen 2 or more times in the past year?: No  Any fall with injury in the past year?: No    Cognitive Screening   1) Repeat 3 items (Leader, Season, Table)    2) Clock draw: NORMAL  3) 3 item recall: Recalls 2 objects   Results: NORMAL " clock, 1-2 items recalled: COGNITIVE IMPAIRMENT LESS LIKELY    Mini-CogTM Copyright REINA Payne. Licensed by the author for use in [facility]; reprinted with permission (melina@[facility]). All rights reserved.      Do you have sleep apnea, excessive snoring or daytime drowsiness?: no    Reviewed and updated as needed this visit by clinical staff   Tobacco  Allergies  Meds              Reviewed and updated as needed this visit by Provider                 Social History     Tobacco Use     Smoking status: Never     Smokeless tobacco: Never     Tobacco comments:     never   Substance Use Topics     Alcohol use: No     Alcohol/week: 0.0 standard drinks         Alcohol Use 3/24/2023   Prescreen: >3 drinks/day or >7 drinks/week? Not Applicable     Do you have a current opioid prescription? No  Do you use any other controlled substances or medications that are not prescribed by a provider? None    Current providers sharing in care for this patient include:   Patient Care Team:  Adam Marc PA-C as PCP - General (Physician Assistant)  Adam Marc PA-C as Assigned PCP    The following health maintenance items are reviewed in Epic and correct as of today:  Health Maintenance   Topic Date Due     ZOSTER IMMUNIZATION (2 of 3) 08/24/2021     MEDICARE ANNUAL WELLNESS VISIT  12/18/2021     COVID-19 Vaccine (4 - Booster for Moderna series) 02/24/2022     LIPID  11/15/2022     EYE EXAM  01/20/2023     HEMOGLOBIN  11/19/2022     A1C  06/09/2023     MICROALBUMIN  08/11/2023     PHQ-9  09/24/2023     BMP  12/09/2023     CMP  12/09/2023     DIABETIC FOOT EXAM  12/09/2023     ANNUAL REVIEW OF HM ORDERS  12/09/2023     FALL RISK ASSESSMENT  03/24/2024     ADVANCE CARE PLANNING  12/18/2025     DTAP/TDAP/TD IMMUNIZATION (3 - Td or Tdap) 01/04/2028     DEPRESSION ACTION PLAN  Completed     Pneumococcal Vaccine: 65+ Years  Completed     URINALYSIS  Completed     IPV IMMUNIZATION  Aged Out     MENINGITIS IMMUNIZATION  Aged Out      INFLUENZA VACCINE  Discontinued     Lab work is in process  Labs reviewed in EPIC  BP Readings from Last 3 Encounters:   03/24/23 110/60   12/09/22 118/72   04/08/22 122/60    Wt Readings from Last 3 Encounters:   03/24/23 88.5 kg (195 lb)   12/09/22 85.7 kg (189 lb)   04/08/22 79.8 kg (176 lb)                  Patient Active Problem List   Diagnosis     Other left bundle branch block     HYPERLIPIDEMIA LDL GOAL <100     Major depression in complete remission (H)     Advanced directives, counseling/discussion     Screening for prostate cancer     Gout     Type 2 diabetes mellitus without complication, without long-term current use of insulin (H)     Tick-borne disease     Adjustment disorder with mixed anxiety and depressed mood     Anxiety     Other iron deficiency anemia     Carotid stenosis, right     Hyperkalemia     Chronic kidney disease, stage 3a (H)     Personal history of COVID-19 - September 2021     DELUCA (dyspnea on exertion)     Elevated brain natriuretic peptide (BNP) level     LBBB (left bundle branch block)     NSTEMI (non-ST elevated myocardial infarction) (H)     Cardiogenic shock (H)     Coronary artery disease involving autologous artery coronary bypass graft without angina pectoris     Gastroesophageal reflux disease with esophagitis, unspecified whether hemorrhage     Major depression in complete remission (H)     Past Surgical History:   Procedure Laterality Date     cabg Left     3 vessel     COLONOSCOPY  08/03/2011    Procedure:COMBINED COLONOSCOPY, SINGLE BIOPSY/POLYPECTOMY BY BIOPSY; Surgeon:GRUPO STONER; Location: GI     COLONOSCOPY  01/01/2016    [facility] FRAGMENTING OF KIDNEY STONE  03/05/2003    Left extracorporal shock wave lithotripsy, [facility]     PHACOEMULSIFICATION WITH STANDARD INTRAOCULAR LENS IMPLANT  01/20/2011    PHACOEMULSIFICATION WITH STANDARD INTRAOCULAR LENS IMPLANT performed by OSMAN CHO at  OR     [facility] COLONOSCOPY W/WO BRUSH/WASH  02/27/2001     Normal.       Social History     Tobacco Use     Smoking status: Never     Smokeless tobacco: Never     Tobacco comments:     never   Substance Use Topics     Alcohol use: No     Alcohol/week: 0.0 standard drinks     Family History   Problem Relation Age of Onset     Cardiovascular Mother      Arthritis Mother      Heart Disease Mother      Cerebrovascular Disease Mother      Cardiovascular Father      Heart Disease Father      Depression Son      Depression Sister      Genetic Disorder Son      Osteoporosis Sister      Psychotic Disorder Son      Breast Cancer Sister      Alcohol/Drug No family hx of      Circulatory No family hx of      Allergies No family hx of      Alzheimer Disease No family hx of      Blood Disease No family hx of      Cancer No family hx of      Diabetes No family hx of      Gastrointestinal Disease No family hx of      Genitourinary Problems No family hx of      Lipids No family hx of      Neurologic Disorder No family hx of      Thyroid Disease No family hx of          Current Outpatient Medications   Medication Sig Dispense Refill     allopurinol (ZYLOPRIM) 300 MG tablet Take 1 tablet by mouth daily. 90 tablet 3     ASPIRIN 81 MG OR TABS 1 tab po QD (Once per day) 0 0     atorvastatin (LIPITOR) 80 MG tablet TAKE ONE TABLET BY MOUTH EVERY DAY FOR CHOLESTEROL       clopidogrel (PLAVIX) 75 MG tablet TAKE ONE TABLET BY MOUTH ONCE DAILY 90 tablet 1     famotidine (PEPCID) 20 MG tablet TAKE ONE TABLET BY MOUTH TWICE DAILY 90 tablet 3     furosemide (LASIX) 20 MG tablet Take 1 tablet (20 mg) by mouth 2 times daily 60 tablet 0     glipiZIDE (GLUCOTROL) 10 MG tablet TAKE ONE TABLET BY MOUTH TWICE A DAY FOR DIABETES *TAKE 30 MINUTES BEFORE A MEAL*       metFORMIN (GLUCOPHAGE) 1000 MG tablet Take 1,000 mg by mouth 2 times daily (with meals)  90 tablet 3     PARoxetine (PAXIL) 30 MG tablet Take 1 tablet (30 mg) by mouth daily 90 tablet 3     pioglitazone (ACTOS) 30 MG tablet Take 1 tablet (30 mg)  by mouth daily 90 tablet 1     ACE/ARB/ARNI NOT PRESCRIBED, INTENTIONAL, Please choose reason not prescribed, below       acetaminophen (TYLENOL) 500 MG tablet Take 1,000 mg by mouth daily       blood glucose (ONETOUCH ULTRA) test strip USE TO TEST BLOOD SUGAR THREE TIMES A  each 3     blood glucose monitoring (NO BRAND SPECIFIED) meter device kit Use to test blood sugar 1 times daily or as directed. 1 kit 0     Blood Glucose Monitoring Suppl MISC 1 strip daily Or as directed. 100 each 0     glucose (BD GLUCOSE) 4 g chewable tablet CHEW TWO TABLETS BY MOUTH DAILY FOR LOW BLOOD SUGAR FOR LOW BLOOD SUGARS       Microlet Lancets MISC USE TO TEST BLOOD SUGARS ONCE DAILY 100 each 3     nitroGLYcerin (NITROSTAT) 0.4 MG sublingual tablet Place 1 tablet (0.4 mg) under the tongue as needed for chest pain 25 tablet 0     nortriptyline (PAMELOR) 25 MG capsule Take 1-2 capsules (25-50 mg) by mouth At Bedtime 60 capsule 3     simvastatin (ZOCOR) 80 MG tablet Take 40 mg by mouth daily 90 tablet 3     Allergies   Allergen Reactions     No Known Drug Allergies      Recent Labs   Lab Test 12/09/22  1510 08/11/22  1449 04/08/22  1514 11/19/21  1211 11/15/21  1207 11/15/21  1207 06/25/21  1557 04/08/21  0917 07/30/20  1027 06/22/20  1031 01/28/20  1516 10/15/19  0950 05/30/19  0000 07/31/18  1526 01/04/18  0953   A1C 7.2* 7.1* 7.3*  --    < > 7.4* 7.1*  --    < >  --  7.8*   < > 8.5*   < > 7.7*   LDL 69  --  54  --   --  22 60  --    < > 40  --   --  81  --   --    HDL  --   --   --   --   --  56  --   --   --  42  --   --  49  --   --    TRIG  --   --   --   --   --  127  --   --   --  292*  --   --  152*  --   --    ALT 17  --  27 32   < > 29 27 27   < > 24 23   < > 20  --  31   CR 1.12  --  0.97 1.21   < > 1.16 1.47* 1.14   < > 1.05 1.08   < > 1.1  --  0.87   GFRESTIMATED 64  --  77 55*   < > 58* 43* 59*   < > 66 64   < > >60   < > 84   GFRESTBLACK  --   --   --   --   --   --  50* 68   < > 76 74   < >  --    < > >90  "  POTASSIUM 5.0  --  5.6* 4.9   < > 5.1 5.4* 5.0   < > 5.0 5.0   < > 4.8  --  4.5   TSH  --   --   --   --   --   --   --   --   --   --  3.79  --   --   --  2.65    < > = values in this interval not displayed.        Review of Systems   Constitutional: Negative for chills and fever.   HENT: Positive for hearing loss. Negative for congestion, ear pain and sore throat.    Eyes: Negative for pain and visual disturbance.   Respiratory: Negative for cough and shortness of breath.    Cardiovascular: Negative for chest pain, palpitations and peripheral edema.   Gastrointestinal: Positive for diarrhea. Negative for abdominal pain, constipation, heartburn, hematochezia and nausea.   Genitourinary: Positive for frequency and impotence. Negative for dysuria, genital sores, hematuria, penile discharge and urgency.   Musculoskeletal: Positive for myalgias. Negative for arthralgias and joint swelling.   Skin: Negative for rash.   Neurological: Positive for weakness. Negative for dizziness, headaches and paresthesias.   Psychiatric/Behavioral: Negative for mood changes. The patient is not nervous/anxious.        OBJECTIVE:   /60 (BP Location: Right arm, Patient Position: Sitting, Cuff Size: Adult Large)   Pulse 68   Temp 96.9  F (36.1  C) (Temporal)   Resp 24   Ht 1.782 m (5' 10.16\")   Wt 88.5 kg (195 lb)   SpO2 97%   BMI 27.85 kg/m   Estimated body mass index is 27.85 kg/m  as calculated from the following:    Height as of this encounter: 1.782 m (5' 10.16\").    Weight as of this encounter: 88.5 kg (195 lb).  Physical Exam  GENERAL: healthy, alert and no distress  EYES: Eyes grossly normal to inspection, PERRL and conjunctivae and sclerae normal  HENT: ear canals and TM's normal, nose and mouth without ulcers or lesions  NECK: no adenopathy, no asymmetry, masses, or scars and thyroid normal to palpation  RESP: lungs clear to auscultation - no rales, rhonchi or wheezes  CV: regular rate and rhythm, normal S1 S2, no " S3 or S4, no murmur, click or rub, no peripheral edema and peripheral pulses strong  ABDOMEN: soft, nontender, no hepatosplenomegaly, no masses and bowel sounds normal  MS: no gross musculoskeletal defects noted, no edema  SKIN: no suspicious lesions or rashes  NEURO: Normal strength and tone, mentation intact and speech normal  PSYCH: mentation appears normal, affect normal/bright  Diabetic foot exam: normal DP and PT pulses, no trophic changes or ulcerative lesions and normal sensory exam    Diagnostic Test Results:  Labs reviewed in Epic  No results found for any visits on 03/24/23.    ASSESSMENT / PLAN:   Diagnoses and all orders for this visit:    Routine history and physical examination of adult    Medicare annual wellness visit, subsequent    Coronary artery disease involving autologous artery coronary bypass graft without angina pectoris  -     Vitamin D Deficiency; Future  -     Vitamin D Deficiency    Type 2 diabetes mellitus without complication, without long-term current use of insulin (H)  -     Adult Eye  Referral; Future  -     Comprehensive metabolic panel (BMP + Alb, Alk Phos, ALT, AST, Total. Bili, TP); Future  -     LDL cholesterol direct; Future  -     Vitamin D Deficiency; Future  -     Hemoglobin A1c; Future  -     Hemoglobin A1c  -     Vitamin D Deficiency  -     LDL cholesterol direct  -     Comprehensive metabolic panel (BMP + Alb, Alk Phos, ALT, AST, Total. Bili, TP)    Chronic kidney disease, stage 3a (H)  -     Hemoglobin; Future  -     Vitamin D Deficiency; Future  -     Vitamin D Deficiency  -     Cancel: Hemoglobin    Major depressive disorder in full remission, unspecified whether recurrent (H)    Cardiogenic shock (H)    Hyperlipidemia LDL goal <100  -     Comprehensive metabolic panel (BMP + Alb, Alk Phos, ALT, AST, Total. Bili, TP); Future  -     LDL cholesterol direct; Future  -     LDL cholesterol direct  -     Comprehensive metabolic panel (BMP + Alb, Alk Phos, ALT,  AST, Total. Bili, TP)    Anemia, unspecified type  -     CBC with platelets; Future  -     Iron and iron binding capacity; Future  -     Transferrin; Future  -     Ferritin; Future  -     Fecal colorectal cancer screen (FIT); Future  -     Fecal colorectal cancer screen (FIT)  -     Ferritin  -     Transferrin  -     Iron and iron binding capacity  -     CBC with platelets    Anxiety and depression  -     PARoxetine (PAXIL) 30 MG tablet; Take 1 tablet (30 mg) by mouth daily        87-year-old male here for routine physical/Medicare wellness visit.  Repeat in 1 year.      History of coronary artery disease with no new concerns.  [facility]  [facility] due to [facility] problems.  We will have him follow-up with his cardiologist on a regular basis.    He has a history of type 2 diabetes that has been in good control without any insulin.  Continues to take his medications by report.  Checking related labs today.  Adjust medications as needed.  Follow-up in 6 months is encouraged.    History of chronic kidney disease is noted.  Labs pending.  Follow-up based on results.    History of depression under excellent control right now no new concerns with respect to this.  Follow-up as needed.    LDL goal less than 100 noted.  Labs pending.    One of his other concerns today is for a recent report of anemia when he tried to give blood within the past couple weeks.  He does report some darker stools than normal but has not had a type of pain or discomfort.  Advised that we should be taking a look at his overall labs and evaluate further for the potential of colonic bleeding.  May need to have colonoscopy.  Labs pending at time of dictation though anemia is noted with a hemoglobin 11.3..  Follow-up based on results.    Patient has been advised of split billing requirements and indicates understanding: Yes      COUNSELING:  Reviewed preventive health counseling, as  reflected in patient instructions       Consider AAA screening for ages 65-75 and smoking history       Regular exercise       Healthy diet/nutrition       Vision screening       Hearing screening       Dental care       Bladder control       Fall risk prevention        He reports that he has never smoked. He has never used smokeless tobacco.      Appropriate preventive services were discussed with this patient, including applicable screening as appropriate for cardiovascular disease, diabetes, osteopenia/osteoporosis, and glaucoma.  As appropriate for age/gender, discussed screening for colorectal cancer, prostate cancer, breast cancer, and cervical cancer. Checklist reviewing preventive services available has been given to the patient.    Reviewed patients plan of care and provided an AVS. The Basic Care Plan (routine screening as documented in Health Maintenance) for Roque meets the Care Plan requirement. This Care Plan has been established and reviewed with the Patient.      Adam Carvajal PA-C  [facility]    Identified Health Risks:  Answers for HPI/ROS submitted by the patient on 3/24/2023  If you checked off any problems, how difficult have these problems made it for you to do your work, take care of things at home, or get along with other people?: Not difficult at all  PHQ9 TOTAL SCORE: 3  CHERI 7 TOTAL SCORE: 1

## 2023-03-24 NOTE — LETTER
April 14, 2023      Roque Mckeon  [address] [facility] [address]        Dear ,    We are writing to inform you of your test results.    See comment below.     Resulted Orders   Hemoglobin A1c   Result Value Ref Range    Hemoglobin A1C 7.5 (H) 0.0 - 5.6 %      Comment:      Normal <5.7%   Prediabetes 5.7-6.4%    Diabetes 6.5% or higher     Note: Adopted from ADA consensus guidelines.   Vitamin D Deficiency   Result Value Ref Range    Vitamin D, Total (25-Hydroxy) 23 20 - 75 ug/L    Narrative    Season, race, dietary intake, and treatment affect the concentration of 25-hydroxy-Vitamin D. Values may decrease during winter months and increase during summer months. Values 20-29 ug/L may indicate Vitamin D insufficiency and values <20 ug/L may indicate Vitamin D deficiency.    Vitamin D determination is routinely performed by an immunoassay specific for 25 hydroxyvitamin D3.  If an individual is on vitamin D2(ergocalciferol) supplementation, please specify 25 OH vitamin D2 and D3 level determination by LCMSMS test VITD23.     Fecal colorectal cancer screen (FIT)   Result Value Ref Range    Occult Blood Screen FIT Negative Negative   Ferritin   Result Value Ref Range    Ferritin 17 (L) 31 - 409 ng/mL   Transferrin   Result Value Ref Range    Transferrin 390.0 (H) 200.0 - 360.0 mg/dL   Iron and iron binding capacity   Result Value Ref Range    Iron 53 (L) 61 - 157 ug/dL    Iron Binding Capacity 448 (H) 240 - 430 ug/dL    Iron Sat Index 12 (L) 15 - 46 %   LDL cholesterol direct   Result Value Ref Range    LDL Cholesterol Direct 59 <100 mg/dL      Comment:      Age 2-19 years:  Desirable: 0-110 mg/dL   Borderline high: 110-129 mg/dL   High: >= 130 mg/dL    Age 20 years and older:  Desirable: <100mg/dL  Above desirable: 100-129 mg/dL   Borderline high: 130-159 mg/dL   High: 160-189 mg/dL   Very high: >= 190 mg/dL   Comprehensive metabolic panel (BMP + Alb, Alk Phos, ALT, AST, Total. Bili, TP)   Result Value Ref  Range    Sodium 137 136 - 145 mmol/L    Potassium 5.1 3.4 - 5.3 mmol/L    Chloride 102 98 - 107 mmol/L    Carbon Dioxide (CO2) 24 22 - 29 mmol/L    Anion Gap 11 7 - 15 mmol/L    Urea Nitrogen 31.9 (H) 8.0 - 23.0 mg/dL    Creatinine 1.17 0.67 - 1.17 mg/dL    Calcium 9.4 8.8 - 10.2 mg/dL    Glucose 172 (H) 70 - 99 mg/dL    Alkaline Phosphatase 70 40 - 129 U/L    AST 25 10 - 50 U/L    ALT 17 10 - 50 U/L    Protein Total 7.3 6.4 - 8.3 g/dL    Albumin 4.3 3.5 - 5.2 g/dL    Bilirubin Total 0.3 <=1.2 mg/dL    GFR Estimate 61 >60 mL/min/1.73m2      Comment:      eGFR calculated using 2021 CKD-EPI equation.   CBC with platelets   Result Value Ref Range    WBC Count 4.7 4.0 - 11.0 10e3/uL    RBC Count 3.61 (L) 4.40 - 5.90 10e6/uL    Hemoglobin 11.3 (L) 13.3 - 17.7 g/dL    Hematocrit 36.3 (L) 40.0 - 53.0 %     (H) 78 - 100 fL    MCH 31.3 26.5 - 33.0 pg    MCHC 31.1 (L) 31.5 - 36.5 g/dL    RDW 15.1 (H) 10.0 - 15.0 %    Platelet Count 243 150 - 450 10e3/uL             eleanor deficiency anemia confirmed.  You can start with over-the-counter supplement on a daily basis and recheck this in 1 month.     Slight slippage in your kidney function is noted but he overall you are very stable.     Slight lipid control with respect to diabetes is noted as well but again he is pretty stable.  Recheck this in 6 months.     Normal colon cancer screening at this time.     Adam Carvajal PA-C

## 2023-03-25 LAB
DEPRECATED CALCIDIOL+CALCIFEROL SERPL-MC: 23 UG/L (ref 20–75)
LDLC SERPL DIRECT ASSAY-MCNC: 59 MG/DL
TRANSFERRIN SERPL-MCNC: 390 MG/DL (ref 200–360)

## 2023-03-27 ENCOUNTER — TELEPHONE (OUTPATIENT)
Dept: FAMILY MEDICINE | Facility: OTHER | Age: 88
End: 2023-03-27
Payer: COMMERCIAL

## 2023-03-27 NOTE — TELEPHONE ENCOUNTER
Wife calls back. Consent to communicate on file. Patient had indicated at visit on 3/24/23 that he was not taking metoprolol. Wife calls and states that patient didn't realize what it was for and patient IS actually taking this medication.     Wife was given lab results as below, verbatim. She verbalizes understanding and denies further questions at this time.     Alexia Ralph, ISAACN, RN, PHN  Registered Nurse-Clinic Triage  [facility]/[place]  3/27/2023 at 1:15 PM

## 2023-03-27 NOTE — TELEPHONE ENCOUNTER
RN Triage    Patient Contact    Attempt # 1    Was call answered?  No.  Left message on voicemail with information to call me back.      Iron deficiency anemia confirmed.  He can start with his over-the-counter supplement on a daily basis and recheck this in 1 month.     Slight slippage in his kidney function is noted but he overall he is very stable.     Slight lipid control with respect to diabetes is noted as well but again he is pretty stable.  Recheck this in 6 months.     Electronically signed:     ABEL Mcdonald PA-C, RN, PHN  [place]  March 27, 2023

## 2023-04-04 ENCOUNTER — DOCUMENTATION ONLY (OUTPATIENT)
Dept: FAMILY MEDICINE | Facility: OTHER | Age: 88
End: 2023-04-04
Payer: COMMERCIAL

## 2023-04-04 DIAGNOSIS — D50.8 OTHER IRON DEFICIENCY ANEMIA: Primary | ICD-10-CM

## 2023-04-04 NOTE — PROGRESS NOTES
"Roque has a lab appointment on 5.01.23 and there are no orders. The appt note says \"hemoglobin check\". Please place any orders needed at this time.    Thanks,    Harjinder Fajardo, [facility]  "

## 2023-04-09 PROCEDURE — 82274 ASSAY TEST FOR BLOOD FECAL: CPT | Performed by: PHYSICIAN ASSISTANT

## 2023-04-11 LAB — HEMOCCULT STL QL IA: NEGATIVE

## 2023-04-13 ENCOUNTER — TELEPHONE (OUTPATIENT)
Dept: FAMILY MEDICINE | Facility: OTHER | Age: 88
End: 2023-04-13
Payer: COMMERCIAL

## 2023-04-13 NOTE — TELEPHONE ENCOUNTER
Adam Marc PA-C  P Erc Float Pool  Normal colon cancer screening at this time.   Electronically signed:     Adam Marc PA-C

## 2023-05-01 ENCOUNTER — LAB (OUTPATIENT)
Dept: LAB | Facility: OTHER | Age: 88
End: 2023-05-01
Payer: COMMERCIAL

## 2023-05-01 DIAGNOSIS — D50.8 OTHER IRON DEFICIENCY ANEMIA: ICD-10-CM

## 2023-05-01 DIAGNOSIS — I25.810 CORONARY ARTERY DISEASE INVOLVING AUTOLOGOUS ARTERY CORONARY BYPASS GRAFT WITHOUT ANGINA PECTORIS: Primary | ICD-10-CM

## 2023-05-01 LAB
CHOLEST SERPL-MCNC: 135 MG/DL
ERYTHROCYTE [DISTWIDTH] IN BLOOD BY AUTOMATED COUNT: 15.8 % (ref 10–15)
HCT VFR BLD AUTO: 38.3 % (ref 40–53)
HDLC SERPL-MCNC: 39 MG/DL
HGB BLD-MCNC: 11.9 G/DL (ref 13.3–17.7)
LDLC SERPL CALC-MCNC: 64 MG/DL
MCH RBC QN AUTO: 31.8 PG (ref 26.5–33)
MCHC RBC AUTO-ENTMCNC: 31.1 G/DL (ref 31.5–36.5)
MCV RBC AUTO: 102 FL (ref 78–100)
NONHDLC SERPL-MCNC: 96 MG/DL
PLATELET # BLD AUTO: 204 10E3/UL (ref 150–450)
RBC # BLD AUTO: 3.74 10E6/UL (ref 4.4–5.9)
TRIGL SERPL-MCNC: 162 MG/DL
WBC # BLD AUTO: 4.9 10E3/UL (ref 4–11)

## 2023-05-01 PROCEDURE — 36415 COLL VENOUS BLD VENIPUNCTURE: CPT

## 2023-05-01 PROCEDURE — 85027 COMPLETE CBC AUTOMATED: CPT

## 2023-05-01 PROCEDURE — 80061 LIPID PANEL: CPT

## 2023-05-11 ENCOUNTER — TELEPHONE (OUTPATIENT)
Dept: FAMILY MEDICINE | Facility: OTHER | Age: 88
End: 2023-05-11
Payer: COMMERCIAL

## 2023-05-11 NOTE — TELEPHONE ENCOUNTER
Patient calling with questions regarding lab results.   Provided patient with providers message regarding labs from 5/1/23.   He specifically wanted to know his hemoglobin as he is hoping to donate blood. Hgb 11.9. Patient states he will continue to take iron supplement. He reports he cannot donate blood until this is at leas 13.   Patient will schedule lab appointment closer to due date.     Christen GRAYN, RN  [facility]

## 2023-07-20 ENCOUNTER — TRANSFERRED RECORDS (OUTPATIENT)
Dept: HEALTH INFORMATION MANAGEMENT | Facility: CLINIC | Age: 88
End: 2023-07-20
Payer: COMMERCIAL

## 2023-07-20 LAB — RETINOPATHY: NEGATIVE

## 2023-11-15 ENCOUNTER — TELEPHONE (OUTPATIENT)
Dept: FAMILY MEDICINE | Facility: OTHER | Age: 88
End: 2023-11-15
Payer: COMMERCIAL

## 2023-11-15 DIAGNOSIS — D64.9 ANEMIA, UNSPECIFIED TYPE: ICD-10-CM

## 2023-11-15 DIAGNOSIS — E11.9 TYPE 2 DIABETES MELLITUS WITHOUT COMPLICATION, WITHOUT LONG-TERM CURRENT USE OF INSULIN (H): Primary | ICD-10-CM

## 2023-11-15 NOTE — TELEPHONE ENCOUNTER
Order/Referral Request    Who is requesting: Patient    Orders being requested: A1c and hgb    Reason service is needed/diagnosis: patient states he gets these done every 3 months    When are orders needed by: When required by PCP    Has this been discussed with Provider: Yes    Does patient have a preference on a Group/Provider/Facility? ER Lab    Does patient have an appointment scheduled?: No    Where to send orders: Place orders within Epic    Okay to leave a detailed message?: Yes at Home number on file [phone] ([place])

## 2023-11-22 ENCOUNTER — LAB (OUTPATIENT)
Dept: LAB | Facility: OTHER | Age: 88
End: 2023-11-22
Payer: COMMERCIAL

## 2023-11-22 DIAGNOSIS — E11.9 TYPE 2 DIABETES MELLITUS WITHOUT COMPLICATION, WITHOUT LONG-TERM CURRENT USE OF INSULIN (H): ICD-10-CM

## 2023-11-22 DIAGNOSIS — D64.9 ANEMIA, UNSPECIFIED TYPE: ICD-10-CM

## 2023-11-22 DIAGNOSIS — N18.31 CHRONIC KIDNEY DISEASE, STAGE 3A (H): Primary | ICD-10-CM

## 2023-11-22 LAB
CREAT UR-MCNC: 14.8 MG/DL
ERYTHROCYTE [DISTWIDTH] IN BLOOD BY AUTOMATED COUNT: 14.4 % (ref 10–15)
HBA1C MFR BLD: 7.3 % (ref 0–5.6)
HCT VFR BLD AUTO: 42 % (ref 40–53)
HGB BLD-MCNC: 13.2 G/DL (ref 13.3–17.7)
IRON BINDING CAPACITY (ROCHE): 425 UG/DL (ref 240–430)
IRON SATN MFR SERPL: 26 % (ref 15–46)
IRON SERPL-MCNC: 112 UG/DL (ref 61–157)
MCH RBC QN AUTO: 33.8 PG (ref 26.5–33)
MCHC RBC AUTO-ENTMCNC: 31.4 G/DL (ref 31.5–36.5)
MCV RBC AUTO: 108 FL (ref 78–100)
MICROALBUMIN UR-MCNC: 30.6 MG/L
MICROALBUMIN/CREAT UR: 206.76 MG/G CR (ref 0–17)
PLATELET # BLD AUTO: 219 10E3/UL (ref 150–450)
RBC # BLD AUTO: 3.9 10E6/UL (ref 4.4–5.9)
WBC # BLD AUTO: 5 10E3/UL (ref 4–11)

## 2023-11-22 PROCEDURE — 82570 ASSAY OF URINE CREATININE: CPT

## 2023-11-22 PROCEDURE — 83036 HEMOGLOBIN GLYCOSYLATED A1C: CPT

## 2023-11-22 PROCEDURE — 82043 UR ALBUMIN QUANTITATIVE: CPT

## 2023-11-22 PROCEDURE — 85027 COMPLETE CBC AUTOMATED: CPT

## 2023-11-22 PROCEDURE — 83540 ASSAY OF IRON: CPT

## 2023-11-22 PROCEDURE — 83550 IRON BINDING TEST: CPT

## 2023-11-22 PROCEDURE — 36415 COLL VENOUS BLD VENIPUNCTURE: CPT

## 2024-01-31 DIAGNOSIS — K21.00 GASTROESOPHAGEAL REFLUX DISEASE WITH ESOPHAGITIS, UNSPECIFIED WHETHER HEMORRHAGE: ICD-10-CM

## 2024-01-31 RX ORDER — FAMOTIDINE 20 MG/1
20 TABLET, FILM COATED ORAL 2 TIMES DAILY
Qty: 90 TABLET | Refills: 2 | Status: SHIPPED | OUTPATIENT
Start: 2024-01-31 | End: 2024-04-23

## 2024-04-23 DIAGNOSIS — K21.00 GASTROESOPHAGEAL REFLUX DISEASE WITH ESOPHAGITIS, UNSPECIFIED WHETHER HEMORRHAGE: ICD-10-CM

## 2024-04-24 RX ORDER — FAMOTIDINE 20 MG/1
20 TABLET, FILM COATED ORAL 2 TIMES DAILY
Qty: 180 TABLET | Refills: 0 | Status: SHIPPED | OUTPATIENT
Start: 2024-04-24

## 2024-04-29 DIAGNOSIS — F32.A ANXIETY AND DEPRESSION: ICD-10-CM

## 2024-04-29 DIAGNOSIS — F41.9 ANXIETY AND DEPRESSION: ICD-10-CM

## 2024-04-29 RX ORDER — PAROXETINE 30 MG/1
30 TABLET, FILM COATED ORAL DAILY
Qty: 90 TABLET | Refills: 3 | Status: SHIPPED | OUTPATIENT
Start: 2024-04-29

## 2024-05-17 ENCOUNTER — OFFICE VISIT (OUTPATIENT)
Dept: FAMILY MEDICINE | Facility: OTHER | Age: 89
End: 2024-05-17
Payer: COMMERCIAL

## 2024-05-17 VITALS
HEART RATE: 69 BPM | DIASTOLIC BLOOD PRESSURE: 62 MMHG | OXYGEN SATURATION: 97 % | RESPIRATION RATE: 18 BRPM | WEIGHT: 195 LBS | SYSTOLIC BLOOD PRESSURE: 120 MMHG | TEMPERATURE: 98 F | BODY MASS INDEX: 27.85 KG/M2

## 2024-05-17 DIAGNOSIS — F32.5 MAJOR DEPRESSIVE DISORDER IN FULL REMISSION, UNSPECIFIED WHETHER RECURRENT (H): ICD-10-CM

## 2024-05-17 DIAGNOSIS — E11.9 TYPE 2 DIABETES MELLITUS WITHOUT COMPLICATION, WITHOUT LONG-TERM CURRENT USE OF INSULIN (H): ICD-10-CM

## 2024-05-17 DIAGNOSIS — R57.0 CARDIOGENIC SHOCK (H): ICD-10-CM

## 2024-05-17 DIAGNOSIS — I65.21 CAROTID STENOSIS, RIGHT: ICD-10-CM

## 2024-05-17 DIAGNOSIS — N18.31 CHRONIC KIDNEY DISEASE, STAGE 3A (H): ICD-10-CM

## 2024-05-17 DIAGNOSIS — G89.29 CHRONIC PAIN OF RIGHT KNEE: ICD-10-CM

## 2024-05-17 DIAGNOSIS — D50.8 OTHER IRON DEFICIENCY ANEMIA: ICD-10-CM

## 2024-05-17 DIAGNOSIS — I25.810 CORONARY ARTERY DISEASE INVOLVING AUTOLOGOUS ARTERY CORONARY BYPASS GRAFT WITHOUT ANGINA PECTORIS: ICD-10-CM

## 2024-05-17 DIAGNOSIS — R26.81 UNSTEADY GAIT WHEN WALKING: Primary | ICD-10-CM

## 2024-05-17 DIAGNOSIS — M25.561 CHRONIC PAIN OF RIGHT KNEE: ICD-10-CM

## 2024-05-17 PROCEDURE — G2211 COMPLEX E/M VISIT ADD ON: HCPCS | Performed by: PHYSICIAN ASSISTANT

## 2024-05-17 PROCEDURE — 99207 PR FOOT EXAM NO CHARGE: CPT | Performed by: PHYSICIAN ASSISTANT

## 2024-05-17 PROCEDURE — 99214 OFFICE O/P EST MOD 30 MIN: CPT | Performed by: PHYSICIAN ASSISTANT

## 2024-05-17 ASSESSMENT — PAIN SCALES - GENERAL: PAINLEVEL: NO PAIN (1)

## 2024-05-17 NOTE — PROGRESS NOTES
Assessment & Plan   The longitudinal plan of care for the diagnosis(es)/condition(s) as documented were addressed during this visit. Due to the added complexity in care, I will continue to support Roque in the subsequent management and with ongoing continuity of care.    Unsteady gait when walking  Chronic pain of right knee  I suspect that his unsteady gait has to do with chronic right knee pain.  As a watch and leaves the clinic today he does favor that side and has a bit of an antalgic gait/limp.  Physical therapy to evaluate and help with this as well as consult with orthopedics for injection versus the need for consideration of replacement.  - Physical Therapy  Referral; Future  - Orthopedic  Referral; Future    Coronary artery disease involving autologous artery coronary bypass graft without angina pectoris  Cardiogenic shock (H)  Carotid stenosis, right  It has been quite sometime since he was seen by his cardiologist.  Further evaluation based on history of carotid stenosis.  Do imagine that he may need to have intervention with respect to this in the near future.  Related imaging pending.  Follow-up with specialist as planned.  - US Carotid Bilateral; Future  - Adult General Surg Referral; Future  - Echocardiogram Complete; Future    Type 2 diabetes mellitus without complication, without long-term current use of insulin (H)  Chronic kidney disease, stage 3a (H)  Related labs pending at this point in time.  Follow-up based on results.  - Hemoglobin; Future  - Comprehensive metabolic panel (BMP + Alb, Alk Phos, ALT, AST, Total. Bili, TP); Future  - FOOT EXAM  - Lipid panel reflex to direct LDL Fasting; Future  - Hemoglobin A1c; Future  - US Carotid Bilateral; Future  - Adult General Surg Referral; Future  - Echocardiogram Complete; Future    Major depressive disorder in full remission, unspecified whether recurrent (H24)  Stable at this point in time he has no new concerns.  Follow-up in 6  months encouraged.    Other iron deficiency anemia  Related labs pending.  Follow-up based on results.                Christa Nevarez is a 88 year old, presenting for the following health issues:  Knee Pain      5/17/2024     8:05 AM   Additional Questions   Roomed by Rita     Knee Pain    History of Present Illness       Reason for visit:  Cortizone injection in knee    He eats 2-3 servings of fruits and vegetables daily.He consumes 1 sweetened beverage(s) daily.He exercises with enough effort to increase his heart rate 9 or less minutes per day.  He exercises with enough effort to increase his heart rate 4 days per week.   He is taking medications regularly.         Pain History:  When did you first notice your pain? 2 years    Have you seen this provider for your pain in the past? Yes   Where in your body do you have pain? Right knee   Are you seeing anyone else for your pain? Yes - Has seen Minnie melgoza thinks         12/9/2022     2:27 PM 3/24/2023     2:13 PM 5/17/2024     8:01 AM   PHQ-9 SCORE   PHQ-9 Total Score MyChart 1 (Minimal depression) 3 (Minimal depression) 2 (Minimal depression)   PHQ-9 Total Score 1 3 2                 Chronic Pain Follow Up:    Location of pain: Right knee   Analgesia/pain control:    - Recent changes:  Gets worse when doing chores around the house     - Overall control: Tolerable with discomfort    - Current treatments: Cortizone shot    Adherence:     - Do you ever take more pain medicine than prescribed? No    - When did you take your last dose of pain medicine?  He doesn't take an pain medications    Adverse effects: No   PDMP Review       None          Last CSA Agreement:   CSA -- Patient Level:    CSA: None found at the patient level.       Last UDS:               How many servings of fruits and vegetables do you eat daily?  2-3  On average, how many sweetened beverages do you drink each day (Examples: soda, juice, sweet tea, etc.  Do NOT count diet or artificially  sweetened beverages)?   1  How many days per week do you exercise enough to make your heart beat faster? 3 or less  How many minutes a day do you exercise enough to make your heart beat faster? 9 or less  How many days per week do you miss taking your medication? 0            Objective    There were no vitals taken for this visit.  There is no height or weight on file to calculate BMI.  Physical Exam               Signed Electronically by: Adam Winters PA-C

## 2024-06-06 ENCOUNTER — TELEPHONE (OUTPATIENT)
Dept: FAMILY MEDICINE | Facility: OTHER | Age: 89
End: 2024-06-06

## 2024-06-06 ENCOUNTER — LAB (OUTPATIENT)
Dept: LAB | Facility: CLINIC | Age: 89
End: 2024-06-06
Payer: COMMERCIAL

## 2024-06-06 ENCOUNTER — OFFICE VISIT (OUTPATIENT)
Dept: SURGERY | Facility: CLINIC | Age: 89
End: 2024-06-06
Payer: COMMERCIAL

## 2024-06-06 VITALS
TEMPERATURE: 98.7 F | BODY MASS INDEX: 26.28 KG/M2 | HEIGHT: 72 IN | SYSTOLIC BLOOD PRESSURE: 116 MMHG | DIASTOLIC BLOOD PRESSURE: 72 MMHG | WEIGHT: 194 LBS

## 2024-06-06 DIAGNOSIS — E11.9 TYPE 2 DIABETES MELLITUS WITHOUT COMPLICATION, WITHOUT LONG-TERM CURRENT USE OF INSULIN (H): ICD-10-CM

## 2024-06-06 DIAGNOSIS — I25.810 CORONARY ARTERY DISEASE INVOLVING AUTOLOGOUS ARTERY CORONARY BYPASS GRAFT WITHOUT ANGINA PECTORIS: ICD-10-CM

## 2024-06-06 DIAGNOSIS — I65.21 CAROTID STENOSIS, RIGHT: Primary | ICD-10-CM

## 2024-06-06 DIAGNOSIS — N18.31 CHRONIC KIDNEY DISEASE, STAGE 3A (H): ICD-10-CM

## 2024-06-06 LAB
ALBUMIN SERPL BCG-MCNC: 4.2 G/DL (ref 3.5–5.2)
ALP SERPL-CCNC: 73 U/L (ref 40–150)
ALT SERPL W P-5'-P-CCNC: 18 U/L (ref 0–70)
ANION GAP SERPL CALCULATED.3IONS-SCNC: 10 MMOL/L (ref 7–15)
AST SERPL W P-5'-P-CCNC: 25 U/L (ref 0–45)
BILIRUB SERPL-MCNC: 0.5 MG/DL
BUN SERPL-MCNC: 35.9 MG/DL (ref 8–23)
CALCIUM SERPL-MCNC: 9.1 MG/DL (ref 8.8–10.2)
CHLORIDE SERPL-SCNC: 105 MMOL/L (ref 98–107)
CHOLEST SERPL-MCNC: 130 MG/DL
CREAT SERPL-MCNC: 1.3 MG/DL (ref 0.67–1.17)
DEPRECATED HCO3 PLAS-SCNC: 22 MMOL/L (ref 22–29)
EGFRCR SERPLBLD CKD-EPI 2021: 54 ML/MIN/1.73M2
FASTING STATUS PATIENT QL REPORTED: YES
FASTING STATUS PATIENT QL REPORTED: YES
GLUCOSE SERPL-MCNC: 144 MG/DL (ref 70–99)
HBA1C MFR BLD: 7.7 %
HDLC SERPL-MCNC: 50 MG/DL
HGB BLD-MCNC: 12.1 G/DL (ref 13.3–17.7)
LDLC SERPL CALC-MCNC: 61 MG/DL
NONHDLC SERPL-MCNC: 80 MG/DL
POTASSIUM SERPL-SCNC: 5.3 MMOL/L (ref 3.4–5.3)
PROT SERPL-MCNC: 7.3 G/DL (ref 6.4–8.3)
SODIUM SERPL-SCNC: 137 MMOL/L (ref 135–145)
TRIGL SERPL-MCNC: 97 MG/DL

## 2024-06-06 PROCEDURE — 36415 COLL VENOUS BLD VENIPUNCTURE: CPT

## 2024-06-06 PROCEDURE — 80061 LIPID PANEL: CPT

## 2024-06-06 PROCEDURE — 80053 COMPREHEN METABOLIC PANEL: CPT

## 2024-06-06 PROCEDURE — 83036 HEMOGLOBIN GLYCOSYLATED A1C: CPT

## 2024-06-06 PROCEDURE — 99203 OFFICE O/P NEW LOW 30 MIN: CPT | Performed by: SPECIALIST

## 2024-06-06 PROCEDURE — 85018 HEMOGLOBIN: CPT

## 2024-06-06 ASSESSMENT — PAIN SCALES - GENERAL: PAINLEVEL: NO PAIN (0)

## 2024-06-06 NOTE — LETTER
June 7, 2024      Roque Mckeon  [address]  [facility] [address]        Dear ,    We are writing to inform you of your test results.    Emma Mahan is covering for Adam Winters and read your recent results. She said that your hemoglobin A1C is slightly improved, but still above goal. Based on his office visit notes, she will have Adam reach out to you when he returns with medication recommendations/changes. Please call us at [phone] or testhub with any questions you may have.       Resulted Orders   Hemoglobin   Result Value Ref Range    Hemoglobin 12.1 (L) 13.3 - 17.7 g/dL   Comprehensive metabolic panel (BMP + Alb, Alk Phos, ALT, AST, Total. Bili, TP)   Result Value Ref Range    Sodium 137 135 - 145 mmol/L      Comment:      Reference intervals for this test were updated on 09/26/2023 to more accurately reflect our healthy population. There may be differences in the flagging of prior results with similar values performed with this method. Interpretation of those prior results can be made in the context of the updated reference intervals.     Potassium 5.3 3.4 - 5.3 mmol/L    Carbon Dioxide (CO2) 22 22 - 29 mmol/L    Anion Gap 10 7 - 15 mmol/L    Urea Nitrogen 35.9 (H) 8.0 - 23.0 mg/dL    Creatinine 1.30 (H) 0.67 - 1.17 mg/dL    GFR Estimate 54 (L) >60 mL/min/1.73m2    Calcium 9.1 8.8 - 10.2 mg/dL    Chloride 105 98 - 107 mmol/L    Glucose 144 (H) 70 - 99 mg/dL    Alkaline Phosphatase 73 40 - 150 U/L    AST 25 0 - 45 U/L      Comment:      Reference intervals for this test were updated on 6/12/2023 to more accurately reflect our healthy population. There may be differences in the flagging of prior results with similar values performed with this method. Interpretation of those prior results can be made in the context of the updated reference intervals.    ALT 18 0 - 70 U/L      Comment:      Reference intervals for this test were updated on 6/12/2023 to more accurately reflect our healthy  population. There may be differences in the flagging of prior results with similar values performed with this method. Interpretation of those prior results can be made in the context of the updated reference intervals.      Protein Total 7.3 6.4 - 8.3 g/dL    Albumin 4.2 3.5 - 5.2 g/dL    Bilirubin Total 0.5 <=1.2 mg/dL    Patient Fasting > 8hrs? Yes    Lipid panel reflex to direct LDL Fasting   Result Value Ref Range    Cholesterol 130 <200 mg/dL    Triglycerides 97 <150 mg/dL    Direct Measure HDL 50 >=40 mg/dL    LDL Cholesterol Calculated 61 <=100 mg/dL    Non HDL Cholesterol 80 <130 mg/dL    Patient Fasting > 8hrs? Yes     Narrative    Cholesterol  Desirable:  <200 mg/dL    Triglycerides  Normal:  Less than 150 mg/dL  Borderline High:  150-199 mg/dL  High:  200-499 mg/dL  Very High:  Greater than or equal to 500 mg/dL    Direct Measure HDL  Female:  Greater than or equal to 50 mg/dL   Male:  Greater than or equal to 40 mg/dL    LDL Cholesterol  Desirable:  <100mg/dL  Above Desirable:  100-129 mg/dL   Borderline High:  130-159 mg/dL   High:  160-189 mg/dL   Very High:  >= 190 mg/dL    Non HDL Cholesterol  Desirable:  130 mg/dL  Above Desirable:  130-159 mg/dL  Borderline High:  160-189 mg/dL  High:  190-219 mg/dL  Very High:  Greater than or equal to 220 mg/dL   Hemoglobin A1c   Result Value Ref Range    Hemoglobin A1C 7.7 (H) <5.7 %      Comment:      Normal <5.7%   Prediabetes 5.7-6.4%    Diabetes 6.5% or higher     Note: Adopted from ADA consensus guidelines.

## 2024-06-06 NOTE — TELEPHONE ENCOUNTER
----- Message from HETAL Chong CNP sent at 6/6/2024 11:48 AM CDT -----  J patient, KV covering. Hemoglobin A1C is slightly improved, but still above goal. Please let patient know that his primary is out of clinic and based on office visit notes I will have him reach out to patient when he returns with medication recommendations/changes.       HETAL Mcgrath CNP  Questions or concerns please feel free to send me a [employer] message or call me  Phone : [phone]

## 2024-06-06 NOTE — LETTER
6/6/2024      Roque Mckeon  [address] [address]  [place] [facility] [address]      Dear Colleague,    Thank you for referring your patient, Roque Mckeon, to the [facility]. Please see a copy of my visit note below.    Follow-up for left carotid stenosis    Subjective:  This is an 88-year-old gentleman last seen by me 4 years ago for right carotid stenosis.  It was 75% on CTA.  He did not want surgery at that time and was manage nonoperative with Plavix.  He was followed with the least one serial ultrasound with me and the velocities remain stable.  He is also another ultrasound at an outside facility that suggested lesion progressed.  Denies any symptoms.  Did have open heart surgery about 2-1/2 years ago.  Feels good otherwise.        Objective:  B/P: 116/72, T: 98.7, P: Data Unavailable, R: Data Unavailable  Neck: No carotid bruits.  Neuro: Moves all 4 extremities, sensory intact.        Assessment/plan:  This is an 88-year-old gentleman well-known to me for a right carotid stenosis.  He opted to manage it medically at that time.  He did have an ultrasound in 2021 that suggested disease progression at an outside institution.  He now presents for follow-up.  He has been asymptomatic.  After discussion the patient plan at this time is to repeat his CTA to rule out disease progression based on the outside ultrasound.  He will follow-up with me after the CTA.    Phillip Ma MD, FACS      Again, thank you for allowing me to participate in the care of your patient.        Sincerely,        Phillip Ma MD

## 2024-06-06 NOTE — PROGRESS NOTES
Follow-up for left carotid stenosis    Subjective:  This is an 88-year-old gentleman last seen by me 4 years ago for right carotid stenosis.  It was 75% on CTA.  He did not want surgery at that time and was manage nonoperative with Plavix.  He was followed with the least one serial ultrasound with me and the velocities remain stable.  He is also another ultrasound at an outside facility that suggested lesion progressed.  Denies any symptoms.  Did have open heart surgery about 2-1/2 years ago.  Feels good otherwise.        Objective:  B/P: 116/72, T: 98.7, P: Data Unavailable, R: Data Unavailable  Neck: No carotid bruits.  Neuro: Moves all 4 extremities, sensory intact.        Assessment/plan:  This is an 88-year-old gentleman well-known to me for a right carotid stenosis.  He opted to manage it medically at that time.  He did have an ultrasound in 2021 that suggested disease progression at an outside institution.  He now presents for follow-up.  He has been asymptomatic.  After discussion the patient plan at this time is to repeat his CTA to rule out disease progression based on the outside ultrasound.  He will follow-up with me after the CTA.    Phillip Ma MD, FACS

## 2024-06-07 NOTE — TELEPHONE ENCOUNTER
Two call attempts have been made to reach patient with results, no call back from patient.   Please create letter with results and mail to patient.     Christen GRAYN, RN

## 2024-06-10 NOTE — TELEPHONE ENCOUNTER
Attempt #1 to call patient.     RN left voicemail and requested return call to [facility] at [phone]    Lata Benton RN  [facility]: [place]

## 2024-06-10 NOTE — TELEPHONE ENCOUNTER
Adam Winters PA-C  6/10/2024  6:34 AM CDT Back to Top      Slight worsening of kidney function is noted here.  Do recommend a recheck in 1 to 2 months from the time of the lab draw.  In the meantime increase clear healthy fluids and decreasing the use of any nonsteroidal anti-inflammatories he may be taking or any other over-the-counter supplements would be wise. If at all possible the reduction of Lasix would be wise as well.  Electronically signed:     Adam Carvajal PA-C       Please call with additional information regarding results from PCP.     Christen GRAYN, RN

## 2024-06-11 NOTE — TELEPHONE ENCOUNTER
Called patient and relayed providers message. Patient expressed understanding. He will drink more water and not use nonsteroidal antiinflammatory medications. He will call back to schedule a lab follow up appt at a later date.  Did relay previous message regarding his A1c too.  No further questions or concerns at this time.  Janel Friend RN on 6/11/2024 at 8:02 AM

## 2024-06-14 ENCOUNTER — HOSPITAL ENCOUNTER (OUTPATIENT)
Dept: CT IMAGING | Facility: CLINIC | Age: 89
Discharge: HOME | End: 2024-06-14
Attending: SPECIALIST | Admitting: SPECIALIST
Payer: COMMERCIAL

## 2024-06-14 DIAGNOSIS — I65.21 CAROTID STENOSIS, RIGHT: ICD-10-CM

## 2024-06-14 PROCEDURE — 250N000009 HC RX 250: Performed by: SPECIALIST

## 2024-06-14 PROCEDURE — 250N000011 HC RX IP 250 OP 636: Performed by: SPECIALIST

## 2024-06-14 PROCEDURE — 70496 CT ANGIOGRAPHY HEAD: CPT

## 2024-06-14 RX ORDER — IOPAMIDOL 755 MG/ML
500 INJECTION, SOLUTION INTRAVASCULAR ONCE
Status: COMPLETED | OUTPATIENT
Start: 2024-06-14 | End: 2024-06-14

## 2024-06-14 RX ADMIN — SODIUM CHLORIDE 100 ML: 9 INJECTION, SOLUTION INTRAVENOUS at 09:40

## 2024-06-14 RX ADMIN — IOPAMIDOL 70 ML: 755 INJECTION, SOLUTION INTRAVENOUS at 09:40

## 2024-06-20 ENCOUNTER — OFFICE VISIT (OUTPATIENT)
Dept: SURGERY | Facility: CLINIC | Age: 89
End: 2024-06-20
Payer: COMMERCIAL

## 2024-06-20 VITALS
DIASTOLIC BLOOD PRESSURE: 70 MMHG | BODY MASS INDEX: 26.28 KG/M2 | SYSTOLIC BLOOD PRESSURE: 120 MMHG | WEIGHT: 194 LBS | TEMPERATURE: 95.1 F | HEIGHT: 72 IN

## 2024-06-20 DIAGNOSIS — I65.21 CAROTID STENOSIS, RIGHT: Primary | ICD-10-CM

## 2024-06-20 PROCEDURE — 99213 OFFICE O/P EST LOW 20 MIN: CPT | Performed by: SPECIALIST

## 2024-06-20 ASSESSMENT — PAIN SCALES - GENERAL: PAINLEVEL: NO PAIN (0)

## 2024-06-20 NOTE — PROGRESS NOTES
Follow-up for right carotid stenosis    Subjective:  This is an 88-year-old gentleman last seen by me 4 years ago for right carotid stenosis.  It was 75% on CTA.  He did not want surgery at that time and was manage nonoperative with Plavix.  He was followed with the least one serial ultrasound with me and the velocities remain stable.  He is also another ultrasound at an outside facility that suggested lesion progressed.  Denies any symptoms.  Did have open heart surgery about 2-1/2 years ago.  Feels good otherwise.    He had a CTA for which he now follows up.    Objective:  B/P: 116/72, T: 98.7, P: Data Unavailable, R: Data Unavailable  Neck: No carotid bruits.  Neuro: Moves all 4 extremities, sensory intact.    CT -   CT ANGIOGRAM OF THE HEAD AND NECK WITHOUT AND WITH CONTRAST  6/14/2024  9:57 AM      COMPARISON: None.     HISTORY: Patient with known right carotid stenosis.  Ultrasound  suggest progression of disease. Carotid stenosis, right.     TECHNIQUE:  Precontrast localizing scans were followed by CT  angiography with an injection of 70 mL Isovue-370 nonionic intravenous  contrast material with scans through the head and neck.  Images were  transferred to a separate 3-D workstation where multiplanar  reformations and 3-D images were created.  Estimates of carotid  stenoses are made relative to the distal internal carotid artery  diameters except as noted.       FINDINGS:   Neck CTA: The common carotid arteries bilaterally are patent without  stenosis. There is severe atherosclerotic narrowing (likely greater  than 90% luminal diameter stenosis) at the origin of the right  internal carotid artery due to calcified and noncalcified plaque.  There is plaque without stenosis at the left ICA origin. There is mild  to moderate atherosclerotic narrowing at the left vertebral origin.  The vertebral arteries bilaterally are otherwise patent and  unremarkable.     Head CTA: There is calcified plaque of the intracranial  distal  internal carotid arteries on both sides that does not result in any  significant vascular narrowing. The basilar, bilateral intracranial  distal internal carotid, bilateral anterior cerebral, bilateral middle  cerebral and bilateral posterior cerebral arteries are patent and  unremarkable.                                                                      IMPRESSION:  1. Severe atherosclerotic narrowing (likely greater than 90% luminal  diameter stenosis) at the origin of the right internal carotid artery.  2. Plaque without stenosis at the left internal carotid origin and  intracranial distal internal carotid arteries bilaterally.  3. Mild to moderate atherosclerotic narrowing at the left vertebral  origin.  4. Otherwise, normal neck and head CTA.        Radiation dose for this scan was reduced using automated exposure  control, adjustment of the mA and/or kV according to patient size, or  iterative reconstruction technique.     BEE SIU MD          Assessment/plan:  This is an 88-year-old gentleman well-known to me for a right carotid stenosis.  He opted to manage it medically at that time.  He did have an ultrasound in 2021 that suggested disease progression at an outside institution.  His repeat CTA revealed progression at 90%.  He now meets criteria for intervention.  I discussed these findings with the patient and his wife and expressed understanding.  I gave him the option of going to the  [employer] [facility]  or [facility].  They do not like to drive in the cities but prefer prefer to go to [facility] as that is where he had his open heart surgery.  After discussion with the patient the plan at this time is to have him contact the cardiac surgeon to see who recommends for carotid artery and carotid endarterectomy at that institution.  He knows to call me if he cannot get in touch with him.      Phillip Ma MD, FACS

## 2024-06-20 NOTE — LETTER
6/20/2024      Roque Mckeon  [address]  [place] [address]      Dear Colleague,    Thank you for referring your patient, Roque Mckeon, to the [facility]. Please see a copy of my visit note below.    Follow-up for right carotid stenosis    Subjective:  This is an 88-year-old gentleman last seen by me 4 years ago for right carotid stenosis.  It was 75% on CTA.  He did not want surgery at that time and was manage nonoperative with Plavix.  He was followed with the least one serial ultrasound with me and the velocities remain stable.  He is also another ultrasound at an outside facility that suggested lesion progressed.  Denies any symptoms.  Did have open heart surgery about 2-1/2 years ago.  Feels good otherwise.    He had a CTA for which he now follows up.    Objective:  B/P: 116/72, T: 98.7, P: Data Unavailable, R: Data Unavailable  Neck: No carotid bruits.  Neuro: Moves all 4 extremities, sensory intact.    CT -   CT ANGIOGRAM OF THE HEAD AND NECK WITHOUT AND WITH CONTRAST  6/14/2024  9:57 AM      COMPARISON: None.     HISTORY: Patient with known right carotid stenosis.  Ultrasound  suggest progression of disease. Carotid stenosis, right.     TECHNIQUE:  Precontrast localizing scans were followed by CT  angiography with an injection of 70 mL Isovue-370 nonionic intravenous  contrast material with scans through the head and neck.  Images were  transferred to a separate 3-D workstation where multiplanar  reformations and 3-D images were created.  Estimates of carotid  stenoses are made relative to the distal internal carotid artery  diameters except as noted.       FINDINGS:   Neck CTA: The common carotid arteries bilaterally are patent without  stenosis. There is severe atherosclerotic narrowing (likely greater  than 90% luminal diameter stenosis) at the origin of the right  internal carotid artery due to calcified and noncalcified plaque.  There is plaque without stenosis at the  left ICA origin. There is mild  to moderate atherosclerotic narrowing at the left vertebral origin.  The vertebral arteries bilaterally are otherwise patent and  unremarkable.     Head CTA: There is calcified plaque of the intracranial distal  internal carotid arteries on both sides that does not result in any  significant vascular narrowing. The basilar, bilateral intracranial  distal internal carotid, bilateral anterior cerebral, bilateral middle  cerebral and bilateral posterior cerebral arteries are patent and  unremarkable.                                                                      IMPRESSION:  1. Severe atherosclerotic narrowing (likely greater than 90% luminal  diameter stenosis) at the origin of the right internal carotid artery.  2. Plaque without stenosis at the left internal carotid origin and  intracranial distal internal carotid arteries bilaterally.  3. Mild to moderate atherosclerotic narrowing at the left vertebral  origin.  4. Otherwise, normal neck and head CTA.        Radiation dose for this scan was reduced using automated exposure  control, adjustment of the mA and/or kV according to patient size, or  iterative reconstruction technique.     BEE SIU MD          Assessment/plan:  This is an 88-year-old gentleman well-known to me for a right carotid stenosis.  He opted to manage it medically at that time.  He did have an ultrasound in 2021 that suggested disease progression at an outside institution.  His repeat CTA revealed progression at 90%.  He now meets criteria for intervention.  I discussed these findings with the patient and his wife and expressed understanding.  I gave him the option of going to the [place] or [facility].  They do not like to drive in the cities but prefer prefer to go to [facility] as that is where he had his open heart surgery.  After discussion with the patient the plan at this time is to have him contact the cardiac surgeon to see who recommends for carotid  artery and carotid endarterectomy at that institution.  He knows to call me if he cannot get in touch with him.      Phillip Ma MD, FACS      Again, thank you for allowing me to participate in the care of your patient.        Sincerely,        Phillip Ma MD

## 2024-06-24 DIAGNOSIS — K21.00 GASTROESOPHAGEAL REFLUX DISEASE WITH ESOPHAGITIS, UNSPECIFIED WHETHER HEMORRHAGE: ICD-10-CM

## 2024-06-24 RX ORDER — FAMOTIDINE 20 MG/1
20 TABLET, FILM COATED ORAL 2 TIMES DAILY
Qty: 180 TABLET | Refills: 0 | OUTPATIENT
Start: 2024-06-24

## 2024-07-05 ENCOUNTER — TELEPHONE (OUTPATIENT)
Dept: FAMILY MEDICINE | Facility: OTHER | Age: 89
End: 2024-07-05
Payer: COMMERCIAL

## 2024-07-05 NOTE — TELEPHONE ENCOUNTER
Reason for Call:  Appointment Request    Patient requesting this type of appt: Pre-op    Requested provider:  any    Reason patient unable to be scheduled: Not within requested timeframe    When does patient want to be seen/preferred time:  7-12    Comments: patient has an appt on 7-12 for an echo in [place] and is wondering if anyone would work him in there for a pre op that day. His appt is at 9 am.     Okay to leave a detailed message?: Yes at [phone]    Call taken on 7/5/2024 at 11:28 AM by Deena Gaitan

## 2024-07-12 ENCOUNTER — HOSPITAL ENCOUNTER (OUTPATIENT)
Dept: CARDIOLOGY | Facility: CLINIC | Age: 89
Discharge: HOME | End: 2024-07-12
Attending: PHYSICIAN ASSISTANT | Admitting: PHYSICIAN ASSISTANT
Payer: COMMERCIAL

## 2024-07-12 DIAGNOSIS — I25.810 CORONARY ARTERY DISEASE INVOLVING AUTOLOGOUS ARTERY CORONARY BYPASS GRAFT WITHOUT ANGINA PECTORIS: ICD-10-CM

## 2024-07-12 DIAGNOSIS — I65.21 CAROTID STENOSIS, RIGHT: ICD-10-CM

## 2024-07-12 DIAGNOSIS — E11.9 TYPE 2 DIABETES MELLITUS WITHOUT COMPLICATION, WITHOUT LONG-TERM CURRENT USE OF INSULIN (H): ICD-10-CM

## 2024-07-12 LAB — LVEF ECHO: NORMAL

## 2024-07-12 PROCEDURE — 255N000002 HC RX 255 OP 636: Performed by: INTERNAL MEDICINE

## 2024-07-12 PROCEDURE — 999N000208 ECHOCARDIOGRAM COMPLETE

## 2024-07-12 PROCEDURE — C8929 TTE W OR WO FOL WCON,DOPPLER: HCPCS

## 2024-07-12 PROCEDURE — 93306 TTE W/DOPPLER COMPLETE: CPT | Mod: 26 | Performed by: INTERNAL MEDICINE

## 2024-07-12 RX ADMIN — HUMAN ALBUMIN MICROSPHERES AND PERFLUTREN 4 ML: 10; .22 INJECTION, SOLUTION INTRAVENOUS at 09:40

## 2024-07-12 NOTE — LETTER
2024      Savage Mckeon  [address]  [place] [address]              Dear ,    We are writing to inform you of your test results.    All things considered this echocardiogram is very consistent with his overall condition as well and is similar to that from 3 years ago.     Resulted Orders   Echocardiogram Complete   Result Value Ref Range    LVEF  45-50%     Narrative    [id number]  [facility]  [id number]  [id number]^JYOTHI DURÁN^LUIZ     [facility]  Echocardiography Laboratory  [address] [place] [address]     Name: SAVAGE MCKEON  MRN: [id number]  : 1935  Study Date: 2024 09:09 AM  Age: 88 yrs  Gender: Male  Patient Location: [facility]  Reason For Study: Type 2 diabetes mellitus without complication, without long-  term  History: s/p CABG, CAD, NSTEMI, LBBB, CKD  Ordering Physician: LUIZ MACARIO  Referring Physician: LUIZ MACARIO  Performed By: Kusum King     BSA: 2.1 m2  Height: 71 in  Weight: 194 lb  HR: 85  BP: 140/78 mmHg  ______________________________________________________________________________  Procedure  Optison (NDC #[id number]) given intravenously. Complete Echo Adult.  ______________________________________________________________________________  Interpretation Summary     The visual ejection fraction is 45-50%.  Septal motion is consistent with conduction abnormality.  There is moderate inferior and inferolateral wall hypokinesis.  No significant valvular heart disease.  ______________________________________________________________________________  Left Ventricle  The left ventricle is normal in size. There is normal left ventricular wall  thickness. The visual ejection fraction is 45-50%. Septal motion is consistent  with conduction abnormality. There is moderate inferior and inferolateral wall  hypokinesis.     Right Ventricle  The right ventricle is normal in size and function.     Atria  Normal left atrial size. Right atrial  size is normal. There is no color  Doppler evidence of an atrial shunt.     Mitral Valve  The mitral valve leaflets are mildly thickened. There is trace mitral  regurgitation.     Tricuspid Valve  There is trace tricuspid regurgitation. IVC diameter <2.1 cm collapsing >50%  with sniff suggests a normal RA pressure of 3 mmHg. The right ventricular  systolic pressure is approximated at 32.7 mmHg plus the right atrial pressure.     Aortic Valve  There is trivial trileaflet aortic sclerosis. No aortic regurgitation is  present.     Pulmonic Valve  There is trace pulmonic valvular regurgitation.     Vessels  Normal size aorta. The aortic root is normal size.     Pericardium  There is no pericardial effusion.     ______________________________________________________________________________  MMode/2D Measurements & Calculations  IVSd: 1.1 cm  LVIDd: 4.8 cm  LVIDs: 3.6 cm  LVPWd: 1.1 cm  FS: 26.5 %  LV mass(C)d: 201.0 grams  LV mass(C)dI: 96.6 grams/m2     Ao root diam: 3.2 cm  LA dimension: 4.8 cm  asc Aorta Diam: 3.4 cm  LA/Ao: 1.5  Ao root diam index Ht(cm/m): 1.8  Ao root diam index BSA (cm/m2): 1.5  Asc Ao diam index BSA (cm/m2): 1.6  Asc Ao diam index Ht(cm/m): 1.9  EF Biplane: 45.8 %  LA Volume (BP): 45.5 ml  LA Volume Index (BP): 21.9 ml/m2     RV Base: 4.4 cm  RWT: 0.47  TAPSE: 1.00 cm     Doppler Measurements & Calculations  MV E max jaime: 56.6 cm/sec  MV A max jaime: 103.3 cm/sec  MV E/A: 0.55  MV dec slope: 212.7 cm/sec2  MV dec time: 0.27 sec  TR max jaime: 286.0 cm/sec  TR max P.7 mmHg  Lateral E/e': 8.1  RV S Jaime: 8.5 cm/sec     ______________________________________________________________________________  Report approved by: Jeffrey Lira 2024 11:24 AM             If you have any questions or concerns, please call the clinic at the number listed above.       Sincerely,      Adam Winters PA-C

## 2024-07-15 ENCOUNTER — OFFICE VISIT (OUTPATIENT)
Dept: FAMILY MEDICINE | Facility: OTHER | Age: 89
End: 2024-07-15
Payer: COMMERCIAL

## 2024-07-15 VITALS
WEIGHT: 189 LBS | TEMPERATURE: 97.2 F | DIASTOLIC BLOOD PRESSURE: 68 MMHG | HEIGHT: 72 IN | RESPIRATION RATE: 18 BRPM | OXYGEN SATURATION: 98 % | HEART RATE: 96 BPM | BODY MASS INDEX: 25.6 KG/M2 | SYSTOLIC BLOOD PRESSURE: 124 MMHG

## 2024-07-15 DIAGNOSIS — I44.7 LBBB (LEFT BUNDLE BRANCH BLOCK): ICD-10-CM

## 2024-07-15 DIAGNOSIS — I50.9 CONGESTIVE HEART FAILURE, UNSPECIFIED HF CHRONICITY, UNSPECIFIED HEART FAILURE TYPE (H): ICD-10-CM

## 2024-07-15 DIAGNOSIS — S00.83XA FOREHEAD CONTUSION, INITIAL ENCOUNTER: ICD-10-CM
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
## 2024-07-15 DIAGNOSIS — R79.89 ELEVATED BRAIN NATRIURETIC PEPTIDE (BNP) LEVEL: ICD-10-CM

## 2024-07-15 DIAGNOSIS — Z23 NEED FOR SHINGLES VACCINE: ICD-10-CM

## 2024-07-15 DIAGNOSIS — I25.810 CORONARY ARTERY DISEASE INVOLVING AUTOLOGOUS ARTERY CORONARY BYPASS GRAFT WITHOUT ANGINA PECTORIS: ICD-10-CM

## 2024-07-15 DIAGNOSIS — E11.9 TYPE 2 DIABETES MELLITUS WITHOUT COMPLICATION, WITHOUT LONG-TERM CURRENT USE OF INSULIN (H): Primary | ICD-10-CM

## 2024-07-15 DIAGNOSIS — M25.561 ACUTE PAIN OF RIGHT KNEE: ICD-10-CM

## 2024-07-15 DIAGNOSIS — F43.23 ADJUSTMENT DISORDER WITH MIXED ANXIETY AND DEPRESSED MOOD: ICD-10-CM

## 2024-07-15 DIAGNOSIS — N18.31 CHRONIC KIDNEY DISEASE, STAGE 3A (H): ICD-10-CM
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
## 2024-07-15 DIAGNOSIS — I65.21 CAROTID STENOSIS, RIGHT: ICD-10-CM

## 2024-07-15 DIAGNOSIS — Z29.11 NEED FOR VACCINATION AGAINST RESPIRATORY SYNCYTIAL VIRUS: ICD-10-CM

## 2024-07-15 DIAGNOSIS — Z01.818 PREOP GENERAL PHYSICAL EXAM: ICD-10-CM

## 2024-07-15 DIAGNOSIS — D50.8 OTHER IRON DEFICIENCY ANEMIA: ICD-10-CM

## 2024-07-15 PROBLEM — I21.4 NSTEMI (NON-ST ELEVATED MYOCARDIAL INFARCTION) (H): Status: RESOLVED | Noted: 2021-11-23 | Resolved: 2024-07-15

## 2024-07-15 PROBLEM — H02.839 DERMATOCHALASIS: Status: ACTIVE | Noted: 2024-07-15

## 2024-07-15 PROBLEM — I25.10 ARTERIOSCLEROSIS OF CORONARY ARTERY: Status: ACTIVE | Noted: 2024-07-15

## 2024-07-15 LAB
ALBUMIN SERPL BCG-MCNC: 4.2 G/DL (ref 3.5–5.2)
ALP SERPL-CCNC: 87 U/L (ref 40–150)
ALT SERPL W P-5'-P-CCNC: 19 U/L (ref 0–70)
ANION GAP SERPL CALCULATED.3IONS-SCNC: 10 MMOL/L (ref 7–15)
AST SERPL W P-5'-P-CCNC: 21 U/L (ref 0–45)
BASOPHILS # BLD AUTO: 0 10E3/UL (ref 0–0.2)
BASOPHILS NFR BLD AUTO: 0 %
BILIRUB SERPL-MCNC: 0.5 MG/DL
BUN SERPL-MCNC: 27.1 MG/DL (ref 8–23)
CALCIUM SERPL-MCNC: 9.3 MG/DL (ref 8.8–10.2)
CHLORIDE SERPL-SCNC: 102 MMOL/L (ref 98–107)
CREAT SERPL-MCNC: 1.2 MG/DL (ref 0.67–1.17)
EGFRCR SERPLBLD CKD-EPI 2021: 59 ML/MIN/1.73M2
EOSINOPHIL # BLD AUTO: 0.1 10E3/UL (ref 0–0.7)
EOSINOPHIL NFR BLD AUTO: 1 %
ERYTHROCYTE [DISTWIDTH] IN BLOOD BY AUTOMATED COUNT: 15.5 % (ref 10–15)
GLUCOSE SERPL-MCNC: 174 MG/DL (ref 70–99)
HCO3 SERPL-SCNC: 25 MMOL/L (ref 22–29)
HCT VFR BLD AUTO: 37.6 % (ref 40–53)
HGB BLD-MCNC: 12.2 G/DL (ref 13.3–17.7)
IMM GRANULOCYTES # BLD: 0 10E3/UL
IMM GRANULOCYTES NFR BLD: 0 %
LYMPHOCYTES # BLD AUTO: 0.9 10E3/UL (ref 0.8–5.3)
LYMPHOCYTES NFR BLD AUTO: 17 %
MCH RBC QN AUTO: 33.6 PG (ref 26.5–33)
MCHC RBC AUTO-ENTMCNC: 32.4 G/DL (ref 31.5–36.5)
MCV RBC AUTO: 104 FL (ref 78–100)
MONOCYTES # BLD AUTO: 0.5 10E3/UL (ref 0–1.3)
MONOCYTES NFR BLD AUTO: 10 %
NEUTROPHILS # BLD AUTO: 4 10E3/UL (ref 1.6–8.3)
NEUTROPHILS NFR BLD AUTO: 72 %
NT-PROBNP SERPL-MCNC: 1115 PG/ML (ref 0–1800)
PLATELET # BLD AUTO: 229 10E3/UL (ref 150–450)
POTASSIUM SERPL-SCNC: 5.2 MMOL/L (ref 3.4–5.3)
PROT SERPL-MCNC: 7.4 G/DL (ref 6.4–8.3)
RBC # BLD AUTO: 3.63 10E6/UL (ref 4.4–5.9)
SODIUM SERPL-SCNC: 137 MMOL/L (ref 135–145)
WBC # BLD AUTO: 5.5 10E3/UL (ref 4–11)

## 2024-07-15 PROCEDURE — 99215 OFFICE O/P EST HI 40 MIN: CPT | Performed by: PHYSICIAN ASSISTANT

## 2024-07-15 PROCEDURE — 93000 ELECTROCARDIOGRAM COMPLETE: CPT | Performed by: PHYSICIAN ASSISTANT

## 2024-07-15 PROCEDURE — 85025 COMPLETE CBC W/AUTO DIFF WBC: CPT | Performed by: PHYSICIAN ASSISTANT

## 2024-07-15 PROCEDURE — 83880 ASSAY OF NATRIURETIC PEPTIDE: CPT | Performed by: PHYSICIAN ASSISTANT

## 2024-07-15 PROCEDURE — 36415 COLL VENOUS BLD VENIPUNCTURE: CPT | Performed by: PHYSICIAN ASSISTANT

## 2024-07-15 PROCEDURE — 80053 COMPREHEN METABOLIC PANEL: CPT | Performed by: PHYSICIAN ASSISTANT

## 2024-07-15 RX ORDER — METOPROLOL SUCCINATE 50 MG/1
50 TABLET, EXTENDED RELEASE ORAL
COMMUNITY
Start: 2023-05-25

## 2024-07-15 RX ORDER — RESPIRATORY SYNCYTIAL VIRUS VACCINE 120MCG/0.5
0.5 KIT INTRAMUSCULAR ONCE
Qty: 1 EACH | Refills: 0 | Status: SHIPPED | OUTPATIENT
Start: 2024-07-15 | End: 2024-07-15

## 2024-07-15 ASSESSMENT — PAIN SCALES - GENERAL: PAINLEVEL: NO PAIN (1)

## 2024-07-15 NOTE — PROGRESS NOTES
Preoperative Evaluation  [facility]  [address]  [facility] [place] [address]  Phone: [phone]  Primary Provider: Adam Winters PA-C  Pre-op Performing Provider: Adam Winters PA-C  Jul 15, 2024             7/15/2024   Surgical Information   What procedure is being done? carotid artery surgery   Facility or Hospital where procedure/surgery will be performed: [facility]   Who is doing the procedure / surgery? dr potts   Date of surgery / procedure: 07222024   Time of surgery / procedure: 1030am   Where do you plan to recover after surgery? at home with family        Fax number for surgical facility: Note does not need to be faxed, will be available electronically in Epic.    Assessment & Plan     The proposed surgical procedure is considered HIGH risk.    Preop general physical exam  Carotid stenosis, right  Coronary artery disease involving autologous artery coronary bypass graft without angina pectoris  Type 2 diabetes mellitus without complication, without long-term current use of insulin (H)  LBBB (left bundle branch block)  Chronic kidney disease, stage 3a (H)     Cleared for surgical intervention at this point in time pending related labs.  - EKG 12-lead complete w/read - Clinics  - CBC with platelets and differential; Future  - Comprehensive metabolic panel (BMP + Alb, Alk Phos, ALT, AST, Total. Bili, TP); Future  - BNP-N terminal pro; Future    Other iron deficiency anemia  Elevated brain natriuretic peptide (BNP) level  Adjustment disorder with mixed anxiety and depressed mood  Acute pain of right knee  Fall at home, initial encounter  Forehead contusion, initial encounter - right  Congestive heart failure, unspecified HF chronicity, unspecified heart failure type (H)  Historically noted about 2 weeks ago.  He is healing well.  Did encourage him to make sure that he has a stability of the device with him at all times when he is ambulating.  - BNP-N terminal pro;  Future    Need for vaccination against respiratory syncytial virus  Recommended for this fall.  - respiratory syncytial virus vaccine, bivalent (ABRYSVO) injection; Inject 0.5 mLs into the muscle once for 1 dose  - BNP-N terminal pro; Future    Need for shingles vaccine  Recommended for this fall.  - zoster vaccine recombinant adjuvanted (SHINGRIX) injection; Inject 0.5 mLs into the muscle once for 1 dose Pharmacist administered  - BNP-N terminal pro; Future            - No identified additional risk factors other than previously addressed    Antiplatelet or Anticoagulation Medication Instructions   - Patient is on no antiplatelet or anticoagulation medications.   - clopidrogel (Plavix), prasugrel (Effient), ticagrelor (Brilinta): No contraindication to stopping Plavix, DO NOT TAKE 5-7 days before surgery.     Additional Medication Instructions   - Beta Blockers: Continue taking on the day of surgery.   - Diuretics: DO NOT TAKE on the day of surgery.   - metformin: DO NOT TAKE day of surgery.   - sulfonylurea (e.g. glyburide, glipizide): DO NOT TAKE day of surgery   - Thiazolidinedione (e.g. rosiglitazone, pioglitazone): DO NOT TAKE day of surgery.    Recommendation  Approval given to proceed with proposed procedure, without further diagnostic evaluation.    Christa Nevarez is a 88 year old, presenting for the following:  Pre-Op Exam          7/15/2024     9:39 AM   Additional Questions   Roomed by Katelynn   Accompanied by Makeda - wife     HPI related to upcoming procedure: right carotid stenosis in need of intervention        7/15/2024   Pre-Op Questionnaire   Have you ever had a heart attack or stroke? (!) YES    Have you ever had surgery on your heart or blood vessels, such as a stent placement, a coronary artery bypass, or surgery on an artery in your head, neck, heart, or legs? (!) YES    Do you have chest pain with activity? No   Do you have a history of heart failure? No   Do you currently have a cold,  bronchitis or symptoms of other infection? No   Do you have a cough, shortness of breath, or wheezing? No   Do you or anyone in your family have previous history of blood clots? No   Do you or does anyone in your family have a serious bleeding problem such as prolonged bleeding following surgeries or cuts? No   Have you ever had problems with anemia or been told to take iron pills? (!) YES    Have you had any abnormal blood loss such as black, tarry or bloody stools? No   Have you ever had a blood transfusion? No   Are you willing to have a blood transfusion if it is medically needed before, during, or after your surgery? Yes   Have you or any of your relatives ever had problems with anesthesia? No   Do you have sleep apnea, excessive snoring or daytime drowsiness? No   Do you have any artifical heart valves or other implanted medical devices like a pacemaker, defibrillator, or continuous glucose monitor? No   Do you have artificial joints? No   Are you allergic to latex? No        Health Care Directive  Patient does not have a Health Care Directive or Living Will: Patient states has Advance Directive and will bring in a copy to clinic.    Preoperative Review of    reviewed - no record of controlled substances prescribed.      Status of Chronic Conditions:  See problem list for active medical problems.  Problems all longstanding and stable, except as noted/documented.  See ROS for pertinent symptoms related to these conditions.    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) 04/08/2022     Priority: Medium    Gastroesophageal reflux disease with esophagitis, unspecified whether hemorrhage 01/14/2022     Priority: Medium    Cardiogenic shock (H) 12/06/2021     Priority: Medium    Coronary artery disease involving autologous artery coronary bypass graft without angina pectoris 12/06/2021      Priority: Medium     3 vessel      NSTEMI (non-ST elevated myocardial infarction) (H) 11/23/2021     Priority: Medium    Elevated brain natriuretic peptide (BNP) level 11/19/2021     Priority: Medium    LBBB (left bundle branch block) 11/19/2021     Priority: Medium    Chronic kidney disease, stage 3a (H) 11/15/2021     Priority: Medium    Personal history of COVID-19 - September 2021 11/15/2021     Priority: Medium    DELUCA (dyspnea on exertion) 11/15/2021     Priority: Medium    Hyperkalemia 04/08/2021     Priority: Medium    Carotid stenosis, right 12/18/2020     Priority: Medium    Other iron deficiency anemia 10/15/2019     Priority: Medium    Anxiety and depression 08/20/2018     Priority: Medium    Adjustment disorder with mixed anxiety and depressed mood 01/08/2018     Priority: Medium    Type 2 diabetes mellitus without complication, without long-term current use of insulin (H) 01/04/2018     Priority: Medium    Tick-borne disease 01/04/2018     Priority: Medium     Anaplasmosis      Gout 04/04/2012     Priority: Medium    Screening for prostate cancer 11/09/2011     Priority: Medium     Problem list name updated by automated process. Provider to review      Major depression in complete remission (H) 01/13/2011     Priority: Medium    HYPERLIPIDEMIA LDL GOAL <100 10/31/2010     Priority: Medium    Other left bundle branch block 12/03/2001     Priority: Medium      Past Medical History:   Diagnosis Date    Adjustment disorder with mixed anxiety and depressed mood 1/8/2018    Anxiety 8/20/2018    Calculus of kidney 2000    Cardiogenic shock (H) 12/6/2021    Coronary artery disease involving autologous artery coronary bypass graft without angina pectoris 12/6/2021    3 vessel    Depressive disorder, not elsewhere classified     History of Depression    Diabetic eye exam (H) 04/19/12    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) 1/13/2011    NONSPECIFIC MEDICAL HISTORY     Elevated CPK    NSTEMI (non-ST  elevated myocardial infarction) (H) 12/6/2021    Other and unspecified hyperlipidemia     Other left bundle branch block     Tick-borne disease 1/4/2018    Anaplasmosis     Past Surgical History:   Procedure Laterality Date    cabg Left     3 vessel    COLONOSCOPY  08/03/2011    Procedure:COMBINED COLONOSCOPY, SINGLE BIOPSY/POLYPECTOMY BY BIOPSY; Surgeon:GRUPO STONER; Location: GI    COLONOSCOPY  01/01/2016    [facility] FRAGMENTING OF KIDNEY STONE  03/05/2003    Left extracorporal shock wave lithotripsy, [facility]    PHACOEMULSIFICATION WITH STANDARD INTRAOCULAR LENS IMPLANT  01/20/2011    PHACOEMULSIFICATION WITH STANDARD INTRAOCULAR LENS IMPLANT performed by OSMAN CHO at  OR    [facility] COLONOSCOPY W/WO BRUSH/WASH  02/27/2001    Normal.     Current Outpatient Medications   Medication Sig Dispense Refill    allopurinol (ZYLOPRIM) 300 MG tablet Take 1 tablet by mouth daily. 90 tablet 3    ASPIRIN 81 MG OR TABS 1 tab po QD (Once per day) 0 0    atorvastatin (LIPITOR) 80 MG tablet TAKE ONE TABLET BY MOUTH EVERY DAY FOR CHOLESTEROL      blood glucose (ONETOUCH ULTRA) test strip USE TO TEST BLOOD SUGAR THREE TIMES A  each 3    blood glucose monitoring (NO BRAND SPECIFIED) meter device kit Use to test blood sugar 1 times daily or as directed. 1 kit 0    Blood Glucose Monitoring Suppl MISC 1 strip daily Or as directed. 100 each 0    famotidine (PEPCID) 20 MG tablet Take 1 tablet (20 mg) by mouth 2 times daily 180 tablet 0    furosemide (LASIX) 20 MG tablet Take 1 tablet (20 mg) by mouth 2 times daily 60 tablet 0    glipiZIDE (GLUCOTROL) 10 MG tablet TAKE ONE TABLET BY MOUTH TWICE A DAY FOR DIABETES *TAKE 30 MINUTES BEFORE A MEAL*      metFORMIN (GLUCOPHAGE) 1000 MG tablet Take 1,000 mg by mouth 2 times daily (with meals)  90 tablet 3    Microlet Lancets MISC USE TO TEST BLOOD SUGARS ONCE DAILY 100 each 3    nortriptyline (PAMELOR) 25 MG capsule Take 1-2 capsules (25-50 mg) by mouth At Bedtime 60  "capsule 3    PARoxetine (PAXIL) 30 MG tablet TAKE ONE TABLET BY MOUTH ONCE DAILY 90 tablet 3    pioglitazone (ACTOS) 30 MG tablet Take 1 tablet (30 mg) by mouth daily 90 tablet 1    simvastatin (ZOCOR) 80 MG tablet Take 40 mg by mouth daily 90 tablet 3    ACE/ARB/ARNI NOT PRESCRIBED, INTENTIONAL, Please choose reason not prescribed, below (Patient not taking: Reported on 5/17/2024)      acetaminophen (TYLENOL) 500 MG tablet Take 1,000 mg by mouth daily (Patient not taking: Reported on 6/20/2024)      clopidogrel (PLAVIX) 75 MG tablet TAKE ONE TABLET BY MOUTH ONCE DAILY (Patient not taking: Reported on 7/15/2024) 90 tablet 1    glucose (BD GLUCOSE) 4 g chewable tablet CHEW TWO TABLETS BY MOUTH DAILY FOR LOW BLOOD SUGAR FOR LOW BLOOD SUGARS (Patient not taking: Reported on 6/20/2024)      nitroGLYcerin (NITROSTAT) 0.4 MG sublingual tablet Place 1 tablet (0.4 mg) under the tongue as needed for chest pain (Patient not taking: Reported on 6/20/2024) 25 tablet 0       Allergies   Allergen Reactions    No Known Drug Allergy         Social History     Tobacco Use    Smoking status: Never    Smokeless tobacco: Never    Tobacco comments:     never   Substance Use Topics    Alcohol use: No     Alcohol/week: 0.0 standard drinks of alcohol       History   Drug Use No             Review of Systems  Constitutional, HEENT, cardiovascular, pulmonary, GI, , musculoskeletal, neuro, skin, endocrine and psych systems are negative, except as otherwise noted.    Objective    /68   Pulse 96   Temp 97.2  F (36.2  C) (Temporal)   Resp 18   Ht 1.816 m (5' 11.5\")   Wt 85.7 kg (189 lb)   SpO2 98%   BMI 25.99 kg/m     Estimated body mass index is 25.99 kg/m  as calculated from the following:    Height as of this encounter: 1.816 m (5' 11.5\").    Weight as of this encounter: 85.7 kg (189 lb).  Physical Exam  GENERAL: alert and no distress  EYES: Eyes grossly normal to inspection, PERRL and conjunctivae and sclerae normal  HENT: ear " canals and TM's normal, nose and mouth without ulcers or lesions  NECK: no adenopathy, no asymmetry, masses, or scars  RESP: lungs clear to auscultation - no rales, rhonchi or wheezes  CV: regular rate and rhythm, normal S1 S2, no S3 or S4, no murmur, click or rub, no peripheral edema  ABDOMEN: soft, nontender, no hepatosplenomegaly, no masses and bowel sounds normal  MS: no gross musculoskeletal defects noted, no edema  SKIN: no suspicious lesions or rashes  NEURO: Normal strength and tone, mentation intact and speech normal  PSYCH: mentation appears normal, affect normal/bright    Recent Labs   Lab Test 06/06/24  1044 11/22/23  1039   HGB 12.1* 13.2*   PLT  --  219     --    POTASSIUM 5.3  --    CR 1.30*  --    A1C 7.7* 7.3*        Diagnostics  Labs pending at this time.  Results will be reviewed when available.   EKG required for known coronary heart disease and not completed in the last 90 days.  Left bundle branch block consistent with previous diagnosis and almost identical to previous EKG from 4 years ago.  Recent echocardiogram shows stability of cardiac function at this point in time.  Ejection fraction is to be considered stable for him at this point in time.    Revised Cardiac Risk Index (RCRI)  The patient has the following serious cardiovascular risks for perioperative complications:   - No serious cardiac risks = 0 points     RCRI Interpretation: 1 point: Class II (low risk - 0.9% complication rate)         Signed Electronically by: Adam Winters PA-C  Copy of this evaluation report is provided to requesting physician.

## 2024-07-15 NOTE — LETTER
July 15, 2024      Roque Mckeon  [address]  [place] [address]            Dear ,    We are writing to inform you of your test results.    Stable labs.   Recheck in 3 months.     Resulted Orders   Comprehensive metabolic panel (BMP + Alb, Alk Phos, ALT, AST, Total. Bili, TP)   Result Value Ref Range    Sodium 137 135 - 145 mmol/L    Potassium 5.2 3.4 - 5.3 mmol/L    Carbon Dioxide (CO2) 25 22 - 29 mmol/L    Anion Gap 10 7 - 15 mmol/L    Urea Nitrogen 27.1 (H) 8.0 - 23.0 mg/dL    Creatinine 1.20 (H) 0.67 - 1.17 mg/dL    GFR Estimate 59 (L) >60 mL/min/1.73m2      Comment:      eGFR calculated using 2021 CKD-EPI equation.    Calcium 9.3 8.8 - 10.2 mg/dL    Chloride 102 98 - 107 mmol/L    Glucose 174 (H) 70 - 99 mg/dL    Alkaline Phosphatase 87 40 - 150 U/L    AST 21 0 - 45 U/L      Comment:      Reference intervals for this test were updated on 6/12/2023 to more accurately reflect our healthy population. There may be differences in the flagging of prior results with similar values performed with this method. Interpretation of those prior results can be made in the context of the updated reference intervals.    ALT 19 0 - 70 U/L      Comment:      Reference intervals for this test were updated on 6/12/2023 to more accurately reflect our healthy population. There may be differences in the flagging of prior results with similar values performed with this method. Interpretation of those prior results can be made in the context of the updated reference intervals.      Protein Total 7.4 6.4 - 8.3 g/dL    Albumin 4.2 3.5 - 5.2 g/dL    Bilirubin Total 0.5 <=1.2 mg/dL   BNP-N terminal pro   Result Value Ref Range    N Terminal Pro BNP Outpatient 1,115 0 - 1,800 pg/mL      Comment:      Reference range shown and results flagged as abnormal are for the outpatient, non acute settings. Establishing a baseline value for each individual patient is useful for follow-up.    Suggested inpatient cut points for confirming  diagnosis of CHF in an acute setting are:  >450 pg/mL (age 18 to less than 50)  >900 pg/mL (age 50 to less than 75)  >1800 pg/mL (75 yrs and older)    An inpatient or emergency department NT-proPBNP <300 pg/mL effectively rules out acute CHF, with 99% negative predictive value.       CBC with platelets and differential   Result Value Ref Range    WBC Count 5.5 4.0 - 11.0 10e3/uL    RBC Count 3.63 (L) 4.40 - 5.90 10e6/uL    Hemoglobin 12.2 (L) 13.3 - 17.7 g/dL    Hematocrit 37.6 (L) 40.0 - 53.0 %     (H) 78 - 100 fL    MCH 33.6 (H) 26.5 - 33.0 pg    MCHC 32.4 31.5 - 36.5 g/dL    RDW 15.5 (H) 10.0 - 15.0 %    Platelet Count 229 150 - 450 10e3/uL    % Neutrophils 72 %    % Lymphocytes 17 %    % Monocytes 10 %    % Eosinophils 1 %    % Basophils 0 %    % Immature Granulocytes 0 %    Absolute Neutrophils 4.0 1.6 - 8.3 10e3/uL    Absolute Lymphocytes 0.9 0.8 - 5.3 10e3/uL    Absolute Monocytes 0.5 0.0 - 1.3 10e3/uL    Absolute Eosinophils 0.1 0.0 - 0.7 10e3/uL    Absolute Basophils 0.0 0.0 - 0.2 10e3/uL    Absolute Immature Granulocytes 0.0 <=0.4 10e3/uL       If you have any questions or concerns, please call the clinic at the number listed above.       Sincerely,      Adam Winters PA-C

## 2024-07-22 ENCOUNTER — PATIENT OUTREACH (OUTPATIENT)
Dept: CARE COORDINATION | Facility: CLINIC | Age: 89
End: 2024-07-22
Payer: COMMERCIAL

## 2024-07-22 NOTE — PROGRESS NOTES
Clinical Product Navigator reviewed chart; patient on payer product coverage.  Review results:   CPN Initial Information Gathering  Referral Source: Health Plan  Referrals Places: Care Coordination    Patient identified by health plan for care management support with high probability risk of admission due to admission history, chronic medical conditions and provider/specialty utilization. Per chart review, patient would benefit from care management support for chronic disease management.     See problem list and EMR as patient may be eligible for [employer] SSBCI benefit.     Patient Active Problem List   Diagnosis    Other left bundle branch block    HYPERLIPIDEMIA LDL GOAL <100    Major depression in complete remission (H)    Screening for prostate cancer    Gout    Type 2 diabetes mellitus without complication, without long-term current use of insulin (H)    Tick-borne disease    Adjustment disorder with mixed anxiety and depressed mood    Anxiety and depression    Other iron deficiency anemia    Carotid stenosis, right    Hyperkalemia    Chronic kidney disease, stage 3a (H)    Personal history of COVID-19 - September 2021    DELUCA (dyspnea on exertion)    Elevated brain natriuretic peptide (BNP) level    LBBB (left bundle branch block)    Cardiogenic shock (H)    Coronary artery disease involving autologous artery coronary bypass graft without angina pectoris    Gastroesophageal reflux disease with esophagitis, unspecified whether hemorrhage    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    LILY Shukla  Social Work Care Coordinator   Clinical Product Navigation

## 2024-07-24 ENCOUNTER — PATIENT OUTREACH (OUTPATIENT)
Dept: CARE COORDINATION | Facility: CLINIC | Age: 89
End: 2024-07-24
Payer: COMMERCIAL

## 2024-07-24 NOTE — PROGRESS NOTES
Clinic Care Coordination Contact  Transitions of Care Outreach  Chief Complaint   Patient presents with    Clinic Care Coordination - Post Hospital     RN CC- post hospital follow up       Most Recent Admission Date: 7/22/2024  Most Recent Admission Diagnosis: RIGHT CAROTID STENOSIS     Most Recent Discharge Date: 7/23/2024  Most Recent Discharge Diagnosis: DC TEAEC W/PATCH GRF CAROTID VERTB SUBCLAV NECK INC     Transitions of Care Assessment    Discharge Assessment  How are you doing now that you are home?: per patient report, he is doing really well. he said he feels well. has all medications. follow up scheduled 7/29/2024 with PCP  How are your symptoms? (Red Flag symptoms escalate to triage hotline per guidelines): Improved  Do you know how to contact your clinic care team if you have future questions or changes to your health status? : Yes  Does the patient have their discharge instructions? : Yes  Does the patient have questions regarding their discharge instructions? : No  Were you started on any new medications or were there changes to any of your previous medications? : Yes  Does the patient have all of their medications?: Yes  Do you have questions regarding any of your medications? : No  Do you have all of your needed medical supplies or equipment (DME)?  (i.e. oxygen tank, CPAP, cane, etc.): Yes         Post-op (Clinicians Only)  Fever: No  Chills: No  Eating & Drinking: eating and drinking without complaints/concerns    Follow up Plan     Discharge Follow-Up  Discharge follow up appointment scheduled in alignment with recommended follow up timeframe or Transitions of Risk Category? (Low = within 30 days; Moderate= within 14 days; High= within 7 days): Yes  Discharge Follow Up Appointment Date: 07/29/24  Discharge Follow Up Appointment Scheduled with?: Primary Care Provider    Future Appointments   Date Time Provider Department Center   7/29/2024 10:30 AM Adam Winters PA-C [facility]        Outpatient Plan as outlined on AVS reviewed with patient.    For any urgent concerns, please contact our 24 hour nurse triage line: [phone] ([phone])       Patient declines care coordination at this time.    Sofy Clay RN

## 2024-07-24 NOTE — PROGRESS NOTES
Clinic Care Coordination Contact  [facility]/Voicemail    Clinical Data: Care Coordinator Outreach    Outreach Documentation Number of Outreach Attempt   7/24/2024   1:02 PM 1       Left message on patient's voicemail with call back information and requested return call.    Plan: Care Coordinator will try to reach patient again in 1-2 business days.    Sofy Clay RN Care Coordination   [facility]  Email: [email or web address]  Phone: [phone]

## 2024-07-29 ENCOUNTER — OFFICE VISIT (OUTPATIENT)
Dept: FAMILY MEDICINE | Facility: OTHER | Age: 89
End: 2024-07-29
Payer: COMMERCIAL

## 2024-07-29 VITALS
TEMPERATURE: 97.2 F | RESPIRATION RATE: 18 BRPM | OXYGEN SATURATION: 95 % | DIASTOLIC BLOOD PRESSURE: 60 MMHG | SYSTOLIC BLOOD PRESSURE: 110 MMHG | HEIGHT: 72 IN | BODY MASS INDEX: 24.92 KG/M2 | WEIGHT: 184 LBS | HEART RATE: 97 BPM

## 2024-07-29 DIAGNOSIS — I44.7 LBBB (LEFT BUNDLE BRANCH BLOCK): ICD-10-CM

## 2024-07-29 DIAGNOSIS — Z23 NEED FOR SHINGLES VACCINE: ICD-10-CM

## 2024-07-29 DIAGNOSIS — E11.9 TYPE 2 DIABETES MELLITUS WITHOUT COMPLICATION, WITHOUT LONG-TERM CURRENT USE OF INSULIN (H): ICD-10-CM

## 2024-07-29 DIAGNOSIS — I25.810 CORONARY ARTERY DISEASE INVOLVING AUTOLOGOUS ARTERY CORONARY BYPASS GRAFT WITHOUT ANGINA PECTORIS: ICD-10-CM

## 2024-07-29 DIAGNOSIS — Z98.890 S/P CAROTID ENDARTERECTOMY: Primary | ICD-10-CM

## 2024-07-29 DIAGNOSIS — Z29.11 NEED FOR VACCINATION AGAINST RESPIRATORY SYNCYTIAL VIRUS: ICD-10-CM

## 2024-07-29 DIAGNOSIS — Z09 HOSPITAL DISCHARGE FOLLOW-UP: ICD-10-CM

## 2024-07-29 DIAGNOSIS — E87.5 HYPERKALEMIA: ICD-10-CM

## 2024-07-29 LAB
ALBUMIN SERPL BCG-MCNC: 3.9 G/DL (ref 3.5–5.2)
ALP SERPL-CCNC: 132 U/L (ref 40–150)
ALT SERPL W P-5'-P-CCNC: 21 U/L (ref 0–70)
ANION GAP SERPL CALCULATED.3IONS-SCNC: 8 MMOL/L (ref 7–15)
AST SERPL W P-5'-P-CCNC: 25 U/L (ref 0–45)
BASOPHILS # BLD AUTO: 0 10E3/UL (ref 0–0.2)
BASOPHILS NFR BLD AUTO: 0 %
BILIRUB SERPL-MCNC: 0.3 MG/DL
BUN SERPL-MCNC: 31.5 MG/DL (ref 8–23)
CALCIUM SERPL-MCNC: 9.2 MG/DL (ref 8.8–10.4)
CHLORIDE SERPL-SCNC: 98 MMOL/L (ref 98–107)
CREAT SERPL-MCNC: 1.22 MG/DL (ref 0.67–1.17)
EGFRCR SERPLBLD CKD-EPI 2021: 58 ML/MIN/1.73M2
EOSINOPHIL # BLD AUTO: 0.1 10E3/UL (ref 0–0.7)
EOSINOPHIL NFR BLD AUTO: 2 %
ERYTHROCYTE [DISTWIDTH] IN BLOOD BY AUTOMATED COUNT: 14 % (ref 10–15)
GLUCOSE SERPL-MCNC: 210 MG/DL (ref 70–99)
HCO3 SERPL-SCNC: 28 MMOL/L (ref 22–29)
HCT VFR BLD AUTO: 36.7 % (ref 40–53)
HGB BLD-MCNC: 11.8 G/DL (ref 13.3–17.7)
IMM GRANULOCYTES # BLD: 0 10E3/UL
IMM GRANULOCYTES NFR BLD: 0 %
INR PPP: 1.11 (ref 0.85–1.15)
LYMPHOCYTES # BLD AUTO: 1 10E3/UL (ref 0.8–5.3)
LYMPHOCYTES NFR BLD AUTO: 18 %
MCH RBC QN AUTO: 33 PG (ref 26.5–33)
MCHC RBC AUTO-ENTMCNC: 32.2 G/DL (ref 31.5–36.5)
MCV RBC AUTO: 103 FL (ref 78–100)
MONOCYTES # BLD AUTO: 0.5 10E3/UL (ref 0–1.3)
MONOCYTES NFR BLD AUTO: 9 %
NEUTROPHILS # BLD AUTO: 4 10E3/UL (ref 1.6–8.3)
NEUTROPHILS NFR BLD AUTO: 70 %
NRBC # BLD AUTO: 0 10E3/UL
NRBC BLD AUTO-RTO: 0 /100
PLATELET # BLD AUTO: 280 10E3/UL (ref 150–450)
POTASSIUM SERPL-SCNC: 5.5 MMOL/L (ref 3.4–5.3)
PROT SERPL-MCNC: 7.4 G/DL (ref 6.4–8.3)
RBC # BLD AUTO: 3.58 10E6/UL (ref 4.4–5.9)
SODIUM SERPL-SCNC: 134 MMOL/L (ref 135–145)
WBC # BLD AUTO: 5.7 10E3/UL (ref 4–11)

## 2024-07-29 PROCEDURE — 80053 COMPREHEN METABOLIC PANEL: CPT | Performed by: PHYSICIAN ASSISTANT

## 2024-07-29 PROCEDURE — 85610 PROTHROMBIN TIME: CPT | Performed by: PHYSICIAN ASSISTANT

## 2024-07-29 PROCEDURE — 85025 COMPLETE CBC W/AUTO DIFF WBC: CPT | Performed by: PHYSICIAN ASSISTANT

## 2024-07-29 PROCEDURE — 99495 TRANSJ CARE MGMT MOD F2F 14D: CPT | Performed by: PHYSICIAN ASSISTANT

## 2024-07-29 PROCEDURE — 36415 COLL VENOUS BLD VENIPUNCTURE: CPT | Performed by: PHYSICIAN ASSISTANT

## 2024-07-29 ASSESSMENT — PAIN SCALES - GENERAL: PAINLEVEL: NO PAIN (0)

## 2024-07-29 NOTE — PROGRESS NOTES
"  Assessment & Plan   The longitudinal plan of care for the diagnosis(es)/condition(s) as documented were addressed during this visit. Due to the added complexity in care, I will continue to support Roque in the subsequent management and with ongoing continuity of care.    Hospital discharge follow-up  S/P carotid endarterectomy  Need for shingles vaccine  Need for vaccination against respiratory syncytial virus  Hyperkalemia  Type 2 diabetes mellitus without complication, without long-term current use of insulin (H)  Coronary artery disease involving autologous artery coronary bypass graft without angina pectoris  LBBB (left bundle branch block)  Recommend that he contact his specialist to see if he should be back on his Plavix given his cardiovascular history.  I advised him to begin a regular aspirin every single day for now until we hear back.  Follow-up with cardiologist is also recommended.  - CBC with platelets and differential; Future  - INR; Future  - Comprehensive metabolic panel (BMP + Alb, Alk Phos, ALT, AST, Total. Bili, TP); Future  - CBC with platelets and differential  - INR  - Comprehensive metabolic panel (BMP + Alb, Alk Phos, ALT, AST, Total. Bili, TP)        MED REC REQUIRED  Post Medication Reconciliation Status:     BMI  Estimated body mass index is 25.31 kg/m  as calculated from the following:    Height as of this encounter: 1.816 m (5' 11.5\").    Weight as of this encounter: 83.5 kg (184 lb).         Regular exercise    Subjective   Roque is a 88 year old, presenting for the following health issues:  Hospital F/U        7/29/2024    10:10 AM   Additional Questions   Roomed by barney SORIANO        Hospital Follow-up Visit:    Hospital/Nursing Home/IP Rehab Facility:  [facility]  Date of Admission: 7/22/24  Date of Discharge: 7/23/24  Reason(s) for Admission: right carotid endarterectomy  Was the patient in the ICU or did the patient experience delirium during hospitalization?  No  Do you have " "any other stressors you would like to discuss with your provider? No    Problems taking medications regularly:  None  Medication changes since discharge: None  Problems adhering to non-medication therapy:  None    Summary of hospitalization:  CareEverywhere information obtained and reviewed  Diagnostic Tests/Treatments reviewed.  Follow up needed: vascular specialist and cardiology  Other Healthcare Providers Involved in Patient s Care:         Specialist appointment - future  Update since discharge: improved.         Plan of care communicated with patient             Review of Systems  Constitutional, HEENT, cardiovascular, pulmonary, GI, , musculoskeletal, neuro, skin, endocrine and psych systems are negative, except as otherwise noted.      Objective    /60 (BP Location: Right arm, Patient Position: Sitting, Cuff Size: Adult Regular)   Pulse 97   Temp 97.2  F (36.2  C) (Temporal)   Resp 18   Ht 1.816 m (5' 11.5\")   Wt 83.5 kg (184 lb)   SpO2 95%   BMI 25.31 kg/m    Body mass index is 25.31 kg/m .  Physical Exam   GENERAL: alert and no distress  NECK: no adenopathy, no asymmetry, masses, or scars  RESP: lungs clear to auscultation - no rales, rhonchi or wheezes  CV: regular rate and rhythm, normal S1 S2, no S3 or S4, no murmur, click or rub, no peripheral edema  ABDOMEN: soft, nontender, no hepatosplenomegaly, no masses and bowel sounds normal  MS: no gross musculoskeletal defects noted, no edema    Results for orders placed or performed in visit on 07/29/24 (from the past 24 hour(s))   CBC with platelets and differential    Narrative    The following orders were created for panel order CBC with platelets and differential.  Procedure                               Abnormality         Status                     ---------                               -----------         ------                     CBC with platelets and d...[[id number]]                      In process                   Please view " results for these tests on the individual orders.           Signed Electronically by: Adam Winters PA-C

## 2024-08-26 ENCOUNTER — TRANSFERRED RECORDS (OUTPATIENT)
Dept: HEALTH INFORMATION MANAGEMENT | Facility: CLINIC | Age: 89
End: 2024-08-26
Payer: COMMERCIAL

## 2024-11-18 ENCOUNTER — TRANSFERRED RECORDS (OUTPATIENT)
Dept: HEALTH INFORMATION MANAGEMENT | Facility: CLINIC | Age: 89
End: 2024-11-18
Payer: COMMERCIAL

## 2025-04-28 ENCOUNTER — PATIENT OUTREACH (OUTPATIENT)
Dept: FAMILY MEDICINE | Facility: OTHER | Age: 89
End: 2025-04-28
Payer: COMMERCIAL

## 2025-04-28 NOTE — TELEPHONE ENCOUNTER
Patient Quality Outreach    Patient is due for the following:   Physical Annual Wellness Visit    Action(s) Taken:   Schedule a Annual Wellness Visit    Type of outreach:    Sent letter.    Questions for provider review:    None         [facility]  Chart routed to None.

## 2025-04-28 NOTE — LETTER
April 28, 2025      Roque Mckeon  [address]  [place] [address]                  Hi Roque,     Our records indicate that you are due for a Annual Wellness Check with your Primary Care Provider.     Our records also indicate that you are due for some immunizations, labs and/or preventative screenings.     Please schedule an appointment via [employer] or by calling the clinic at [phone], and asking to speak to a member of your care team.        To Schedule an Appointment via [employer] -     1) Click the Visits tab (located on the top left) and select the option to Schedule an Appointment  2) Choose the department you would like to schedule in and respond to any additional questions that populate.  (Note: There are also options for you to indicate preferred clinic locations and providers)  3) Verify your Personal Information by clicking This Information Is Correct or Edit if the information is not correct  4) Review the insurance information on file and if current, select This Information Is Correct   If the information is not correct, you can select options to remove or update your coverage.  5) Where prompted, please specify the reason for your appointment and select Schedule    After the appointment has been scheduled, you will see a confirmation with all the details for your upcoming visit.      Take Care,  [facility] [employer] Patient Care Team    [facility]  Phone: [phone]  Fax: [phone]                Sincerely,        Adam Winters PA-C    Electronically signed

## 2025-05-05 DIAGNOSIS — F41.9 ANXIETY AND DEPRESSION: ICD-10-CM

## 2025-05-05 DIAGNOSIS — F32.A ANXIETY AND DEPRESSION: ICD-10-CM

## 2025-05-05 RX ORDER — PAROXETINE 30 MG/1
30 TABLET, FILM COATED ORAL DAILY
Qty: 90 TABLET | Refills: 3 | Status: SHIPPED | OUTPATIENT
Start: 2025-05-05

## (undated) RX ORDER — PROPOFOL 10 MG/ML
INJECTION, EMULSION INTRAVENOUS
Status: DISPENSED
Start: 2021-03-05

## (undated) RX ORDER — LIDOCAINE HYDROCHLORIDE 20 MG/ML
INJECTION, SOLUTION EPIDURAL; INFILTRATION; INTRACAUDAL; PERINEURAL
Status: DISPENSED
Start: 2021-03-05